# Patient Record
Sex: FEMALE | Race: WHITE | NOT HISPANIC OR LATINO | Employment: OTHER | URBAN - METROPOLITAN AREA
[De-identification: names, ages, dates, MRNs, and addresses within clinical notes are randomized per-mention and may not be internally consistent; named-entity substitution may affect disease eponyms.]

---

## 2023-02-21 LAB
ALBUMIN (G/DL) IN SER/PLAS: 4.7 G/DL (ref 3.4–5)
ANION GAP IN SER/PLAS: 13 MMOL/L (ref 10–20)
CALCIUM (MG/DL) IN SER/PLAS: 10.9 MG/DL (ref 8.6–10.6)
CARBON DIOXIDE, TOTAL (MMOL/L) IN SER/PLAS: 26 MMOL/L (ref 21–32)
CHLORIDE (MMOL/L) IN SER/PLAS: 107 MMOL/L (ref 98–107)
CREATININE (MG/DL) IN SER/PLAS: 0.98 MG/DL (ref 0.5–1.05)
ERYTHROCYTE DISTRIBUTION WIDTH (RATIO) BY AUTOMATED COUNT: 12.8 % (ref 11.5–14.5)
ERYTHROCYTE MEAN CORPUSCULAR HEMOGLOBIN CONCENTRATION (G/DL) BY AUTOMATED: 31.8 G/DL (ref 32–36)
ERYTHROCYTE MEAN CORPUSCULAR VOLUME (FL) BY AUTOMATED COUNT: 97 FL (ref 80–100)
ERYTHROCYTES (10*6/UL) IN BLOOD BY AUTOMATED COUNT: 4.07 X10E12/L (ref 4–5.2)
GFR FEMALE: 68 ML/MIN/1.73M2
GLUCOSE (MG/DL) IN SER/PLAS: 130 MG/DL (ref 74–99)
HEMATOCRIT (%) IN BLOOD BY AUTOMATED COUNT: 39.6 % (ref 36–46)
HEMOGLOBIN (G/DL) IN BLOOD: 12.6 G/DL (ref 12–16)
LEUKOCYTES (10*3/UL) IN BLOOD BY AUTOMATED COUNT: 6.2 X10E9/L (ref 4.4–11.3)
NRBC (PER 100 WBCS) BY AUTOMATED COUNT: 0 /100 WBC (ref 0–0)
PHOSPHATE (MG/DL) IN SER/PLAS: 3 MG/DL (ref 2.5–4.9)
PLATELETS (10*3/UL) IN BLOOD AUTOMATED COUNT: 344 X10E9/L (ref 150–450)
POTASSIUM (MMOL/L) IN SER/PLAS: 4.5 MMOL/L (ref 3.5–5.3)
SODIUM (MMOL/L) IN SER/PLAS: 141 MMOL/L (ref 136–145)
TACROLIMUS (NG/ML) IN BLOOD: 5.6 NG/ML (ref 2–15)
UREA NITROGEN (MG/DL) IN SER/PLAS: 8 MG/DL (ref 6–23)

## 2023-04-28 DIAGNOSIS — Z94.9 ORGAN TRANSPLANT: ICD-10-CM

## 2023-04-28 DIAGNOSIS — I10 ESSENTIAL HYPERTENSION, BENIGN: ICD-10-CM

## 2023-04-28 DIAGNOSIS — Z12.31 VISIT FOR SCREENING MAMMOGRAM: ICD-10-CM

## 2023-04-28 DIAGNOSIS — Z00.00 MEDICARE WELCOME VISIT: ICD-10-CM

## 2023-05-02 PROBLEM — R39.9 UTI SYMPTOMS: Status: ACTIVE | Noted: 2023-05-02

## 2023-05-02 PROBLEM — R30.0 DYSURIA: Status: ACTIVE | Noted: 2023-05-02

## 2023-05-02 PROBLEM — B34.8 BK VIREMIA: Status: ACTIVE | Noted: 2023-05-02

## 2023-05-02 PROBLEM — M25.511 SHOULDER PAIN, RIGHT: Status: ACTIVE | Noted: 2023-05-02

## 2023-05-02 PROBLEM — R31.9 HEMATURIA: Status: ACTIVE | Noted: 2023-05-02

## 2023-05-02 PROBLEM — D84.9 IMMUNOSUPPRESSION (MULTI): Status: ACTIVE | Noted: 2023-05-02

## 2023-05-02 PROBLEM — J32.9 SINOBRONCHITIS: Status: ACTIVE | Noted: 2023-05-02

## 2023-05-02 PROBLEM — S60.212A CONTUSION OF WRIST, LEFT: Status: ACTIVE | Noted: 2023-05-02

## 2023-05-02 PROBLEM — M75.101 ROTATOR CUFF TEAR, RIGHT: Status: ACTIVE | Noted: 2023-05-02

## 2023-05-02 PROBLEM — F41.8 DEPRESSION WITH ANXIETY: Status: ACTIVE | Noted: 2023-05-02

## 2023-05-02 PROBLEM — S20.212A CONTUSION OF CHEST WALL, LEFT, INITIAL ENCOUNTER: Status: ACTIVE | Noted: 2023-05-02

## 2023-05-02 PROBLEM — J32.9 SINUSITIS: Status: ACTIVE | Noted: 2023-05-02

## 2023-05-02 PROBLEM — A63.0 GENITAL WARTS: Status: ACTIVE | Noted: 2023-05-02

## 2023-05-02 PROBLEM — J20.9 ACUTE BRONCHITIS: Status: ACTIVE | Noted: 2023-05-02

## 2023-05-02 PROBLEM — E83.52 HYPERCALCEMIA: Status: ACTIVE | Noted: 2023-05-02

## 2023-05-02 PROBLEM — Z94.0 KIDNEY REPLACED BY TRANSPLANT (HHS-HCC): Status: ACTIVE | Noted: 2023-05-02

## 2023-05-02 PROBLEM — J40 SINOBRONCHITIS: Status: ACTIVE | Noted: 2023-05-02

## 2023-05-02 PROBLEM — R11.0 NAUSEA IN ADULT: Status: ACTIVE | Noted: 2023-05-02

## 2023-05-02 PROBLEM — Z94.9 TRANSPLANT: Status: ACTIVE | Noted: 2023-05-02

## 2023-05-02 PROBLEM — I10 ESSENTIAL HYPERTENSION: Status: ACTIVE | Noted: 2023-05-02

## 2023-05-02 PROBLEM — F33.8 OTHER RECURRENT DEPRESSIVE DISORDERS (CMS-HCC): Status: ACTIVE | Noted: 2023-05-02

## 2023-05-02 PROBLEM — K21.9 GERD (GASTROESOPHAGEAL REFLUX DISEASE): Status: ACTIVE | Noted: 2023-05-02

## 2023-05-02 PROBLEM — R10.30 LOWER ABDOMINAL PAIN: Status: ACTIVE | Noted: 2023-05-02

## 2023-05-02 PROBLEM — M75.80 ROTATOR CUFF TENDINITIS: Status: ACTIVE | Noted: 2023-05-02

## 2023-05-02 PROBLEM — R35.0 INCREASED FREQUENCY OF URINATION: Status: ACTIVE | Noted: 2023-05-02

## 2023-05-02 PROBLEM — N30.00 ACUTE CYSTITIS WITHOUT HEMATURIA: Status: ACTIVE | Noted: 2023-05-02

## 2023-05-02 PROBLEM — S46.011A TRAUMATIC TEAR OF RIGHT ROTATOR CUFF: Status: ACTIVE | Noted: 2023-05-02

## 2023-05-02 PROBLEM — N39.0 ACUTE LOWER UTI: Status: ACTIVE | Noted: 2023-05-02

## 2023-05-02 PROBLEM — R79.89 ELEVATED SERUM CREATININE: Status: ACTIVE | Noted: 2023-05-02

## 2023-05-02 PROBLEM — R73.9 HYPERGLYCEMIA: Status: ACTIVE | Noted: 2023-05-02

## 2023-05-02 PROBLEM — B02.9 HERPES ZOSTER INFECTION: Status: ACTIVE | Noted: 2023-05-02

## 2023-05-02 PROBLEM — S50.02XA CONTUSION OF ELBOW, LEFT: Status: ACTIVE | Noted: 2023-05-02

## 2023-05-02 PROBLEM — R87.619 ABNORMAL PAP SMEAR OF CERVIX: Status: ACTIVE | Noted: 2023-05-02

## 2023-05-02 PROBLEM — D64.9 ANEMIA: Status: ACTIVE | Noted: 2023-05-02

## 2023-05-02 PROBLEM — N18.2 CKD (CHRONIC KIDNEY DISEASE), STAGE II: Status: ACTIVE | Noted: 2023-05-02

## 2023-05-02 PROBLEM — B02.29 POST HERPETIC NEURALGIA: Status: ACTIVE | Noted: 2023-05-02

## 2023-05-02 PROBLEM — R51.9 HEADACHE: Status: ACTIVE | Noted: 2023-05-02

## 2023-05-02 PROBLEM — G47.00 INSOMNIA: Status: ACTIVE | Noted: 2023-05-02

## 2023-05-02 PROBLEM — M67.911 TENDINOPATHY OF RIGHT ROTATOR CUFF: Status: ACTIVE | Noted: 2023-05-02

## 2023-05-02 PROBLEM — S59.909A ELBOW INJURY: Status: ACTIVE | Noted: 2023-05-02

## 2023-05-02 PROBLEM — M89.8X1 SHOULDER BLADE PAIN: Status: ACTIVE | Noted: 2023-05-02

## 2023-05-02 PROBLEM — F41.1 GENERALIZED ANXIETY DISORDER: Status: ACTIVE | Noted: 2023-05-02

## 2023-05-02 PROBLEM — R21 RASH OF FACE: Status: ACTIVE | Noted: 2023-05-02

## 2023-05-05 ENCOUNTER — LAB (OUTPATIENT)
Dept: LAB | Facility: LAB | Age: 58
End: 2023-05-05
Payer: MEDICARE

## 2023-05-05 DIAGNOSIS — Z00.00 MEDICARE WELCOME VISIT: ICD-10-CM

## 2023-05-05 DIAGNOSIS — I10 ESSENTIAL HYPERTENSION, BENIGN: ICD-10-CM

## 2023-05-05 DIAGNOSIS — Z94.9 ORGAN TRANSPLANT: ICD-10-CM

## 2023-05-05 LAB
ALANINE AMINOTRANSFERASE (SGPT) (U/L) IN SER/PLAS: 8 U/L (ref 7–45)
ALBUMIN (G/DL) IN SER/PLAS: 4.4 G/DL (ref 3.4–5)
ALKALINE PHOSPHATASE (U/L) IN SER/PLAS: 87 U/L (ref 33–110)
ANION GAP IN SER/PLAS: 12 MMOL/L (ref 10–20)
ASPARTATE AMINOTRANSFERASE (SGOT) (U/L) IN SER/PLAS: 16 U/L (ref 9–39)
BASOPHILS (10*3/UL) IN BLOOD BY AUTOMATED COUNT: 0.08 X10E9/L (ref 0–0.1)
BASOPHILS/100 LEUKOCYTES IN BLOOD BY AUTOMATED COUNT: 0.9 % (ref 0–2)
BILIRUBIN TOTAL (MG/DL) IN SER/PLAS: 0.4 MG/DL (ref 0–1.2)
CALCIUM (MG/DL) IN SER/PLAS: 10.7 MG/DL (ref 8.6–10.6)
CARBON DIOXIDE, TOTAL (MMOL/L) IN SER/PLAS: 26 MMOL/L (ref 21–32)
CHLORIDE (MMOL/L) IN SER/PLAS: 105 MMOL/L (ref 98–107)
CHOLESTEROL (MG/DL) IN SER/PLAS: 196 MG/DL (ref 0–199)
CHOLESTEROL IN HDL (MG/DL) IN SER/PLAS: 104.7 MG/DL
CHOLESTEROL/HDL RATIO: 1.9
CREATININE (MG/DL) IN SER/PLAS: 1.1 MG/DL (ref 0.5–1.05)
EOSINOPHILS (10*3/UL) IN BLOOD BY AUTOMATED COUNT: 0.36 X10E9/L (ref 0–0.7)
EOSINOPHILS/100 LEUKOCYTES IN BLOOD BY AUTOMATED COUNT: 4 % (ref 0–6)
ERYTHROCYTE DISTRIBUTION WIDTH (RATIO) BY AUTOMATED COUNT: 12.9 % (ref 11.5–14.5)
ERYTHROCYTE MEAN CORPUSCULAR HEMOGLOBIN CONCENTRATION (G/DL) BY AUTOMATED: 31.6 G/DL (ref 32–36)
ERYTHROCYTE MEAN CORPUSCULAR VOLUME (FL) BY AUTOMATED COUNT: 96 FL (ref 80–100)
ERYTHROCYTES (10*6/UL) IN BLOOD BY AUTOMATED COUNT: 4.12 X10E12/L (ref 4–5.2)
GFR FEMALE: 59 ML/MIN/1.73M2
GLUCOSE (MG/DL) IN SER/PLAS: 109 MG/DL (ref 74–99)
HEMATOCRIT (%) IN BLOOD BY AUTOMATED COUNT: 39.6 % (ref 36–46)
HEMOGLOBIN (G/DL) IN BLOOD: 12.5 G/DL (ref 12–16)
IMMATURE GRANULOCYTES/100 LEUKOCYTES IN BLOOD BY AUTOMATED COUNT: 0.2 % (ref 0–0.9)
LDL: 79 MG/DL (ref 0–99)
LEUKOCYTES (10*3/UL) IN BLOOD BY AUTOMATED COUNT: 9 X10E9/L (ref 4.4–11.3)
LYMPHOCYTES (10*3/UL) IN BLOOD BY AUTOMATED COUNT: 2.78 X10E9/L (ref 1.2–4.8)
LYMPHOCYTES/100 LEUKOCYTES IN BLOOD BY AUTOMATED COUNT: 30.9 % (ref 13–44)
MONOCYTES (10*3/UL) IN BLOOD BY AUTOMATED COUNT: 0.51 X10E9/L (ref 0.1–1)
MONOCYTES/100 LEUKOCYTES IN BLOOD BY AUTOMATED COUNT: 5.7 % (ref 2–10)
NEUTROPHILS (10*3/UL) IN BLOOD BY AUTOMATED COUNT: 5.25 X10E9/L (ref 1.2–7.7)
NEUTROPHILS/100 LEUKOCYTES IN BLOOD BY AUTOMATED COUNT: 58.3 % (ref 40–80)
NRBC (PER 100 WBCS) BY AUTOMATED COUNT: 0 /100 WBC (ref 0–0)
PLATELETS (10*3/UL) IN BLOOD AUTOMATED COUNT: 352 X10E9/L (ref 150–450)
POTASSIUM (MMOL/L) IN SER/PLAS: 4.6 MMOL/L (ref 3.5–5.3)
PROTEIN TOTAL: 6.9 G/DL (ref 6.4–8.2)
SODIUM (MMOL/L) IN SER/PLAS: 138 MMOL/L (ref 136–145)
THYROTROPIN (MIU/L) IN SER/PLAS BY DETECTION LIMIT <= 0.05 MIU/L: 1.74 MIU/L (ref 0.44–3.98)
TRIGLYCERIDE (MG/DL) IN SER/PLAS: 62 MG/DL (ref 0–149)
UREA NITROGEN (MG/DL) IN SER/PLAS: 11 MG/DL (ref 6–23)
VLDL: 12 MG/DL (ref 0–40)

## 2023-05-05 PROCEDURE — 84443 ASSAY THYROID STIM HORMONE: CPT

## 2023-05-05 PROCEDURE — 36415 COLL VENOUS BLD VENIPUNCTURE: CPT

## 2023-05-05 PROCEDURE — 80061 LIPID PANEL: CPT

## 2023-05-05 PROCEDURE — 80053 COMPREHEN METABOLIC PANEL: CPT

## 2023-05-05 PROCEDURE — 85025 COMPLETE CBC W/AUTO DIFF WBC: CPT

## 2023-05-08 ENCOUNTER — APPOINTMENT (OUTPATIENT)
Dept: PRIMARY CARE | Facility: CLINIC | Age: 58
End: 2023-05-08
Payer: MEDICARE

## 2023-05-12 LAB
ALBUMIN (G/DL) IN SER/PLAS: 4.2 G/DL (ref 3.4–5)
ANION GAP IN SER/PLAS: 11 MMOL/L (ref 10–20)
CALCIDIOL (25 OH VITAMIN D3) (NG/ML) IN SER/PLAS: 38 NG/ML
CALCIUM (MG/DL) IN SER/PLAS: 10.4 MG/DL (ref 8.6–10.6)
CARBON DIOXIDE, TOTAL (MMOL/L) IN SER/PLAS: 28 MMOL/L (ref 21–32)
CHLORIDE (MMOL/L) IN SER/PLAS: 105 MMOL/L (ref 98–107)
CREATININE (MG/DL) IN SER/PLAS: 0.99 MG/DL (ref 0.5–1.05)
ERYTHROCYTE DISTRIBUTION WIDTH (RATIO) BY AUTOMATED COUNT: 12.4 % (ref 11.5–14.5)
ERYTHROCYTE MEAN CORPUSCULAR HEMOGLOBIN CONCENTRATION (G/DL) BY AUTOMATED: 32.2 G/DL (ref 32–36)
ERYTHROCYTE MEAN CORPUSCULAR VOLUME (FL) BY AUTOMATED COUNT: 96 FL (ref 80–100)
ERYTHROCYTES (10*6/UL) IN BLOOD BY AUTOMATED COUNT: 3.77 X10E12/L (ref 4–5.2)
GFR FEMALE: 67 ML/MIN/1.73M2
GLUCOSE (MG/DL) IN SER/PLAS: 135 MG/DL (ref 74–99)
HEMATOCRIT (%) IN BLOOD BY AUTOMATED COUNT: 36.3 % (ref 36–46)
HEMOGLOBIN (G/DL) IN BLOOD: 11.7 G/DL (ref 12–16)
LEUKOCYTES (10*3/UL) IN BLOOD BY AUTOMATED COUNT: 6.9 X10E9/L (ref 4.4–11.3)
NRBC (PER 100 WBCS) BY AUTOMATED COUNT: 0 /100 WBC (ref 0–0)
PARATHYRIN INTACT (PG/ML) IN SER/PLAS: 43.5 PG/ML (ref 18.5–88)
PHOSPHATE (MG/DL) IN SER/PLAS: 3.3 MG/DL (ref 2.5–4.9)
PLATELETS (10*3/UL) IN BLOOD AUTOMATED COUNT: 341 X10E9/L (ref 150–450)
POTASSIUM (MMOL/L) IN SER/PLAS: 4.5 MMOL/L (ref 3.5–5.3)
SODIUM (MMOL/L) IN SER/PLAS: 139 MMOL/L (ref 136–145)
TACROLIMUS (NG/ML) IN BLOOD: 7 NG/ML (ref 2–15)
UREA NITROGEN (MG/DL) IN SER/PLAS: 10 MG/DL (ref 6–23)

## 2023-05-26 ENCOUNTER — OFFICE VISIT (OUTPATIENT)
Dept: PRIMARY CARE | Facility: CLINIC | Age: 58
End: 2023-05-26
Payer: MEDICARE

## 2023-05-26 VITALS
DIASTOLIC BLOOD PRESSURE: 88 MMHG | WEIGHT: 125 LBS | TEMPERATURE: 98.4 F | BODY MASS INDEX: 20.09 KG/M2 | HEIGHT: 66 IN | SYSTOLIC BLOOD PRESSURE: 134 MMHG

## 2023-05-26 DIAGNOSIS — Z12.4 CERVICAL CANCER SCREENING: ICD-10-CM

## 2023-05-26 DIAGNOSIS — Z00.00 ROUTINE GENERAL MEDICAL EXAMINATION AT HEALTH CARE FACILITY: Primary | ICD-10-CM

## 2023-05-26 DIAGNOSIS — Z12.4 ENCOUNTER FOR PAPANICOLAOU SMEAR FOR CERVICAL CANCER SCREENING: ICD-10-CM

## 2023-05-26 DIAGNOSIS — I10 HYPERTENSION, UNSPECIFIED TYPE: ICD-10-CM

## 2023-05-26 PROCEDURE — 1036F TOBACCO NON-USER: CPT | Performed by: FAMILY MEDICINE

## 2023-05-26 PROCEDURE — 88141 CYTOPATH C/V INTERPRET: CPT | Performed by: PATHOLOGY

## 2023-05-26 PROCEDURE — G0439 PPPS, SUBSEQ VISIT: HCPCS | Performed by: FAMILY MEDICINE

## 2023-05-26 PROCEDURE — 3079F DIAST BP 80-89 MM HG: CPT | Performed by: FAMILY MEDICINE

## 2023-05-26 PROCEDURE — 88175 CYTOPATH C/V AUTO FLUID REDO: CPT

## 2023-05-26 PROCEDURE — 87624 HPV HI-RISK TYP POOLED RSLT: CPT

## 2023-05-26 PROCEDURE — 3075F SYST BP GE 130 - 139MM HG: CPT | Performed by: FAMILY MEDICINE

## 2023-05-26 RX ORDER — LURASIDONE HYDROCHLORIDE 20 MG/1
1 TABLET, FILM COATED ORAL DAILY
COMMUNITY
Start: 2022-03-15 | End: 2023-11-20 | Stop reason: SDUPTHER

## 2023-05-26 RX ORDER — TACROLIMUS 1 MG/1
2 CAPSULE ORAL 2 TIMES DAILY
COMMUNITY
Start: 2015-09-04 | End: 2023-12-01 | Stop reason: SDUPTHER

## 2023-05-26 RX ORDER — AMLODIPINE BESYLATE 5 MG/1
5 TABLET ORAL DAILY
COMMUNITY
End: 2023-05-26 | Stop reason: SDUPTHER

## 2023-05-26 RX ORDER — ERGOCALCIFEROL 1.25 MG/1
CAPSULE ORAL
COMMUNITY
End: 2024-05-10

## 2023-05-26 RX ORDER — AMLODIPINE BESYLATE 5 MG/1
5 TABLET ORAL DAILY
Qty: 90 TABLET | Refills: 3 | Status: SHIPPED | OUTPATIENT
Start: 2023-05-26 | End: 2023-11-20 | Stop reason: SDUPTHER

## 2023-05-26 RX ORDER — ONDANSETRON 4 MG/1
TABLET, ORALLY DISINTEGRATING ORAL
COMMUNITY
Start: 2022-05-24

## 2023-05-26 RX ORDER — ZINC GLUCONATE 50 MG
TABLET ORAL
COMMUNITY
End: 2023-12-01 | Stop reason: WASHOUT

## 2023-05-26 RX ORDER — TRAZODONE HYDROCHLORIDE 100 MG/1
2 TABLET ORAL NIGHTLY
COMMUNITY
Start: 2022-06-28 | End: 2023-10-25 | Stop reason: SDUPTHER

## 2023-05-26 RX ORDER — OMEPRAZOLE 20 MG/1
20 CAPSULE, DELAYED RELEASE ORAL 2 TIMES DAILY
COMMUNITY
End: 2024-05-15 | Stop reason: SDUPTHER

## 2023-05-26 RX ORDER — MYCOPHENOLATE MOFETIL 250 MG/1
CAPSULE ORAL EVERY 12 HOURS
COMMUNITY
Start: 2020-06-17 | End: 2023-12-01 | Stop reason: SDUPTHER

## 2023-05-26 RX ORDER — FLUOXETINE HYDROCHLORIDE 40 MG/1
1 CAPSULE ORAL DAILY
COMMUNITY
Start: 2022-02-14 | End: 2023-11-20 | Stop reason: SDUPTHER

## 2023-05-26 ASSESSMENT — ACTIVITIES OF DAILY LIVING (ADL)
GROCERY_SHOPPING: INDEPENDENT
DRESSING: INDEPENDENT
MANAGING_FINANCES: INDEPENDENT
TAKING_MEDICATION: INDEPENDENT
DOING_HOUSEWORK: INDEPENDENT
BATHING: INDEPENDENT

## 2023-05-26 ASSESSMENT — PATIENT HEALTH QUESTIONNAIRE - PHQ9
1. LITTLE INTEREST OR PLEASURE IN DOING THINGS: NOT AT ALL
SUM OF ALL RESPONSES TO PHQ9 QUESTIONS 1 AND 2: 0
2. FEELING DOWN, DEPRESSED OR HOPELESS: NOT AT ALL

## 2023-05-26 NOTE — PROGRESS NOTES
"Subjective   Reason for Visit: Viki Diez is an 58 y.o. female here for a Medicare Wellness visit.     Past Medical, Surgical, and Family History reviewed and updated in chart.         HPI  Has quit smoking- had teeth pulled and got dentures and has not smoked since    Saw gyn for abnormal pap smear- had procedure done     Had upper endoscopy with esophageal dilation     Colonoscopy 6/2022    Patient Care Team:  Mary Alva MD as PCP - General  Gertrude Galeana, APRN-CNP, APRFAUSTINO-CNS as PCP - MSSP ACO Attributed Provider     Review of Systems  General: no fever or night sweats, no change in weight  Eyes: no vision disturbance  ENT: no hearing loss, no hoarseness, no mouth lesions, no sore throat, and no dysphagia  CV: no chest pain, no palpitations, no lower extremity edema  Resp: no shortness of breath, no cough  GI: no abdominal pain, no change in bowel habits  : no urinary problems  MSK: no arthralgias, myalgias, or back pain  Skin; no rashes or new/changed skin lesions  Neuro: no headache, no difficulty walking      Objective   Vitals:  /88   Temp 36.9 °C (98.4 °F) (Oral)   Ht 1.676 m (5' 6\")   Wt 56.7 kg (125 lb)   BMI 20.18 kg/m²       Physical Exam  Appears well.     HEENT: OP clear. Sclera white. PERRL. EACs and TMs clear.     Neck: supple, no masses, or lymphadenopathy. No thyromegaly.     CVS: RRR. No murmurs. No peripheral edema.    Lungs: clear with normal inspirations and expirations.    Breasts: Symmetrical, no masses, no skin retraction or dimpling. No nipple discharge. No axillary nodes.    Abd: soft, NT, no masses or HSM.    Pelvic: No vulvar lesions. No vaginal lesions or discharge. Normal appearing cervix with no lesions. No adnexal masses. Normal uterus.    Rectal: No masses. Guaiac negative stool.    Skin: No suspicious lesions.     Assessment/Plan   Problem List Items Addressed This Visit    None  Visit Diagnoses       Routine general medical examination at [facility] " facility    -  Primary    Hypertension, unspecified type        Relevant Medications    amLODIPine (Norvasc) 5 mg tablet    Cervical cancer screening        Relevant Orders    THINPREP PAP TEST    Encounter for Papanicolaou smear for cervical cancer screening        Relevant Orders    THINPREP PAP TEST

## 2023-06-06 LAB
COMPLETE PATHOLOGY REPORT: NORMAL
CONVERTED ADDENDUM DIAGNOSIS 2: NORMAL
CONVERTED CLINICAL DIAGNOSIS-HISTORY: NORMAL
CONVERTED DIAGNOSIS COMMENT: NORMAL
CONVERTED FINAL DIAGNOSIS: NORMAL
CONVERTED FINAL REPORT PDF LINK TO COPY AND PASTE: NORMAL

## 2023-06-09 LAB
CREATININE (MG/DL) IN URINE: 86.1 MG/DL (ref 20–320)
PROTEIN (MG/DL) IN URINE: 12 MG/DL (ref 5–24)
PROTEIN/CREATININE (MG/MG) IN URINE: 0.14 MG/MG CREAT (ref 0–0.17)

## 2023-06-20 DIAGNOSIS — R87.619 ABNORMAL CERVICAL PAPANICOLAOU SMEAR, UNSPECIFIED ABNORMAL PAP FINDING: ICD-10-CM

## 2023-06-29 LAB — URINE CULTURE: ABNORMAL

## 2023-10-25 DIAGNOSIS — G47.00 INSOMNIA, UNSPECIFIED TYPE: ICD-10-CM

## 2023-10-25 RX ORDER — TRAZODONE HYDROCHLORIDE 100 MG/1
200 TABLET ORAL NIGHTLY
Qty: 60 TABLET | Refills: 0 | Status: SHIPPED | OUTPATIENT
Start: 2023-10-25 | End: 2023-11-20 | Stop reason: SDUPTHER

## 2023-11-20 DIAGNOSIS — F33.8 OTHER RECURRENT DEPRESSIVE DISORDERS (CMS-HCC): ICD-10-CM

## 2023-11-20 DIAGNOSIS — G47.00 INSOMNIA, UNSPECIFIED TYPE: ICD-10-CM

## 2023-11-20 DIAGNOSIS — I10 HYPERTENSION, UNSPECIFIED TYPE: ICD-10-CM

## 2023-11-20 RX ORDER — FLUOXETINE HYDROCHLORIDE 20 MG/1
20 CAPSULE ORAL DAILY
COMMUNITY
Start: 2023-08-14 | End: 2023-11-20 | Stop reason: SDUPTHER

## 2023-11-20 RX ORDER — TRAZODONE HYDROCHLORIDE 100 MG/1
200 TABLET ORAL NIGHTLY
Qty: 60 TABLET | Refills: 0 | Status: SHIPPED | OUTPATIENT
Start: 2023-11-20 | End: 2023-12-26

## 2023-11-20 RX ORDER — FLUOXETINE HYDROCHLORIDE 20 MG/1
20 CAPSULE ORAL DAILY
Qty: 30 CAPSULE | Refills: 0 | Status: SHIPPED | OUTPATIENT
Start: 2023-11-20 | End: 2024-05-15 | Stop reason: SDUPTHER

## 2023-11-20 RX ORDER — AMLODIPINE BESYLATE 5 MG/1
5 TABLET ORAL DAILY
Qty: 90 TABLET | Refills: 1 | Status: SHIPPED | OUTPATIENT
Start: 2023-11-20 | End: 2024-05-15 | Stop reason: SDUPTHER

## 2023-11-20 RX ORDER — LURASIDONE HYDROCHLORIDE 20 MG/1
20 TABLET, FILM COATED ORAL DAILY
Qty: 30 TABLET | Refills: 0 | Status: SHIPPED | OUTPATIENT
Start: 2023-11-20 | End: 2024-05-15 | Stop reason: SDUPTHER

## 2023-11-20 RX ORDER — FLUOXETINE HYDROCHLORIDE 40 MG/1
40 CAPSULE ORAL DAILY
Qty: 30 CAPSULE | Refills: 0 | Status: SHIPPED | OUTPATIENT
Start: 2023-11-20 | End: 2024-05-15 | Stop reason: SDUPTHER

## 2023-11-20 NOTE — TELEPHONE ENCOUNTER
Spoke w/patient, needs refill on   amLODIPine (Norvasc) 5 mg   Send to [facility]   Last appt. 5/26/23  Upcoming appt. none

## 2023-11-22 ENCOUNTER — TELEPHONE (OUTPATIENT)
Dept: TRANSPLANT | Facility: HOSPITAL | Age: 58
End: 2023-11-22
Payer: MEDICARE

## 2023-11-22 DIAGNOSIS — Z94.0 KIDNEY REPLACED BY TRANSPLANT (HHS-HCC): ICD-10-CM

## 2023-11-22 NOTE — TELEPHONE ENCOUNTER
Chart review, patient has not completed labs since 6/2023  LVM for patient to make sure all ok, to please get updated labs and to notify me if needs any refills or had labs drawn elsewhere.

## 2023-11-29 ENCOUNTER — LAB (OUTPATIENT)
Dept: LAB | Facility: LAB | Age: 58
End: 2023-11-29
Payer: MEDICARE

## 2023-11-29 DIAGNOSIS — Z94.0 KIDNEY REPLACED BY TRANSPLANT (HHS-HCC): ICD-10-CM

## 2023-11-29 LAB
ALBUMIN SERPL BCP-MCNC: 3.8 G/DL (ref 3.4–5)
ANION GAP SERPL CALC-SCNC: 11 MMOL/L (ref 10–20)
BUN SERPL-MCNC: 12 MG/DL (ref 6–23)
CALCIUM SERPL-MCNC: 10.1 MG/DL (ref 8.6–10.6)
CHLORIDE SERPL-SCNC: 97 MMOL/L (ref 98–107)
CO2 SERPL-SCNC: 30 MMOL/L (ref 21–32)
CREAT SERPL-MCNC: 0.96 MG/DL (ref 0.5–1.05)
GFR SERPL CREATININE-BSD FRML MDRD: 70 ML/MIN/1.73M*2
GLUCOSE SERPL-MCNC: 112 MG/DL (ref 74–99)
MAGNESIUM SERPL-MCNC: 1.25 MG/DL (ref 1.6–2.4)
PHOSPHATE SERPL-MCNC: 3.2 MG/DL (ref 2.5–4.9)
POTASSIUM SERPL-SCNC: 4.4 MMOL/L (ref 3.5–5.3)
SODIUM SERPL-SCNC: 134 MMOL/L (ref 136–145)
TACROLIMUS BLD-MCNC: 7.7 NG/ML

## 2023-11-29 PROCEDURE — 80197 ASSAY OF TACROLIMUS: CPT

## 2023-11-29 PROCEDURE — 80069 RENAL FUNCTION PANEL: CPT

## 2023-11-29 PROCEDURE — 36415 COLL VENOUS BLD VENIPUNCTURE: CPT

## 2023-11-29 PROCEDURE — 83735 ASSAY OF MAGNESIUM: CPT

## 2023-12-01 ENCOUNTER — TELEPHONE (OUTPATIENT)
Dept: TRANSPLANT | Facility: HOSPITAL | Age: 58
End: 2023-12-01
Payer: MEDICARE

## 2023-12-01 DIAGNOSIS — Z94.0 KIDNEY REPLACED BY TRANSPLANT (HHS-HCC): ICD-10-CM

## 2023-12-01 RX ORDER — TACROLIMUS 1 MG/1
2 CAPSULE ORAL 2 TIMES DAILY
Qty: 120 CAPSULE | Refills: 11 | Status: SHIPPED | OUTPATIENT
Start: 2023-12-01 | End: 2024-11-30

## 2023-12-01 RX ORDER — MYCOPHENOLATE MOFETIL 250 MG/1
1000 CAPSULE ORAL EVERY 12 HOURS
Qty: 240 CAPSULE | Refills: 11 | Status: SHIPPED | OUTPATIENT
Start: 2023-12-01 | End: 2024-11-30

## 2023-12-01 RX ORDER — CALCIUM CARBONATE 300MG(750)
400 TABLET,CHEWABLE ORAL 2 TIMES DAILY
Qty: 60 TABLET | Refills: 11 | Status: SHIPPED | OUTPATIENT
Start: 2023-12-01 | End: 2024-11-30

## 2023-12-01 NOTE — TELEPHONE ENCOUNTER
Lab review with Dr. Robison,   Mag 1.2 from 1.3  PLAN:  Start mag 400mg BID  Called and updated patient, script sent to local pharmacy  Also sent refills for tac and mmf

## 2023-12-21 DIAGNOSIS — K21.9 GASTRO-ESOPHAGEAL REFLUX DISEASE WITHOUT ESOPHAGITIS: ICD-10-CM

## 2023-12-22 DIAGNOSIS — G47.00 INSOMNIA, UNSPECIFIED TYPE: ICD-10-CM

## 2023-12-26 RX ORDER — TRAZODONE HYDROCHLORIDE 100 MG/1
200 TABLET ORAL NIGHTLY
Qty: 60 TABLET | Refills: 0 | Status: SHIPPED | OUTPATIENT
Start: 2023-12-26 | End: 2024-04-24 | Stop reason: SDUPTHER

## 2024-02-13 ENCOUNTER — TELEPHONE (OUTPATIENT)
Dept: BEHAVIORAL HEALTH | Facility: CLINIC | Age: 59
End: 2024-02-13
Payer: COMMERCIAL

## 2024-02-20 RX ORDER — OMEPRAZOLE 20 MG/1
20 CAPSULE, DELAYED RELEASE ORAL DAILY
Qty: 30 CAPSULE | Refills: 11 | OUTPATIENT
Start: 2024-02-20

## 2024-03-14 ENCOUNTER — TELEPHONE (OUTPATIENT)
Dept: TRANSPLANT | Facility: HOSPITAL | Age: 59
End: 2024-03-14
Payer: COMMERCIAL

## 2024-03-14 NOTE — TELEPHONE ENCOUNTER
Spoke with patient, states her insurance needs approval from doctor for Tac and MMF.   Call placed to [facility], told system down for Medicare Part B and cannot run correctly.     Called patient back, LVM for call back with insurance phone number to call and ask about PA needed or not.

## 2024-03-15 ENCOUNTER — TELEPHONE (OUTPATIENT)
Dept: TRANSPLANT | Facility: HOSPITAL | Age: 59
End: 2024-03-15
Payer: COMMERCIAL

## 2024-03-18 NOTE — TELEPHONE ENCOUNTER
Call placed to [facility] pharmacy to follow up if Medicare B up and running  Script ran for MMF and Tac, states PA needed.   Attempted to call for PA to start, transferred to 3 different people and eventually disconnected.   Sent PA through epic

## 2024-03-19 ENCOUNTER — TELEPHONE (OUTPATIENT)
Dept: TRANSPLANT | Facility: HOSPITAL | Age: 59
End: 2024-03-19
Payer: COMMERCIAL

## 2024-03-20 NOTE — TELEPHONE ENCOUNTER
Returned call to patient, completed PA questions through [employer]. Called [facility], has not come through yet on their end, will check back Thursday am.   Called to update patient.

## 2024-03-21 NOTE — TELEPHONE ENCOUNTER
Spoke with [facility] pharmacy, part B portal still down.   Email sent to [facility] asking about part B;  Yvan Lara, as far as I know we are still able to bill to Part B.  They might be down if they are still going through [facility] to bill their scripts a lot of pharmacies did not switch their systems over from the breach in Feb.  Send the scripts over and we will take a look.  Thanks, Manjula     Called patient to give update, she will  small script at  and pay cash.   Asked her to call me back if she would like me to send to [facility] so it can be run.

## 2024-04-24 DIAGNOSIS — Z12.31 VISIT FOR SCREENING MAMMOGRAM: ICD-10-CM

## 2024-04-24 DIAGNOSIS — Z94.0 KIDNEY REPLACED BY TRANSPLANT (HHS-HCC): ICD-10-CM

## 2024-04-24 DIAGNOSIS — Z00.00 ENCOUNTER FOR MEDICARE ANNUAL WELLNESS EXAM: ICD-10-CM

## 2024-04-24 DIAGNOSIS — I10 HYPERTENSION, UNSPECIFIED TYPE: ICD-10-CM

## 2024-04-24 DIAGNOSIS — G47.00 INSOMNIA, UNSPECIFIED TYPE: ICD-10-CM

## 2024-04-24 DIAGNOSIS — D64.9 ANEMIA, UNSPECIFIED TYPE: ICD-10-CM

## 2024-04-24 RX ORDER — TRAZODONE HYDROCHLORIDE 100 MG/1
200 TABLET ORAL NIGHTLY
Qty: 60 TABLET | Refills: 1 | Status: SHIPPED | OUTPATIENT
Start: 2024-04-24

## 2024-05-08 ENCOUNTER — LAB (OUTPATIENT)
Dept: LAB | Facility: LAB | Age: 59
End: 2024-05-08
Payer: COMMERCIAL

## 2024-05-08 ENCOUNTER — TELEPHONE (OUTPATIENT)
Dept: TRANSPLANT | Facility: HOSPITAL | Age: 59
End: 2024-05-08

## 2024-05-08 DIAGNOSIS — Z94.0 KIDNEY REPLACED BY TRANSPLANT (HHS-HCC): ICD-10-CM

## 2024-05-08 DIAGNOSIS — D64.9 ANEMIA, UNSPECIFIED TYPE: ICD-10-CM

## 2024-05-08 DIAGNOSIS — Z00.00 ENCOUNTER FOR MEDICARE ANNUAL WELLNESS EXAM: ICD-10-CM

## 2024-05-08 DIAGNOSIS — I10 HYPERTENSION, UNSPECIFIED TYPE: ICD-10-CM

## 2024-05-08 LAB
ALBUMIN SERPL BCP-MCNC: 4.2 G/DL (ref 3.4–5)
ALP SERPL-CCNC: 110 U/L (ref 33–110)
ALT SERPL W P-5'-P-CCNC: 8 U/L (ref 7–45)
ANION GAP SERPL CALC-SCNC: 14 MMOL/L (ref 10–20)
AST SERPL W P-5'-P-CCNC: 11 U/L (ref 9–39)
BASOPHILS # BLD AUTO: 0.08 X10*3/UL (ref 0–0.1)
BASOPHILS NFR BLD AUTO: 0.9 %
BILIRUB SERPL-MCNC: 0.4 MG/DL (ref 0–1.2)
BUN SERPL-MCNC: 9 MG/DL (ref 6–23)
CALCIUM SERPL-MCNC: 10.5 MG/DL (ref 8.6–10.6)
CHLORIDE SERPL-SCNC: 101 MMOL/L (ref 98–107)
CHOLEST SERPL-MCNC: 198 MG/DL (ref 0–199)
CHOLESTEROL/HDL RATIO: 2.7
CO2 SERPL-SCNC: 25 MMOL/L (ref 21–32)
CREAT SERPL-MCNC: 0.77 MG/DL (ref 0.5–1.05)
EGFRCR SERPLBLD CKD-EPI 2021: 90 ML/MIN/1.73M*2
EOSINOPHIL # BLD AUTO: 0.24 X10*3/UL (ref 0–0.7)
EOSINOPHIL NFR BLD AUTO: 2.8 %
ERYTHROCYTE [DISTWIDTH] IN BLOOD BY AUTOMATED COUNT: 13.4 % (ref 11.5–14.5)
GLUCOSE SERPL-MCNC: 119 MG/DL (ref 74–99)
HCT VFR BLD AUTO: 38.3 % (ref 36–46)
HDLC SERPL-MCNC: 72.6 MG/DL
HGB BLD-MCNC: 12.8 G/DL (ref 12–16)
IMM GRANULOCYTES # BLD AUTO: 0.02 X10*3/UL (ref 0–0.7)
IMM GRANULOCYTES NFR BLD AUTO: 0.2 % (ref 0–0.9)
LDLC SERPL CALC-MCNC: 94 MG/DL
LYMPHOCYTES # BLD AUTO: 2.66 X10*3/UL (ref 1.2–4.8)
LYMPHOCYTES NFR BLD AUTO: 30.9 %
MAGNESIUM SERPL-MCNC: 1.75 MG/DL (ref 1.6–2.4)
MCH RBC QN AUTO: 30.1 PG (ref 26–34)
MCHC RBC AUTO-ENTMCNC: 33.4 G/DL (ref 32–36)
MCV RBC AUTO: 90 FL (ref 80–100)
MONOCYTES # BLD AUTO: 0.82 X10*3/UL (ref 0.1–1)
MONOCYTES NFR BLD AUTO: 9.5 %
NEUTROPHILS # BLD AUTO: 4.79 X10*3/UL (ref 1.2–7.7)
NEUTROPHILS NFR BLD AUTO: 55.7 %
NON HDL CHOLESTEROL: 125 MG/DL (ref 0–149)
NRBC BLD-RTO: 0 /100 WBCS (ref 0–0)
PHOSPHATE SERPL-MCNC: 3.9 MG/DL (ref 2.5–4.9)
PLATELET # BLD AUTO: 421 X10*3/UL (ref 150–450)
POTASSIUM SERPL-SCNC: 4.2 MMOL/L (ref 3.5–5.3)
PROT SERPL-MCNC: 7.3 G/DL (ref 6.4–8.2)
RBC # BLD AUTO: 4.25 X10*6/UL (ref 4–5.2)
SODIUM SERPL-SCNC: 136 MMOL/L (ref 136–145)
TACROLIMUS BLD-MCNC: 6.5 NG/ML
TRIGL SERPL-MCNC: 157 MG/DL (ref 0–149)
TSH SERPL-ACNC: 3.19 MIU/L (ref 0.44–3.98)
VLDL: 31 MG/DL (ref 0–40)
WBC # BLD AUTO: 8.6 X10*3/UL (ref 4.4–11.3)

## 2024-05-08 PROCEDURE — 80053 COMPREHEN METABOLIC PANEL: CPT

## 2024-05-08 PROCEDURE — 36415 COLL VENOUS BLD VENIPUNCTURE: CPT

## 2024-05-08 PROCEDURE — 83735 ASSAY OF MAGNESIUM: CPT

## 2024-05-08 PROCEDURE — 85025 COMPLETE CBC W/AUTO DIFF WBC: CPT

## 2024-05-08 PROCEDURE — 80197 ASSAY OF TACROLIMUS: CPT

## 2024-05-08 PROCEDURE — 80061 LIPID PANEL: CPT

## 2024-05-08 PROCEDURE — 84443 ASSAY THYROID STIM HORMONE: CPT

## 2024-05-08 PROCEDURE — 84100 ASSAY OF PHOSPHORUS: CPT

## 2024-05-09 NOTE — TELEPHONE ENCOUNTER
Returned to call to patient, she wanted to review labs, all within normal range, nothing concerning. Lipid panel ordered by PCP who patient is seeing tomorrow 5/10/24.  Patient had NFQ.

## 2024-05-10 ENCOUNTER — OFFICE VISIT (OUTPATIENT)
Dept: PRIMARY CARE | Facility: CLINIC | Age: 59
End: 2024-05-10
Payer: MEDICARE

## 2024-05-10 VITALS
OXYGEN SATURATION: 97 % | BODY MASS INDEX: 19.44 KG/M2 | HEIGHT: 66 IN | HEART RATE: 94 BPM | TEMPERATURE: 98 F | SYSTOLIC BLOOD PRESSURE: 122 MMHG | WEIGHT: 121 LBS | DIASTOLIC BLOOD PRESSURE: 82 MMHG

## 2024-05-10 DIAGNOSIS — K21.9 GASTROESOPHAGEAL REFLUX DISEASE WITHOUT ESOPHAGITIS: ICD-10-CM

## 2024-05-10 DIAGNOSIS — I10 HYPERTENSION, UNSPECIFIED TYPE: ICD-10-CM

## 2024-05-10 DIAGNOSIS — Z00.00 HEALTH MAINTENANCE EXAMINATION: Primary | ICD-10-CM

## 2024-05-10 DIAGNOSIS — G47.00 INSOMNIA, UNSPECIFIED TYPE: ICD-10-CM

## 2024-05-10 DIAGNOSIS — F33.8 OTHER RECURRENT DEPRESSIVE DISORDERS (CMS-HCC): ICD-10-CM

## 2024-05-10 PROCEDURE — G0439 PPPS, SUBSEQ VISIT: HCPCS | Performed by: FAMILY MEDICINE

## 2024-05-10 PROCEDURE — 3074F SYST BP LT 130 MM HG: CPT | Performed by: FAMILY MEDICINE

## 2024-05-10 PROCEDURE — 3079F DIAST BP 80-89 MM HG: CPT | Performed by: FAMILY MEDICINE

## 2024-05-10 PROCEDURE — 1036F TOBACCO NON-USER: CPT | Performed by: FAMILY MEDICINE

## 2024-05-10 RX ORDER — OMEPRAZOLE 20 MG/1
20 CAPSULE, DELAYED RELEASE ORAL
Qty: 90 CAPSULE | Refills: 0 | Status: CANCELLED | OUTPATIENT
Start: 2024-05-10

## 2024-05-10 RX ORDER — FLUOXETINE HYDROCHLORIDE 40 MG/1
40 CAPSULE ORAL DAILY
Qty: 30 CAPSULE | Refills: 0 | Status: CANCELLED | OUTPATIENT
Start: 2024-05-10 | End: 2024-06-09

## 2024-05-10 RX ORDER — LURASIDONE HYDROCHLORIDE 20 MG/1
20 TABLET, FILM COATED ORAL DAILY
Qty: 30 TABLET | Refills: 0 | Status: CANCELLED | OUTPATIENT
Start: 2024-05-10 | End: 2024-06-09

## 2024-05-10 RX ORDER — FLUOXETINE HYDROCHLORIDE 20 MG/1
20 CAPSULE ORAL DAILY
Qty: 30 CAPSULE | Refills: 0 | Status: CANCELLED | OUTPATIENT
Start: 2024-05-10 | End: 2024-06-09

## 2024-05-10 RX ORDER — AMLODIPINE BESYLATE 5 MG/1
5 TABLET ORAL DAILY
Qty: 90 TABLET | Refills: 1 | Status: CANCELLED | OUTPATIENT
Start: 2024-05-10

## 2024-05-10 RX ORDER — TRAZODONE HYDROCHLORIDE 100 MG/1
200 TABLET ORAL NIGHTLY
Qty: 60 TABLET | Refills: 1 | Status: CANCELLED | OUTPATIENT
Start: 2024-05-10

## 2024-05-10 ASSESSMENT — PATIENT HEALTH QUESTIONNAIRE - PHQ9
1. LITTLE INTEREST OR PLEASURE IN DOING THINGS: NOT AT ALL
2. FEELING DOWN, DEPRESSED OR HOPELESS: NOT AT ALL
SUM OF ALL RESPONSES TO PHQ9 QUESTIONS 1 AND 2: 0

## 2024-05-10 ASSESSMENT — ACTIVITIES OF DAILY LIVING (ADL)
DOING_HOUSEWORK: INDEPENDENT
BATHING: INDEPENDENT
GROCERY_SHOPPING: INDEPENDENT
DRESSING: INDEPENDENT
MANAGING_FINANCES: INDEPENDENT
TAKING_MEDICATION: INDEPENDENT

## 2024-05-10 NOTE — PROGRESS NOTES
"Subjective   Patient ID: Viki Diez is a 59 y.o. female who presents for Medicare Annual Wellness Visit Subsequent (EP. Here for [facility]. ).  HPI  Feels well, no specific complaints or concerns today.     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     Goes to Dr. Cisse for gyn care   Sees psych (Gertrude Galeana)  for depression/anxiety and insomnia    Colonoscopy 2022  Review of Systems  General: no fever or night sweats, no change in weight  Eyes: no vision disturbance  ENT: no hearing loss, no hoarseness, no mouth lesions, no sore throat, and no dysphagia  CV: no chest pain, no palpitations, no lower extremity edema  Resp: no shortness of breath, no cough  GI: no abdominal pain, no change in bowel habits  : no urinary problems  MSK: no arthralgias, myalgias, or back pain  Skin; no rashes or new/changed skin lesions  Neuro: no headache, no difficulty walking     Objective   /82   Pulse 94   Temp 36.7 °C (98 °F) (Oral)   Ht 1.676 m (5' 6\")   Wt 54.9 kg (121 lb)   SpO2 97%   BMI 19.53 kg/m²    Physical Exam  Appears well.     HEENT: OP clear. Sclera white. PERRL. EACs and TMs clear.     Neck: supple, no masses, or lymphadenopathy. No thyromegaly.     CVS: RRR. No murmurs. No peripheral edema.    Lungs: clear with normal inspirations and expirations.    Breasts: Symmetrical, no masses, no skin retraction or dimpling. No nipple discharge. No axillary nodes.    Abd: soft, NT, no masses or HSM.    Skin: No suspicious lesions.    Assessment/Plan   Diagnoses and all orders for this visit:  Health maintenance examination  Other recurrent depressive disorders (CMS-HCC)  Insomnia, unspecified type  Hypertension, unspecified type  Gastroesophageal reflux disease without esophagitis        Mary Alva MD  Family " [facility]

## 2024-05-15 ENCOUNTER — TELEPHONE (OUTPATIENT)
Dept: TRANSPLANT | Facility: HOSPITAL | Age: 59
End: 2024-05-15
Payer: COMMERCIAL

## 2024-05-15 DIAGNOSIS — F33.8 OTHER RECURRENT DEPRESSIVE DISORDERS (CMS-HCC): ICD-10-CM

## 2024-05-15 DIAGNOSIS — K21.9 GASTROESOPHAGEAL REFLUX DISEASE, UNSPECIFIED WHETHER ESOPHAGITIS PRESENT: ICD-10-CM

## 2024-05-15 DIAGNOSIS — I10 HYPERTENSION, UNSPECIFIED TYPE: ICD-10-CM

## 2024-05-15 RX ORDER — OMEPRAZOLE 20 MG/1
20 CAPSULE, DELAYED RELEASE ORAL
Qty: 30 CAPSULE | Refills: 11 | Status: SHIPPED | OUTPATIENT
Start: 2024-05-15 | End: 2025-05-15

## 2024-05-15 RX ORDER — FLUOXETINE HYDROCHLORIDE 20 MG/1
20 CAPSULE ORAL DAILY
Qty: 30 CAPSULE | Refills: 0 | Status: SHIPPED | OUTPATIENT
Start: 2024-05-15 | End: 2024-06-14

## 2024-05-15 RX ORDER — FLUOXETINE HYDROCHLORIDE 40 MG/1
40 CAPSULE ORAL DAILY
Qty: 30 CAPSULE | Refills: 0 | Status: SHIPPED | OUTPATIENT
Start: 2024-05-15 | End: 2024-06-14

## 2024-05-15 RX ORDER — LURASIDONE HYDROCHLORIDE 20 MG/1
20 TABLET, FILM COATED ORAL DAILY
Qty: 30 TABLET | Refills: 0 | Status: SHIPPED | OUTPATIENT
Start: 2024-05-15 | End: 2024-06-14

## 2024-05-15 RX ORDER — AMLODIPINE BESYLATE 5 MG/1
5 TABLET ORAL DAILY
Qty: 30 TABLET | Refills: 11 | Status: SHIPPED | OUTPATIENT
Start: 2024-05-15 | End: 2025-05-15

## 2024-05-20 ENCOUNTER — TELEPHONE (OUTPATIENT)
Dept: TRANSPLANT | Facility: HOSPITAL | Age: 59
End: 2024-05-20
Payer: COMMERCIAL

## 2024-05-20 NOTE — TELEPHONE ENCOUNTER
Returned call to shantel reddy.  OK to take amlodipine and tac.  Pt has been taking.  No further questions.

## 2024-05-20 NOTE — TELEPHONE ENCOUNTER
[facility],  looking to speak with someone about an drug to drug interaction on a prescription that was sent over today  [phone]

## 2024-06-18 DIAGNOSIS — F33.8 OTHER RECURRENT DEPRESSIVE DISORDERS (CMS-HCC): ICD-10-CM

## 2024-06-18 RX ORDER — FLUOXETINE HYDROCHLORIDE 40 MG/1
40 CAPSULE ORAL DAILY
Qty: 30 CAPSULE | Refills: 0 | OUTPATIENT
Start: 2024-06-18

## 2024-06-18 RX ORDER — FLUOXETINE HYDROCHLORIDE 20 MG/1
CAPSULE ORAL
Qty: 30 CAPSULE | Refills: 0 | OUTPATIENT
Start: 2024-06-18

## 2024-06-24 DIAGNOSIS — F33.8 OTHER RECURRENT DEPRESSIVE DISORDERS (CMS-HCC): ICD-10-CM

## 2024-06-24 DIAGNOSIS — G47.00 INSOMNIA, UNSPECIFIED TYPE: ICD-10-CM

## 2024-06-24 RX ORDER — TRAZODONE HYDROCHLORIDE 100 MG/1
200 TABLET ORAL NIGHTLY
Qty: 60 TABLET | Refills: 1 | Status: SHIPPED | OUTPATIENT
Start: 2024-06-24 | End: 2024-08-23

## 2024-06-24 RX ORDER — LURASIDONE HYDROCHLORIDE 20 MG/1
20 TABLET, FILM COATED ORAL DAILY
Qty: 30 TABLET | Refills: 1 | Status: SHIPPED | OUTPATIENT
Start: 2024-06-24

## 2024-06-24 RX ORDER — FLUOXETINE HYDROCHLORIDE 20 MG/1
20 CAPSULE ORAL DAILY
Qty: 30 CAPSULE | Refills: 1 | Status: SHIPPED | OUTPATIENT
Start: 2024-06-24 | End: 2024-08-23

## 2024-06-24 RX ORDER — FLUOXETINE HYDROCHLORIDE 40 MG/1
40 CAPSULE ORAL DAILY
Qty: 30 CAPSULE | Refills: 1 | Status: SHIPPED | OUTPATIENT
Start: 2024-06-24 | End: 2024-08-23

## 2024-08-21 PROBLEM — F33.42 RECURRENT MAJOR DEPRESSIVE DISORDER, IN FULL REMISSION (CMS-HCC): Status: ACTIVE | Noted: 2024-08-21

## 2024-08-21 PROBLEM — R10.9 ABDOMINAL PAIN: Status: ACTIVE | Noted: 2024-08-21

## 2024-08-21 PROBLEM — N39.0 RECURRENT UTI: Status: ACTIVE | Noted: 2024-08-21

## 2024-08-21 PROBLEM — R11.0 NAUSEA: Status: ACTIVE | Noted: 2023-05-02

## 2024-08-21 PROBLEM — R21 RASH: Status: ACTIVE | Noted: 2023-05-02

## 2024-08-21 PROBLEM — Z86.39 HISTORY OF HYPERCHOLESTEROLEMIA: Status: ACTIVE | Noted: 2024-08-21

## 2024-08-21 PROBLEM — Z94.9 ORGAN OR TISSUE REPLACED BY TRANSPLANT: Status: ACTIVE | Noted: 2023-05-02

## 2024-08-21 PROBLEM — R39.89 SUSPECTED UTI: Status: ACTIVE | Noted: 2024-08-21

## 2024-08-21 PROBLEM — Z79.52 LONG TERM (CURRENT) USE OF SYSTEMIC STEROIDS: Status: ACTIVE | Noted: 2024-08-21

## 2024-08-21 PROBLEM — R19.7 DIARRHEA: Status: ACTIVE | Noted: 2024-08-21

## 2024-08-21 PROBLEM — N19 RENAL FAILURE: Status: ACTIVE | Noted: 2024-08-21

## 2024-08-21 PROBLEM — E83.42 HYPOMAGNESEMIA: Status: ACTIVE | Noted: 2024-08-21

## 2024-08-21 PROBLEM — J96.90 RESPIRATORY FAILURE (MULTI): Status: ACTIVE | Noted: 2024-08-21

## 2024-08-21 PROBLEM — B02.29 POSTHERPETIC NEURALGIA: Status: ACTIVE | Noted: 2023-05-02

## 2024-08-21 PROBLEM — Z94.0 HISTORY OF KIDNEY TRANSPLANT (HHS-HCC): Status: ACTIVE | Noted: 2023-05-02

## 2024-08-21 PROBLEM — S46.019A TRAUMATIC ROTATOR CUFF TEAR: Status: ACTIVE | Noted: 2023-05-02

## 2024-08-21 PROBLEM — F33.9 RECURRENT DEPRESSIVE DISORDER (CMS-HCC): Status: ACTIVE | Noted: 2023-05-02

## 2024-08-21 PROBLEM — F10.929 ALCOHOLIC INTOXICATION (CMS-HCC): Status: ACTIVE | Noted: 2024-08-21

## 2024-08-21 PROBLEM — E87.6 HYPOKALEMIA: Status: ACTIVE | Noted: 2024-08-21

## 2024-08-21 PROBLEM — M25.511 PAIN OF RIGHT SHOULDER REGION: Status: ACTIVE | Noted: 2023-05-02

## 2024-08-21 PROBLEM — K52.9 GASTROENTERITIS, ACUTE: Status: ACTIVE | Noted: 2024-08-21

## 2024-08-21 PROBLEM — M75.100 TEAR OF ROTATOR CUFF: Status: ACTIVE | Noted: 2024-08-21

## 2024-08-21 PROBLEM — K21.9 GASTROESOPHAGEAL REFLUX DISEASE: Status: ACTIVE | Noted: 2023-05-02

## 2024-08-21 PROBLEM — R87.619 ABNORMAL CERVICAL PAPANICOLAOU SMEAR: Status: ACTIVE | Noted: 2023-05-02

## 2024-08-21 PROBLEM — M89.8X1 SCAPULALGIA: Status: ACTIVE | Noted: 2023-05-02

## 2024-08-21 PROBLEM — N39.0 ACUTE URINARY TRACT INFECTION: Status: ACTIVE | Noted: 2024-08-21

## 2024-08-21 PROBLEM — Z94.9 TRANSPLANTED ORGAN AND TISSUE STATUS, UNSPECIFIED: Status: ACTIVE | Noted: 2023-05-05

## 2024-08-21 PROBLEM — F41.8 MIXED ANXIETY DEPRESSIVE DISORDER: Status: ACTIVE | Noted: 2023-05-02

## 2024-08-21 PROBLEM — R39.9 SYMPTOMS INVOLVING URINARY SYSTEM: Status: ACTIVE | Noted: 2023-05-02

## 2024-08-21 PROBLEM — J32.9 CHRONIC SINUSITIS: Status: ACTIVE | Noted: 2023-05-02

## 2024-08-21 PROBLEM — M75.101 TEAR OF RIGHT ROTATOR CUFF: Status: ACTIVE | Noted: 2023-05-02

## 2024-08-21 PROBLEM — M79.643 PAIN OF HAND AFTER TRAUMA: Status: ACTIVE | Noted: 2024-08-21

## 2024-08-21 PROBLEM — F43.22 ADJUSTMENT DISORDER WITH ANXIOUS MOOD: Status: ACTIVE | Noted: 2024-08-21

## 2024-08-21 PROBLEM — I10 PRIMARY HYPERTENSION: Status: ACTIVE | Noted: 2023-05-02

## 2024-08-21 PROBLEM — K52.9 ACUTE GASTROENTERITIS: Status: ACTIVE | Noted: 2024-08-21

## 2024-08-21 PROBLEM — M67.919 DISORDER OF ROTATOR CUFF: Status: ACTIVE | Noted: 2023-05-02

## 2024-08-21 PROBLEM — S50.00XA CONTUSION OF ELBOW: Status: ACTIVE | Noted: 2023-05-02

## 2024-08-21 PROBLEM — Z86.59 HISTORY OF DEPRESSION: Status: ACTIVE | Noted: 2024-08-21

## 2024-08-21 PROBLEM — N20.0 CALCULUS OF KIDNEY: Status: ACTIVE | Noted: 2024-08-21

## 2024-08-22 ENCOUNTER — APPOINTMENT (OUTPATIENT)
Dept: BEHAVIORAL HEALTH | Facility: CLINIC | Age: 59
End: 2024-08-22
Payer: MEDICARE

## 2024-08-22 DIAGNOSIS — G47.09 OTHER INSOMNIA: ICD-10-CM

## 2024-08-22 DIAGNOSIS — G47.00 INSOMNIA, UNSPECIFIED TYPE: ICD-10-CM

## 2024-08-22 DIAGNOSIS — F41.1 GENERALIZED ANXIETY DISORDER: ICD-10-CM

## 2024-08-22 DIAGNOSIS — F33.42 RECURRENT MAJOR DEPRESSIVE DISORDER, IN FULL REMISSION (CMS-HCC): ICD-10-CM

## 2024-08-22 DIAGNOSIS — F33.8 OTHER RECURRENT DEPRESSIVE DISORDERS (CMS-HCC): ICD-10-CM

## 2024-08-22 PROCEDURE — 99213 OFFICE O/P EST LOW 20 MIN: CPT | Performed by: CLINICAL NURSE SPECIALIST

## 2024-08-22 PROCEDURE — 1036F TOBACCO NON-USER: CPT | Performed by: CLINICAL NURSE SPECIALIST

## 2024-08-22 RX ORDER — LURASIDONE HYDROCHLORIDE 20 MG/1
20 TABLET, FILM COATED ORAL DAILY
Qty: 30 TABLET | Refills: 2 | Status: SHIPPED | OUTPATIENT
Start: 2024-08-22 | End: 2024-11-20

## 2024-08-22 RX ORDER — FLUOXETINE HYDROCHLORIDE 40 MG/1
40 CAPSULE ORAL DAILY
Qty: 30 CAPSULE | Refills: 2 | Status: SHIPPED | OUTPATIENT
Start: 2024-08-22 | End: 2024-11-20

## 2024-08-22 RX ORDER — TRAZODONE HYDROCHLORIDE 100 MG/1
200 TABLET ORAL NIGHTLY
Qty: 60 TABLET | Refills: 2 | Status: SHIPPED | OUTPATIENT
Start: 2024-08-22 | End: 2024-11-20

## 2024-08-22 RX ORDER — TRAZODONE HYDROCHLORIDE 100 MG/1
TABLET ORAL
Qty: 60 TABLET | Refills: 0 | OUTPATIENT
Start: 2024-08-22

## 2024-08-22 RX ORDER — FLUOXETINE HYDROCHLORIDE 20 MG/1
20 CAPSULE ORAL DAILY
Qty: 30 CAPSULE | Refills: 2 | Status: SHIPPED | OUTPATIENT
Start: 2024-08-22 | End: 2024-11-20

## 2024-08-22 NOTE — ASSESSMENT & PLAN NOTE
Tolerating and benefiting from current regimen. No indication for medication changes today.      Continue trazodone 200 mg PO at bedtime- refill sent to pharmacy today.

## 2024-08-22 NOTE — PROGRESS NOTES
Outpatient Psychiatry      Subjective   Viki Diez, is a 59 y.o. female,  seen in follow-up.        Assessment/Plan   Problem List Items Addressed This Visit             ICD-10-CM    Generalized anxiety disorder F41.1     Tolerating and benefiting from current regimen. No indication for medication changes today.      Continue fluoxetine 60 mg PO daily- refill sent to pharmacy today.   Continue trazodone 200 mg PO at bedtime- refill sent to pharmacy today.          Relevant Orders    Follow Up In Psychiatry    Insomnia G47.00     Tolerating and benefiting from current regimen. No indication for medication changes today.      Continue trazodone 200 mg PO at bedtime- refill sent to pharmacy today.          Relevant Medications    traZODone (Desyrel) 100 mg tablet    Other Relevant Orders    Follow Up In Psychiatry    Follow Up In Psychiatry    Recurrent major depressive disorder, in full remission (CMS-HCC) F33.42     >>ASSESSMENT AND PLAN FOR OTHER RECURRENT DEPRESSIVE DISORDERS (CMS-[facility]) WRITTEN ON 8/22/2024  2:25 PM BY JESSICA AMBROCIO, APRN-CNP, APRN-CNS    Tolerating and benefiting from current regimen. No indication for medication changes today.      Continue fluoxetine and trazodone as noted above.   Continue lurasidone 20 mg PO QPM with food- refill sent to pharmacy today.          Relevant Orders    Follow Up In Psychiatry     Other Visit Diagnoses         Codes    Other recurrent depressive disorders (CMS-HCC)     F33.8    Relevant Medications    FLUoxetine (PROzac) 20 mg capsule    FLUoxetine (PROzac) 40 mg capsule    lurasidone (Latuda) 20 mg tablet    Other Relevant Orders    Follow Up In Psychiatry            Follow-up in 3 months.     Risk Assessment:  Risk Assessment: Viki Diez is currently a low acute risk of suicide and self-harm due to no past suicide attempt(s) and not currently endorsing thoughts of suicide. Viki Diez is currently a low acute risk of violence and harm to others despite  "past history of violence  and not  currently threatening others.  Suicidal Risk Factors: , chronic medical illness, and current psychiatric illness  Violence Risk Factors: past history of violence  Protective Factors: strong coping skills, social support/connectedness, moral objections to suicide, positive family relationships, hopefulness/future orientation, and marriage/partnership      Reason for Visit:  Follow-up for medication management.     HPI:  Last seen in 8/2023.     She was unable to access camera for today's visit, so appointment was held via phone.     Since her last visit,   Doing really well.   \"Things have been going great for me, that's why you haven't heard from me in a while.\"    Mood has been good. Stable.   Medications she's currently taking have been beneficial, no significant mood episodes.   \"I'm always a worrier, but I feel like I can handle it pretty well now.\"   Sleeping very well as long as she takes trazodone.   Energy level is good.   No significant changes in appetite or weight.   Denies any other depressive sx.     All of the legal issues she was going through last year are done.   Was on probation for a year- in 2 months can apply to have charges expunged.   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 touching money, glasses, customers not being very hygenic).  Still living with s/o, looking for a house because apartment they're living in is too small for them and the dogs.     No new concerns today.     Denies suicidal ideation or a passive death wish.     No new medications or health problems.       Current Psychiatric Medications:  Fluoxetine 60 mg PO daily  Lurasidone 20 mg PO QPM with food  Trazodone 200 mg PO at bedtime       Record Review: brief     Medical Review Of Systems:  Pertinent items are noted in HPI.    Psychiatric Review Of Systems:  As noted in HPI.        Objective   Mental Status " "Exam  General Appearance: Other: (Comment) (unable to assess- audio only.)  Attitude/Behavior: Cooperative  Motor: Other: (comment) (unable to assess- audio only.)  Speech: Normal rate, volume, prosody  Gait/Station: Other:(comment) (unable to assess- audio only.)  Mood: \"Really good.\"  Affect: Euthymic, full-range, Congruent with mood and topic of conversation  Thought Process: Linear, goal directed  Thought Associations: No loosening of associations  Thought Content: Normal  Perception: No perceptual abnormalities noted  Sensorium: Alert and oriented to person, place, time and situation  Insight: Intact  Judgement: Intact  Cognition: Cognitively intact to conversational testing with respect to attention, orientation, fund of knowledge, recent and remote memory, and language    Vitals:  There were no vitals filed for this visit.    Labs:  Lab on 05/08/2024   Component Date Value    Magnesium 05/08/2024 1.75     Tacrolimus  05/08/2024 6.5     WBC 05/08/2024 8.6     nRBC 05/08/2024 0.0     RBC 05/08/2024 4.25     Hemoglobin 05/08/2024 12.8     Hematocrit 05/08/2024 38.3     MCV 05/08/2024 90     MCH 05/08/2024 30.1     MCHC 05/08/2024 33.4     RDW 05/08/2024 13.4     Platelets 05/08/2024 421     Neutrophils % 05/08/2024 55.7     Immature Granulocytes %,* 05/08/2024 0.2     Lymphocytes % 05/08/2024 30.9     Monocytes % 05/08/2024 9.5     Eosinophils % 05/08/2024 2.8     Basophils % 05/08/2024 0.9     Neutrophils Absolute 05/08/2024 4.79     Immature Granulocytes Ab* 05/08/2024 0.02     Lymphocytes Absolute 05/08/2024 2.66     Monocytes Absolute 05/08/2024 0.82     Eosinophils Absolute 05/08/2024 0.24     Basophils Absolute 05/08/2024 0.08     Glucose 05/08/2024 119 (H)     Sodium 05/08/2024 136     Potassium 05/08/2024 4.2     Chloride 05/08/2024 101     Bicarbonate 05/08/2024 25     Anion Gap 05/08/2024 14     Urea Nitrogen 05/08/2024 9     Creatinine 05/08/2024 0.77     eGFR 05/08/2024 90     Calcium 05/08/2024 10.5 "     Albumin 05/08/2024 4.2     Alkaline Phosphatase 05/08/2024 110     Total Protein 05/08/2024 7.3     AST 05/08/2024 11     Bilirubin, Total 05/08/2024 0.4     ALT 05/08/2024 8     Cholesterol 05/08/2024 198     HDL-Cholesterol 05/08/2024 72.6     Cholesterol/HDL Ratio 05/08/2024 2.7     LDL Calculated 05/08/2024 94     VLDL 05/08/2024 31     Triglycerides 05/08/2024 157 (H)     Non HDL Cholesterol 05/08/2024 125     Thyroid Stimulating Horm* 05/08/2024 3.19     Phosphorus 05/08/2024 3.9        Gertrude Galeana, APRN-CNP, APRN-CNS

## 2024-08-22 NOTE — ASSESSMENT & PLAN NOTE
Tolerating and benefiting from current regimen. No indication for medication changes today.      Continue fluoxetine 60 mg PO daily- refill sent to pharmacy today.   Continue trazodone 200 mg PO at bedtime- refill sent to pharmacy today.

## 2024-08-22 NOTE — ASSESSMENT & PLAN NOTE
>>ASSESSMENT AND PLAN FOR OTHER RECURRENT DEPRESSIVE DISORDERS (CMS-HCC) WRITTEN ON 8/22/2024  2:25 PM BY JESSICA AMBROCIO, APRN-CNP, APRN-CNS    Tolerating and benefiting from current regimen. No indication for medication changes today.      Continue fluoxetine and trazodone as noted above.   Continue lurasidone 20 mg PO QPM with food- refill sent to pharmacy today.

## 2024-08-22 NOTE — ASSESSMENT & PLAN NOTE
Tolerating and benefiting from current regimen. No indication for medication changes today.      Continue fluoxetine and trazodone as noted above.   Continue lurasidone 20 mg PO QPM with food- refill sent to pharmacy today.

## 2024-11-18 DIAGNOSIS — F33.8 OTHER RECURRENT DEPRESSIVE DISORDERS: ICD-10-CM

## 2024-11-18 DIAGNOSIS — G47.00 INSOMNIA, UNSPECIFIED TYPE: ICD-10-CM

## 2024-11-18 RX ORDER — LURASIDONE HYDROCHLORIDE 20 MG/1
20 TABLET, FILM COATED ORAL DAILY
Qty: 30 TABLET | Refills: 0 | Status: SHIPPED | OUTPATIENT
Start: 2024-11-18

## 2024-11-18 RX ORDER — FLUOXETINE HYDROCHLORIDE 20 MG/1
CAPSULE ORAL
Qty: 30 CAPSULE | Refills: 0 | Status: SHIPPED | OUTPATIENT
Start: 2024-11-18

## 2024-11-18 RX ORDER — TRAZODONE HYDROCHLORIDE 100 MG/1
TABLET ORAL
Qty: 60 TABLET | Refills: 0 | Status: SHIPPED | OUTPATIENT
Start: 2024-11-18

## 2024-11-18 RX ORDER — FLUOXETINE HYDROCHLORIDE 40 MG/1
40 CAPSULE ORAL DAILY
Qty: 30 CAPSULE | Refills: 0 | Status: SHIPPED | OUTPATIENT
Start: 2024-11-18

## 2024-11-27 ENCOUNTER — APPOINTMENT (OUTPATIENT)
Dept: BEHAVIORAL HEALTH | Facility: CLINIC | Age: 59
End: 2024-11-27
Payer: MEDICARE

## 2024-12-12 ENCOUNTER — TELEPHONE (OUTPATIENT)
Dept: TRANSPLANT | Facility: HOSPITAL | Age: 59
End: 2024-12-12
Payer: MEDICARE

## 2024-12-12 DIAGNOSIS — Z94.0 KIDNEY REPLACED BY TRANSPLANT (HHS-HCC): Primary | ICD-10-CM

## 2024-12-12 RX ORDER — MYCOPHENOLATE MOFETIL 250 MG/1
1000 CAPSULE ORAL EVERY 12 HOURS
Qty: 240 CAPSULE | Refills: 0 | Status: SHIPPED | OUTPATIENT
Start: 2024-12-12

## 2024-12-12 NOTE — TELEPHONE ENCOUNTER
[id number] - [place], [place] - [address]  [address]  [place] [address]  Phone: [phone] Fax: [phone]          Mycophenolate (Cellcept)    Patient is expecting this to refilled today, because she is out of meds

## 2024-12-16 ENCOUNTER — HOSPITAL ENCOUNTER (INPATIENT)
Facility: HOSPITAL | Age: 59
LOS: 1 days | Discharge: HOME | End: 2024-12-19
Attending: EMERGENCY MEDICINE | Admitting: STUDENT IN AN ORGANIZED HEALTH CARE EDUCATION/TRAINING PROGRAM
Payer: MEDICARE

## 2024-12-16 DIAGNOSIS — R53.1 LEFT-SIDED WEAKNESS: ICD-10-CM

## 2024-12-16 DIAGNOSIS — F33.8 OTHER RECURRENT DEPRESSIVE DISORDERS: ICD-10-CM

## 2024-12-16 DIAGNOSIS — Z94.0 KIDNEY REPLACED BY TRANSPLANT (HHS-HCC): ICD-10-CM

## 2024-12-16 DIAGNOSIS — R11.0 NAUSEA: ICD-10-CM

## 2024-12-16 DIAGNOSIS — E87.1 HYPONATREMIA: ICD-10-CM

## 2024-12-16 DIAGNOSIS — I63.50 RIGHT PONTINE STROKE (MULTI): Primary | ICD-10-CM

## 2024-12-16 DIAGNOSIS — I63.9 LEFT PONTINE STROKE (MULTI): ICD-10-CM

## 2024-12-16 DIAGNOSIS — I63.89 OTHER CEREBRAL INFARCTION: ICD-10-CM

## 2024-12-16 DIAGNOSIS — Z94.0 HX OF KIDNEY TRANSPLANT (HHS-HCC): ICD-10-CM

## 2024-12-16 DIAGNOSIS — E83.42 HYPOMAGNESEMIA: ICD-10-CM

## 2024-12-16 PROCEDURE — 99285 EMERGENCY DEPT VISIT HI MDM: CPT | Performed by: EMERGENCY MEDICINE

## 2024-12-16 RX ORDER — MYCOPHENOLATE MOFETIL 250 MG/1
1000 CAPSULE ORAL EVERY 12 HOURS
Qty: 240 CAPSULE | Refills: 11 | Status: SHIPPED | OUTPATIENT
Start: 2024-12-16 | End: 2025-12-16

## 2024-12-16 ASSESSMENT — LIFESTYLE VARIABLES
HAVE YOU EVER FELT YOU SHOULD CUT DOWN ON YOUR DRINKING: NO
EVER HAD A DRINK FIRST THING IN THE MORNING TO STEADY YOUR NERVES TO GET RID OF A HANGOVER: NO
TOTAL SCORE: 0
EVER FELT BAD OR GUILTY ABOUT YOUR DRINKING: NO
HAVE PEOPLE ANNOYED YOU BY CRITICIZING YOUR DRINKING: NO

## 2024-12-16 ASSESSMENT — PAIN SCALES - GENERAL
PAINLEVEL_OUTOF10: 0 - NO PAIN
PAINLEVEL_OUTOF10: 0 - NO PAIN

## 2024-12-16 ASSESSMENT — COLUMBIA-SUICIDE SEVERITY RATING SCALE - C-SSRS
2. HAVE YOU ACTUALLY HAD ANY THOUGHTS OF KILLING YOURSELF?: NO
6. HAVE YOU EVER DONE ANYTHING, STARTED TO DO ANYTHING, OR PREPARED TO DO ANYTHING TO END YOUR LIFE?: NO
1. IN THE PAST MONTH, HAVE YOU WISHED YOU WERE DEAD OR WISHED YOU COULD GO TO SLEEP AND NOT WAKE UP?: NO

## 2024-12-16 ASSESSMENT — PAIN - FUNCTIONAL ASSESSMENT: PAIN_FUNCTIONAL_ASSESSMENT: 0-10

## 2024-12-17 ENCOUNTER — APPOINTMENT (OUTPATIENT)
Dept: RADIOLOGY | Facility: HOSPITAL | Age: 59
End: 2024-12-17
Payer: MEDICARE

## 2024-12-17 ENCOUNTER — CLINICAL SUPPORT (OUTPATIENT)
Dept: EMERGENCY MEDICINE | Facility: HOSPITAL | Age: 59
End: 2024-12-17
Payer: MEDICARE

## 2024-12-17 PROBLEM — F33.1 MODERATE EPISODE OF RECURRENT MAJOR DEPRESSIVE DISORDER: Status: ACTIVE | Noted: 2024-12-17

## 2024-12-17 PROBLEM — I63.9 LEFT PONTINE STROKE (MULTI): Status: RESOLVED | Noted: 2024-12-17 | Resolved: 2024-12-17

## 2024-12-17 PROBLEM — E87.1 HYPONATREMIA: Status: ACTIVE | Noted: 2024-12-17

## 2024-12-17 PROBLEM — I63.50 RIGHT PONTINE STROKE (MULTI): Status: ACTIVE | Noted: 2024-12-17

## 2024-12-17 PROBLEM — I63.9 LEFT PONTINE STROKE (MULTI): Status: ACTIVE | Noted: 2024-12-17

## 2024-12-17 LAB
ALBUMIN SERPL BCP-MCNC: 4.1 G/DL (ref 3.4–5)
ALBUMIN SERPL BCP-MCNC: 4.3 G/DL (ref 3.4–5)
ALP SERPL-CCNC: 100 U/L (ref 33–110)
ALP SERPL-CCNC: 101 U/L (ref 33–110)
ALT SERPL W P-5'-P-CCNC: 4 U/L (ref 7–45)
ALT SERPL W P-5'-P-CCNC: 4 U/L (ref 7–45)
ANION GAP SERPL CALC-SCNC: 13 MMOL/L (ref 10–20)
ANION GAP SERPL CALC-SCNC: 15 MMOL/L (ref 10–20)
APTT PPP: 34 SECONDS (ref 27–38)
AST SERPL W P-5'-P-CCNC: 9 U/L (ref 9–39)
AST SERPL W P-5'-P-CCNC: 9 U/L (ref 9–39)
BASOPHILS # BLD AUTO: 0.06 X10*3/UL (ref 0–0.1)
BASOPHILS # BLD AUTO: 0.07 X10*3/UL (ref 0–0.1)
BASOPHILS NFR BLD AUTO: 0.6 %
BASOPHILS NFR BLD AUTO: 0.8 %
BILIRUB DIRECT SERPL-MCNC: 0.1 MG/DL (ref 0–0.3)
BILIRUB SERPL-MCNC: 0.3 MG/DL (ref 0–1.2)
BILIRUB SERPL-MCNC: 0.4 MG/DL (ref 0–1.2)
BUN SERPL-MCNC: 14 MG/DL (ref 6–23)
BUN SERPL-MCNC: 14 MG/DL (ref 6–23)
CALCIUM SERPL-MCNC: 10.3 MG/DL (ref 8.6–10.6)
CALCIUM SERPL-MCNC: 10.6 MG/DL (ref 8.6–10.6)
CHLORIDE SERPL-SCNC: 100 MMOL/L (ref 98–107)
CHLORIDE SERPL-SCNC: 98 MMOL/L (ref 98–107)
CHLORIDE UR-SCNC: 43 MMOL/L
CHLORIDE/CREATININE (MMOL/G) IN URINE: 48 MMOL/G CREAT (ref 38–318)
CHOLEST SERPL-MCNC: 196 MG/DL (ref 0–199)
CHOLESTEROL/HDL RATIO: 3.6
CK SERPL-CCNC: 19 U/L (ref 0–215)
CO2 SERPL-SCNC: 22 MMOL/L (ref 21–32)
CO2 SERPL-SCNC: 22 MMOL/L (ref 21–32)
CREAT SERPL-MCNC: 0.72 MG/DL (ref 0.5–1.05)
CREAT SERPL-MCNC: 0.76 MG/DL (ref 0.5–1.05)
CREAT UR-MCNC: 88.7 MG/DL (ref 20–320)
CRP SERPL-MCNC: 5.62 MG/DL
EGFRCR SERPLBLD CKD-EPI 2021: >90 ML/MIN/1.73M*2
EGFRCR SERPLBLD CKD-EPI 2021: >90 ML/MIN/1.73M*2
EOSINOPHIL # BLD AUTO: 0.19 X10*3/UL (ref 0–0.7)
EOSINOPHIL # BLD AUTO: 0.29 X10*3/UL (ref 0–0.7)
EOSINOPHIL NFR BLD AUTO: 1.9 %
EOSINOPHIL NFR BLD AUTO: 3.3 %
ERYTHROCYTE [DISTWIDTH] IN BLOOD BY AUTOMATED COUNT: 12.9 % (ref 11.5–14.5)
ERYTHROCYTE [DISTWIDTH] IN BLOOD BY AUTOMATED COUNT: 13 % (ref 11.5–14.5)
EST. AVERAGE GLUCOSE BLD GHB EST-MCNC: 103 MG/DL
GLUCOSE SERPL-MCNC: 125 MG/DL (ref 74–99)
GLUCOSE SERPL-MCNC: 140 MG/DL (ref 74–99)
HBA1C MFR BLD: 5.2 %
HCT VFR BLD AUTO: 35.2 % (ref 36–46)
HCT VFR BLD AUTO: 35.9 % (ref 36–46)
HDLC SERPL-MCNC: 54.3 MG/DL
HGB BLD-MCNC: 12.5 G/DL (ref 12–16)
HGB BLD-MCNC: 12.6 G/DL (ref 12–16)
HOLD SPECIMEN: NORMAL
IMM GRANULOCYTES # BLD AUTO: 0.03 X10*3/UL (ref 0–0.7)
IMM GRANULOCYTES # BLD AUTO: 0.03 X10*3/UL (ref 0–0.7)
IMM GRANULOCYTES NFR BLD AUTO: 0.3 % (ref 0–0.9)
IMM GRANULOCYTES NFR BLD AUTO: 0.3 % (ref 0–0.9)
INR PPP: 1 (ref 0.9–1.1)
LDLC SERPL CALC-MCNC: 118 MG/DL
LYMPHOCYTES # BLD AUTO: 2.26 X10*3/UL (ref 1.2–4.8)
LYMPHOCYTES # BLD AUTO: 2.87 X10*3/UL (ref 1.2–4.8)
LYMPHOCYTES NFR BLD AUTO: 22.1 %
LYMPHOCYTES NFR BLD AUTO: 32.8 %
MAGNESIUM SERPL-MCNC: 1.28 MG/DL (ref 1.6–2.4)
MCH RBC QN AUTO: 30.6 PG (ref 26–34)
MCH RBC QN AUTO: 30.7 PG (ref 26–34)
MCHC RBC AUTO-ENTMCNC: 35.1 G/DL (ref 32–36)
MCHC RBC AUTO-ENTMCNC: 35.5 G/DL (ref 32–36)
MCV RBC AUTO: 86 FL (ref 80–100)
MCV RBC AUTO: 87 FL (ref 80–100)
MONOCYTES # BLD AUTO: 0.8 X10*3/UL (ref 0.1–1)
MONOCYTES # BLD AUTO: 0.81 X10*3/UL (ref 0.1–1)
MONOCYTES NFR BLD AUTO: 7.9 %
MONOCYTES NFR BLD AUTO: 9.1 %
NEUTROPHILS # BLD AUTO: 4.7 X10*3/UL (ref 1.2–7.7)
NEUTROPHILS # BLD AUTO: 6.86 X10*3/UL (ref 1.2–7.7)
NEUTROPHILS NFR BLD AUTO: 53.7 %
NEUTROPHILS NFR BLD AUTO: 67.2 %
NON HDL CHOLESTEROL: 142 MG/DL (ref 0–149)
NRBC BLD-RTO: 0 /100 WBCS (ref 0–0)
NRBC BLD-RTO: 0 /100 WBCS (ref 0–0)
OSMOLALITY SERPL: 275 MOSM/KG (ref 280–300)
OSMOLALITY UR: 356 MOSM/KG (ref 200–1200)
PHOSPHATE SERPL-MCNC: 3.5 MG/DL (ref 2.5–4.9)
PLATELET # BLD AUTO: 323 X10*3/UL (ref 150–450)
PLATELET # BLD AUTO: 325 X10*3/UL (ref 150–450)
POTASSIUM SERPL-SCNC: 4 MMOL/L (ref 3.5–5.3)
POTASSIUM SERPL-SCNC: 4 MMOL/L (ref 3.5–5.3)
POTASSIUM UR-SCNC: 42 MMOL/L
POTASSIUM/CREAT UR-RTO: 47 MMOL/G CREAT
PROCALCITONIN SERPL-MCNC: 0.03 NG/ML
PROT SERPL-MCNC: 7.4 G/DL (ref 6.4–8.2)
PROT SERPL-MCNC: 7.8 G/DL (ref 6.4–8.2)
PROTHROMBIN TIME: 11.5 SECONDS (ref 9.8–12.8)
RBC # BLD AUTO: 4.08 X10*6/UL (ref 4–5.2)
RBC # BLD AUTO: 4.11 X10*6/UL (ref 4–5.2)
SODIUM SERPL-SCNC: 129 MMOL/L (ref 136–145)
SODIUM SERPL-SCNC: 133 MMOL/L (ref 136–145)
SODIUM UR-SCNC: 36 MMOL/L
SODIUM/CREAT UR-RTO: 41 MMOL/G CREAT
TACROLIMUS BLD-MCNC: 6.5 NG/ML
TRIGL SERPL-MCNC: 118 MG/DL (ref 0–149)
TSH SERPL-ACNC: 1.02 MIU/L (ref 0.44–3.98)
VLDL: 24 MG/DL (ref 0–40)
WBC # BLD AUTO: 10.2 X10*3/UL (ref 4.4–11.3)
WBC # BLD AUTO: 8.8 X10*3/UL (ref 4.4–11.3)

## 2024-12-17 PROCEDURE — 70496 CT ANGIOGRAPHY HEAD: CPT | Performed by: RADIOLOGY

## 2024-12-17 PROCEDURE — 99221 1ST HOSP IP/OBS SF/LOW 40: CPT | Performed by: HOSPITALIST

## 2024-12-17 PROCEDURE — 83735 ASSAY OF MAGNESIUM: CPT

## 2024-12-17 PROCEDURE — 80053 COMPREHEN METABOLIC PANEL: CPT

## 2024-12-17 PROCEDURE — 2500000004 HC RX 250 GENERAL PHARMACY W/ HCPCS (ALT 636 FOR OP/ED)

## 2024-12-17 PROCEDURE — 70450 CT HEAD/BRAIN W/O DYE: CPT | Performed by: RADIOLOGY

## 2024-12-17 PROCEDURE — 85610 PROTHROMBIN TIME: CPT

## 2024-12-17 PROCEDURE — 80061 LIPID PANEL: CPT

## 2024-12-17 PROCEDURE — 85025 COMPLETE CBC W/AUTO DIFF WBC: CPT

## 2024-12-17 PROCEDURE — G0378 HOSPITAL OBSERVATION PER HR: HCPCS

## 2024-12-17 PROCEDURE — 82436 ASSAY OF URINE CHLORIDE: CPT

## 2024-12-17 PROCEDURE — 99223 1ST HOSP IP/OBS HIGH 75: CPT | Performed by: STUDENT IN AN ORGANIZED HEALTH CARE EDUCATION/TRAINING PROGRAM

## 2024-12-17 PROCEDURE — 99223 1ST HOSP IP/OBS HIGH 75: CPT

## 2024-12-17 PROCEDURE — 80197 ASSAY OF TACROLIMUS: CPT

## 2024-12-17 PROCEDURE — 83036 HEMOGLOBIN GLYCOSYLATED A1C: CPT

## 2024-12-17 PROCEDURE — 84100 ASSAY OF PHOSPHORUS: CPT

## 2024-12-17 PROCEDURE — 70551 MRI BRAIN STEM W/O DYE: CPT

## 2024-12-17 PROCEDURE — 96372 THER/PROPH/DIAG INJ SC/IM: CPT

## 2024-12-17 PROCEDURE — 2500000001 HC RX 250 WO HCPCS SELF ADMINISTERED DRUGS (ALT 637 FOR MEDICARE OP)

## 2024-12-17 PROCEDURE — 70551 MRI BRAIN STEM W/O DYE: CPT | Performed by: RADIOLOGY

## 2024-12-17 PROCEDURE — 36415 COLL VENOUS BLD VENIPUNCTURE: CPT | Performed by: EMERGENCY MEDICINE

## 2024-12-17 PROCEDURE — 86140 C-REACTIVE PROTEIN: CPT

## 2024-12-17 PROCEDURE — 36415 COLL VENOUS BLD VENIPUNCTURE: CPT

## 2024-12-17 PROCEDURE — 83935 ASSAY OF URINE OSMOLALITY: CPT

## 2024-12-17 PROCEDURE — 2550000001 HC RX 255 CONTRASTS: Performed by: EMERGENCY MEDICINE

## 2024-12-17 PROCEDURE — 83930 ASSAY OF BLOOD OSMOLALITY: CPT

## 2024-12-17 PROCEDURE — 70450 CT HEAD/BRAIN W/O DYE: CPT

## 2024-12-17 PROCEDURE — 82550 ASSAY OF CK (CPK): CPT

## 2024-12-17 PROCEDURE — 84145 PROCALCITONIN (PCT): CPT

## 2024-12-17 PROCEDURE — 93005 ELECTROCARDIOGRAM TRACING: CPT

## 2024-12-17 PROCEDURE — 2500000002 HC RX 250 W HCPCS SELF ADMINISTERED DRUGS (ALT 637 FOR MEDICARE OP, ALT 636 FOR OP/ED)

## 2024-12-17 PROCEDURE — 84443 ASSAY THYROID STIM HORMONE: CPT

## 2024-12-17 PROCEDURE — 96374 THER/PROPH/DIAG INJ IV PUSH: CPT | Mod: 59

## 2024-12-17 PROCEDURE — 70498 CT ANGIOGRAPHY NECK: CPT

## 2024-12-17 PROCEDURE — 70498 CT ANGIOGRAPHY NECK: CPT | Performed by: RADIOLOGY

## 2024-12-17 RX ORDER — TRAZODONE HYDROCHLORIDE 50 MG/1
200 TABLET ORAL NIGHTLY
Status: DISCONTINUED | OUTPATIENT
Start: 2024-12-17 | End: 2024-12-19 | Stop reason: HOSPADM

## 2024-12-17 RX ORDER — ONDANSETRON 4 MG/1
4 TABLET, ORALLY DISINTEGRATING ORAL EVERY 8 HOURS PRN
Status: DISCONTINUED | OUTPATIENT
Start: 2024-12-17 | End: 2024-12-19 | Stop reason: HOSPADM

## 2024-12-17 RX ORDER — AMLODIPINE BESYLATE 5 MG/1
5 TABLET ORAL DAILY
Status: DISCONTINUED | OUTPATIENT
Start: 2024-12-17 | End: 2024-12-19 | Stop reason: HOSPADM

## 2024-12-17 RX ORDER — LURASIDONE HYDROCHLORIDE 40 MG/1
40 TABLET, FILM COATED ORAL
Status: DISCONTINUED | OUTPATIENT
Start: 2024-12-17 | End: 2024-12-19 | Stop reason: HOSPADM

## 2024-12-17 RX ORDER — ENOXAPARIN SODIUM 100 MG/ML
40 INJECTION SUBCUTANEOUS DAILY
Status: DISCONTINUED | OUTPATIENT
Start: 2024-12-17 | End: 2024-12-19 | Stop reason: HOSPADM

## 2024-12-17 RX ORDER — METOCLOPRAMIDE HYDROCHLORIDE 5 MG/ML
10 INJECTION INTRAMUSCULAR; INTRAVENOUS ONCE
Status: COMPLETED | OUTPATIENT
Start: 2024-12-17 | End: 2024-12-17

## 2024-12-17 RX ORDER — ACETAMINOPHEN 325 MG/1
975 TABLET ORAL ONCE
Status: COMPLETED | OUTPATIENT
Start: 2024-12-17 | End: 2024-12-17

## 2024-12-17 RX ORDER — TACROLIMUS 1 MG/1
2 CAPSULE ORAL EVERY 12 HOURS
COMMUNITY

## 2024-12-17 RX ORDER — FENOFIBRATE 160 MG/1
160 TABLET ORAL DAILY
Status: DISCONTINUED | OUTPATIENT
Start: 2024-12-17 | End: 2024-12-17

## 2024-12-17 RX ORDER — FLUOXETINE HYDROCHLORIDE 20 MG/1
20 CAPSULE ORAL DAILY
Status: DISCONTINUED | OUTPATIENT
Start: 2024-12-17 | End: 2024-12-19 | Stop reason: HOSPADM

## 2024-12-17 RX ORDER — CLOPIDOGREL BISULFATE 75 MG/1
75 TABLET ORAL DAILY
Status: DISCONTINUED | OUTPATIENT
Start: 2024-12-17 | End: 2024-12-19 | Stop reason: HOSPADM

## 2024-12-17 RX ORDER — TACROLIMUS 1 MG/1
2 CAPSULE ORAL
Status: DISCONTINUED | OUTPATIENT
Start: 2024-12-17 | End: 2024-12-19 | Stop reason: HOSPADM

## 2024-12-17 RX ORDER — POLYETHYLENE GLYCOL 3350 17 G/17G
17 POWDER, FOR SOLUTION ORAL DAILY PRN
Status: DISCONTINUED | OUTPATIENT
Start: 2024-12-17 | End: 2024-12-19 | Stop reason: HOSPADM

## 2024-12-17 RX ORDER — MYCOPHENOLATE MOFETIL 250 MG/1
1000 CAPSULE ORAL EVERY 12 HOURS
Status: DISCONTINUED | OUTPATIENT
Start: 2024-12-17 | End: 2024-12-19 | Stop reason: HOSPADM

## 2024-12-17 RX ORDER — ATORVASTATIN CALCIUM 80 MG/1
80 TABLET, FILM COATED ORAL DAILY
Status: DISCONTINUED | OUTPATIENT
Start: 2024-12-17 | End: 2024-12-19 | Stop reason: HOSPADM

## 2024-12-17 RX ORDER — NAPROXEN SODIUM 220 MG/1
81 TABLET, FILM COATED ORAL DAILY
Status: DISCONTINUED | OUTPATIENT
Start: 2024-12-17 | End: 2024-12-19 | Stop reason: HOSPADM

## 2024-12-17 RX ORDER — PANTOPRAZOLE SODIUM 20 MG/1
20 TABLET, DELAYED RELEASE ORAL
Status: DISCONTINUED | OUTPATIENT
Start: 2024-12-17 | End: 2024-12-19 | Stop reason: HOSPADM

## 2024-12-17 RX ORDER — FLUOXETINE HYDROCHLORIDE 20 MG/1
40 CAPSULE ORAL DAILY
Status: DISCONTINUED | OUTPATIENT
Start: 2024-12-17 | End: 2024-12-19 | Stop reason: HOSPADM

## 2024-12-17 ASSESSMENT — PAIN SCALES - GENERAL
PAINLEVEL_OUTOF10: 9
PAINLEVEL_OUTOF10: 3
PAINLEVEL_OUTOF10: 7

## 2024-12-17 ASSESSMENT — PAIN DESCRIPTION - PROGRESSION: CLINICAL_PROGRESSION: GRADUALLY IMPROVING

## 2024-12-17 ASSESSMENT — PAIN DESCRIPTION - LOCATION
LOCATION: HEAD
LOCATION: HEAD

## 2024-12-17 ASSESSMENT — PAIN - FUNCTIONAL ASSESSMENT: PAIN_FUNCTIONAL_ASSESSMENT: 0-10

## 2024-12-17 NOTE — PROGRESS NOTES
Viki Diez is a 59 y.o. female on day 0 of admission presenting with Hyponatremia.      Subjective   Pt seen at bedside. No acute complaints. Had questions about CTA and any interactions with her kidneys; pt amenable to getting CTA now.        Objective     Last Recorded Vitals  /90 (Patient Position: Lying)   Pulse 77   Temp 36 °C (96.8 °F) (Temporal)   Resp 18   Wt 55.8 kg (123 lb)   SpO2 98%   Intake/Output last 3 Shifts:  No intake or output data in the 24 hours ending 12/17/24 1524    Admission Weight  Weight: 55.8 kg (123 lb) (12/16/24 2147)    Daily Weight  12/16/24 : 55.8 kg (123 lb)    Image Results  MR brain wo IV contrast  Narrative: Interpreted By:  Jean Leone and Ritchie Brandon   STUDY:  MR BRAIN WO IV CONTRAST;  12/17/2024 8:00 am      INDICATION:  Signs/Symptoms:Concern for Left sided weakness, normal CT. Left-sided  motor weakness that started 4-5 days ago.      COMPARISON:  CT head 12/17/2024.      ACCESSION NUMBER(S):  [id number]      ORDERING CLINICIAN:  TRACY MARIEE      TECHNIQUE:  Noncontrast axial T2, FLAIR, DWI, gradient echo T2 and sagittal and  coronal T1 weighted images of brain were acquired.      FINDINGS:  CSF Spaces: The ventricles, sulci and basal cisterns are within  normal limits with mild-to-moderate generalized brain atrophy. No  extra-axial fluid collection.      Parenchyma: There is a focus of diffusion restriction signal  abnormality measuring approximately 1.4 x 0.6 cm in size (series 2,  image 60) in the right paramedian fredo with corresponding  hyperintense signal on T2/FLAIR sequences which can be seen in the  setting of acute to early subacute infarction.There is no mass effect  or midline shift. No evidence of hemorrhagic transformation.      There are moderate to severe burden of T2/FLAIR hyperintensities  within the supratentorial white matter, which are favored to reflect  sequela of chronic small-vessel ischemic disease. Foci of  GRE  hypointensity along the anterior right frontal region may reflect  remote hemorrhagic insults. Small remote left cerebellar infarcts.      Paranasal Sinuses and Mastoids: Trace fluid within the inferior left  mastoid. Otherwise visualized paranasal sinuses and mastoid air cells  are unremarkable.      Impression: 1. Acute to early subacute infarction in the right paramedian fredo.  No mass effect or hemorrhagic transformation.  2. Moderate to severe chronic microvascular changes as above. Small  remote left cerebellar infarcts.      I personally reviewed the images/study and I agree with the findings  as stated by resident Ross Najera. This study was interpreted  at [place], [place].      MACRO:  Ross Najera discussed the significance and urgency of this  critical finding by epic secure chat with  ANA JR on  12/17/2024 at 8:52 am.  (**-RCF-**) Findings:  See findings.      Signed by: Jean Leone 12/17/2024 11:24 AM  Dictation workstation:   [id number]  CT head wo IV contrast  Narrative: Interpreted By:  Ida Beck and Sheng Max   STUDY:  CT HEAD WO IV CONTRAST;  12/17/2024 12:24 am      INDICATION:  Signs/Symptoms:LUE/LLE weakness and headache.          COMPARISON:  CT head 07/23/2022      ACCESSION NUMBER(S):  [id number]      ORDERING CLINICIAN:  TRACY MARIEE      TECHNIQUE:  Noncontrast axial CT scan of head was performed. Angled reformats in  brain and bone windows were generated. The images were reviewed in  bone, brain, blood and soft tissue windows.      FINDINGS:      BRAIN PARENCHYMA: Gray-white matter interfaces are preserved. No  mass, mass effect or midline shift. Mild deep and periventricular  white matter hypodensities are nonspecific, but favored to represent  chronic small vessel ischemic changes.      HEMORRHAGE: No acute intracranial hemorrhage.  VENTRICLES and EXTRA-AXIAL SPACES: Mild volume loss with prominence  of the  ventricles and sulci. EXTRACRANIAL SOFT TISSUES: Within normal  limits. PARANASAL SINUSES/MASTOIDS: The visualized paranasal sinuses  and mastoid air cells are aerated. CALVARIUM: No depressed skull  fracture. No destructive osseous lesion.      OTHER FINDINGS: None.      Impression: No acute intracranial abnormality. If symptoms persist or there is  high clinical suspicion further evaluation with MRI can be considered.          I personally reviewed the images/study and I agree with the findings  as stated by Dr. Arturo Maciel. This study was interpreted at [place], [place].      MACRO:  None      Signed by: Ida Beck 12/17/2024 12:42 AM  Dictation workstation:   [id number]      Physical Exam  Gen: well appearing, A+Ox3, in no acute distress  HEENT: sclera anicteric, no conjunctival injection, MMM  Resp: CTAB, normal breath sounds, no wheezing/crackles  CV: RRR, normal S1/S2, no murmurs  GI: non-tender, non-distended, no rebound or guarding  Extremities/MSK: warm and well perfused, no edema bilateral  Neuro: A+Ox3, LUE 4/5, LLE 3/5 strength, sensation intact b/l; no focal CN deficit, normal cerebellar exam; gait not assessed  Skin: warm and dry, no lesions, no rashes     Relevant Results  Scheduled medications  amLODIPine, 5 mg, oral, Daily  aspirin, 81 mg, oral, Daily  atorvastatin, 80 mg, oral, Daily  clopidogrel, 75 mg, oral, Daily  enoxaparin, 40 mg, subcutaneous, Daily  FLUoxetine, 20 mg, oral, Daily  FLUoxetine, 40 mg, oral, Daily  iohexol, 90 mL, intravenous, Once in imaging  lurasidone, 40 mg, oral, Daily with lunch  mycophenolate, 1,000 mg, oral, q12h  pantoprazole, 20 mg, oral, Daily before breakfast  tacrolimus, 2 mg, oral, q12h SHERICE  traZODone, 200 mg, oral, Nightly      Continuous medications     PRN medications  PRN medications: ondansetron ODT, polyethylene glycol       Assessment/Plan      Viki Diez is a 59 y.o. female with  past medical history  of HTN and obstructive uropathy/nephrolithiasis s/p kidney transplant (DDRT 2015) on mycophenolate and tacrolimus, PRUDENCIO, and Depression.  She presented to the ED with left sided motor weakness that started 4 to 5 days ago, CT brain without evidence of acute intracranial abnormality, no symptoms or signs suggestive of infectious etiology. She was admitted under medical care for further workup and evaluation.  Patient is currently hemodynamically stable well-appearing, with no infectious signs and symptoms; antibiotics for infectious etiology were not were not started but low threshold if c/f infection or signs of hemodynamic compromise.  Hyponatremia improving on repeat labs.  Will consult neuro and transplant nephrology to coordinate care while admitted.      #Left sided motor weakness  :: started 4-5d ago, came to hospital now per her daughter's insistence   :: EKG on admission: NSR  :: CT brain without acute insult/bleed  :: MRI brain with evidence of acute to early subacute infarction in the right paramedian fredo., WITHOUT mass effect or hemorrhagic transformation    PLAN  - Neurology consulted, recs below:   -CTA H+N   -TTE with bubble study   -DAPT for 21d   -Atorvastatin 80mg   -outpt f/u in [facility] (neuro team will arrange at [place])  - TTE ordered to r/o PFO  - CTA ordered, stat  - f/u Lipid profile, TSH, HbA1c     # Euvolemic Hyponatremia  :: Serum and urine osmolality ordered  :: Unclear cause at this time could be related to medication.  :: The patient's lurasidone dose was recently increased in the past few weeks from 20 mg to 40 mg  :: improved from 129 > 133    PLAN  - monitor I&O  - cont to monitor Na on daily RFPs  - consider tapering down lurisadone if am labs reflect worsening hyponatremia and work up      # HTN  PLAN  - Continue Amlodipine     # Obstructive uropathy/nephrolithiasis s/p kidney transplant (DDRT 2015) on mycophenolate and tacrolimus,   :: tacrolimus level 6.5 at 5:39am  PLAN  -  Continue home dose of tacrolimus 2 mg twice daily, CellCept 1000 mg twice daily.  [ ] Transplant nephrology consulted, f/u recs     # PRUDENCIO, Depression.  # Insomnia  :: On Prozac 60 mg daily, lurasidone 40 mg daily, trazodone 200 mg nightly  :: The patient's lurasidone dose was recently increased in the past few weeks from 20 mg to 40 mg   PLAN  -c/w  Prozac 60 mg daily, lurasidone 40 mg daily, trazodone 200 mg nightly      F: Replete PRN  N: Regular diet  A: PIV  O2: On RA  DVT ppx: Enox SQ  GI ppx: -   Code Status: Full code  Contact: George Diez (Sibling)  [phone] (Work Phone)     Assessment & Plan  Hyponatremia                  Apryl Rowe MD  Internal Medicine PGY1

## 2024-12-17 NOTE — H&P
- Not finalized until signed by the attending-    [facility]  December 17, 2024   Patient: Viki Diez    Medical Record: [id number]    SUBJECTIVE     Viki Diez is a 59 y.o. female with  past medical history of HTN and obstructive uropathy/nephrolithiasis s/p kidney transplant (DDRT 2015) on mycophenolate and tacrolimus, PRUDENCIO, Depression.  Left sided motor Weakness that started 4 to 5 days ago, CT brain without evidence of acute intracranial abnormality, no symptoms or signs suggestive of infectious etiology, the patient was admitted under medical care for further workup and evaluation.     The patient presented with a history of left-sided weakness that started about 4 to 5 days ago when she woke up from sleep feeling weaker on the left upper limb and left lower limb, she noted feeling unstable on her feet and feels like her left side of the body cannot support her, does not report any falls.  Her symptoms are stable since 4 to 5 days notes no progression in her symptoms.  Does not report any dysarthria, difficulty swallowing, any mouth deviation.  Denies any photophobia, neck rigidity, fever, neck pain or back pain, no abnormal movements, denies any flulike symptoms, or muscle aches.  No rashes or joint pain.  No chest pain, palpitation, shortness of breath or cough.  No change in appetite.  The patient mentioned that her daughter told her to come to the hospital and that is why she presented today.  She denies having any previous similar episodes.  No GI or urinary symptoms.  No history of recent travel, no contact with sick patients.    12 point ROS otherwise negative, unless indicated above.     In the ED:  Vital signs: Afebrile, heart rate 70s to 80s, on room air saturating 100%, blood pressure 150s/ 70s  Labs: WBC 10.2, hemoglobin 12.6, platelets 323  Sodium 129, potassium 4, bicarb 22, creatinine 0.7, BUN 14, ALT and AST 4/9  Imaging: No acute intracranial abnormality.    EKG: NSR  ED Course: Given 1 dose of Tylenol 975 mg, Reglan 10 mg once    Transplant Hx;  Primary Cause of Organ Disease: Congenital Obstructive Uropathy.   Donor/Transplant Type: A  donor renal transplant on: 9/3/2015,~A living donor renal transplant on: 10/25/2005.   Transplant Course: standard thymoglobulin induction,~No DGF,~No CMV mismatch~and~No EBV mismatch.   Rejections: Other: Antibody-mediated rejection in 2008, received IVIG treatment.Antibody-mediated rejection in 2007, treated with IVIG and sirolimus therapy.    Infection: No episodes of infection.   Limitations: Not working due to arm/shoulder pain.   Subjective: Ms. Viki Diez is a 58-year-old female with a past medical history of hypertension and obstructive uropathy/nephrolithiasis s/p living related transplant in  which failed due to antibody mediated rejection and was later transplanted with a  kidney transplant on 9/3/2015.    Scheduled Medications:  PRN Medications:    amLODIPine, 5 mg, oral, Daily  fenofibrate, 160 mg, oral, Daily  FLUoxetine, 20 mg, oral, Daily  FLUoxetine, 40 mg, oral, Daily  lurasidone, 40 mg, oral, Daily with lunch  mycophenolate, 1,000 mg, oral, q12h  pantoprazole, 20 mg, oral, Daily before breakfast  tacrolimus, 2 mg, oral, q12h SHERICE  traZODone, 200 mg, oral, Nightly  vitamin B complex-vitamin C-folic acid, 1 capsule, oral, Daily     PRN medications: ondansetron ODT, polyethylene glycol       Surgical History:    Past Surgical History:   Procedure Laterality Date    HYSTEROSCOPY  2014    Hysteroscopy With Endometrial Ablation    OTHER SURGICAL HISTORY  2014    Blood Transfusion (___ Ml)    OTHER SURGICAL HISTORY  2015    Peritoneal Dialysis Catheter       Allergies:    Allergies   Allergen Reactions    Ibuprofen Other     Can not take due to kidneys       Social History  Living Situation: Lives with her fiance   Alcohol: socially about once a week    Tobacco:  "smokes about 1/2 a pack a day  Illicit Drugs: denies    OBJECTIVE     Vitals: I/O:   Vitals:    12/17/24 0350   BP: 130/86   Pulse: 56   Resp: 16   Temp:    SpO2: 98%        24hr Min/Max:  Temp  Min: 36 °C (96.8 °F)  Max: 36 °C (96.8 °F)  Pulse  Min: 56  Max: 86  BP  Min: 130/86  Max: 155/89  Resp  Min: 16  Max: 18  SpO2  Min: 98 %  Max: 100 % No intake or output data in the 24 hours ending 12/17/24 0505     Net IO Since Admission: No IO data has been entered for this period [12/17/24 0505]      Physical Exam  Gen: well appearing, A+Ox3, in no acute distress  HEENT: sclera anicteric, no conjunctival injection, MMM  Resp: CTAB, normal breath sounds, no wheezing/crackles  CV: RRR, normal S1/S2, no murmurs  GI: non-tender, non-distended, no rebound or guarding  Extremities/MSK: warm and well perfused, no edema bilateral  Neuro: A+Ox3,   Left upper extremity power 4 out of 5 proximal and distal, intact sensation  Left lower extremity power 3-4 out of 5 more pronounced proximally, intact sensation  No cranial nerve deficit  Normal cerebellar exam  Gait was not assessed  Skin: warm and dry, no lesions, no rashes       LABS:  CBC RFP   Lab Results   Component Value Date    WBC 10.2 12/17/2024    HGB 12.6 12/17/2024    HCT 35.9 (L) 12/17/2024    MCV 87 12/17/2024     12/17/2024    NEUTROABS 6.86 12/17/2024    Lab Results   Component Value Date     (L) 12/17/2024    K 4.0 12/17/2024    CL 98 12/17/2024    CO2 22 12/17/2024    BUN 14 12/17/2024    CREATININE 0.76 12/17/2024    CREATININE 0.77 05/08/2024     Lab Results   Component Value Date    MG 1.75 05/08/2024    PHOS 3.9 05/08/2024    CALCIUM 10.6 12/17/2024         Hepatic Function ABG/VBG   Lab Results   Component Value Date    ALT 4 (L) 12/17/2024    AST 9 12/17/2024    ALKPHOS 100 12/17/2024     No results found for: \"TBILIHCVFS\", \"BILIDIR\"   Lab Results   Component Value Date    PROTIME 10.9 07/23/2022    APTT 38 07/23/2022    INR 0.9 07/23/2022    Lab " Results   Component Value Date    LACTATE 2.5 (H) 07/23/2022            Imaging:     === 12/16/24 ===    CT HEAD WO IV CONTRAST    - Impression -  No acute intracranial abnormality. If symptoms persist or there is  high clinical suspicion further evaluation with MRI can be considered.      ASSESSMENT / PLAN     Viki Diez is a 59 y.o. female with  past medical history of HTN and obstructive uropathy/nephrolithiasis s/p kidney transplant (DDRT 2015) on mycophenolate and tacrolimus, PRUDENCIO, Depression.  Left sided motor Weakness that started 4 to 5 days ago, CT brain without evidence of acute intracranial abnormality, no symptoms or signs suggestive of infectious etiology, the patient was admitted under medical care for further workup and evaluation.  Stat MRI was ordered, the patient is currently hemodynamically stable well-appearing,  antibiotics for infectious etiology was not were not started low threshold to start if any concern with hemodynamic compromise or change in symptoms/ signs.  Also presented with mild hyponatremia 129, it is less likely to be implicated in the patient presentation because of the focality of her symptoms.    # Left sided motor Weakness that started 4 - 5 days ago.   :: EKG: NSR  :: CT brain no acute insult.    - Stat Brain MRI ordered  - Neurology consult after MRI ( pt seen at 5 am)   - Consider ECHO, Carotid US  - ordered Lipid profile, TSH, HbA1c    # Euvolemic Hyponatremia  :: Serum and urine osmolality ordered  :: Unclear cause at this time could be related to medication.  :: The patient's lurasidone dose was recently increased in the past few weeks from 20 mg to 40 mg    - Accurate I&O  - consider tapering down lurisadone if am labs reflect worsening hyponatremia and work up     # HTN  - Continue Amlodipine    # Obstructive uropathy/nephrolithiasis s/p kidney transplant (DDRT 2015) on mycophenolate and tacrolimus,    - Continue home dose of tacrolimus 2 mg twice daily, CellCept  1000 mg twice daily.  -Am  tacro trough ordered  - Transplant nephrology consulted    # PRUDENCIO, Depression.  # Insomnia  :: On Prozac 60 mg daily, lurasidone 40 mg daily, trazodone 200 mg nightly  :: The patient's lurasidone dose was recently increased in the past few weeks from 20 mg to 40 mg    -The patient is on the previous mentioned medication long-term, abrupt discontinuation might precipitate withdrawal syndrome that medication were not held overnight that consider tapering down medication if hyponatremia worsens and other causes excluded.    F: Replete PRN  N: Regular diet  A: PIV  O2: On RA  DVT ppx: Enox SQ  GI ppx: -   Code Status: Full code  Contact: George Diez (Sibling)  [phone] (Work Phone)     Rosalinda Moreno, PGY2 Internal Medicine

## 2024-12-17 NOTE — PROGRESS NOTES
Pharmacy Medication History Review    Viki Diez is a 59 y.o. female admitted for Hyponatremia. Pharmacy reviewed the patient's [id number] medications and allergies for accuracy.    Medications ADDED:  Tacrolimus 1 mg capsule - 2 capsules BID  Medications CHANGED:  Magnesium oxide - added dose and sig   Ondansetron 4 mg ODT - added sig   Amlodipine 5 mg daily to nightly   Omeprazole 20 mg - daily to nightly   Medications REMOVED:   Acetaminophen 650 mg ER    Amlodipine 5 mg - duplicate   Amoxicillin 500 mg   Amox-clav 875-125 mg   Nephrocaps   Renocaps   CHG mouthwash   Fenofibrate 135 mg DR cap  Cinacalcet 30 mg   Ciprofloxacin 500 mg   Clonidine 0.1 mg   Cyclobenzaprine 10 mg   Cyclobenzaprine   Diphenhydramine 25 mg   Enoxaparin 40 mg   Estrogen cream (Premarin)   Gentamicin 0.1% cream   Hydrocodone-acetaminophen 5-325 mg   Labetalol 200 mg   Magnesium oxide - duplicate   Nifedipine ER 60 mg   Nifedipine ER 60 mg   Nitrofurantion mac/mon 100 mg   Omeprazole 20 mg - duplicate  Ondansetron 4 mg ODT - duplicate      The list below reflects the updated PTA list.   Prior to Admission Medications   Prescriptions Last Dose Informant   FLUoxetine (PROzac) 20 mg capsule 12/16/2024 Noon Self   Sig: Take 1 capsule (20 mg) by mouth once daily. TAKE ONE CAPSULE BY MOUTH ONCE DAILY. TAKE WITH 40 MG FOR A TOTAL OF 60 MG   Patient taking differently: Take 1 capsule (20 mg) by mouth once daily. At noon   FLUoxetine (PROzac) 40 mg capsule 12/16/2024 Noon Self   Sig: Take 1 capsule (40 mg) by mouth once daily.   Patient taking differently: Take 1 capsule (40 mg) by mouth once daily. At noon   amLODIPine (Norvasc) 5 mg tablet 12/16/2024 Bedtime Self   Sig: Take 1 tablet (5 mg) by mouth once daily.   Patient taking differently: Take 1 tablet (5 mg) by mouth once daily at bedtime.   lurasidone (Latuda) 40 mg tablet 12/16/2024 Noon Self   Sig: Take 1 tablet (40 mg) by mouth once daily at noon. Take with meals.   magnesium  oxide (Mag-Ox) 400 mg tablet 12/12/2024 at 11:30 PM Self   Sig: Take 1 tablet (400 mg) by mouth 2 times a day.   mycophenolate (Cellcept) 250 mg capsule 12/16/2024 at 10:00 PM Self   Sig: Take 4 capsules (1,000 mg) by mouth every 12 hours.   omeprazole (PriLOSEC) 20 mg DR capsule 12/16/2024 at 10:00 PM Self   Sig: Take 1 capsule (20 mg) by mouth once daily in the morning. Take before meals.   Patient taking differently: Take 1 capsule (20 mg) by mouth once daily at bedtime.   ondansetron ODT (Zofran-ODT) 4 mg disintegrating tablet Past Week Self   Sig: Dissolve 1 tablet (4 mg) in the mouth every 8 hours if needed for nausea or vomiting.   tacrolimus (Prograf) 1 mg capsule 12/16/2024 at 10:00 PM Self   Sig: Take 2 capsules (2 mg) by mouth every 12 hours.   traZODone (Desyrel) 100 mg tablet 12/15/2024 at 11:30 PM Self   Sig: Take 2 tablets (200 mg) by mouth once daily at bedtime.      Facility-Administered Medications: None        The list below reflects the updated allergy list. Please review each documented allergy for additional clarification and justification.  Allergies  Reviewed by Florentino Bynum RN on 12/16/2024        Severity Reactions Comments    Ibuprofen Not Specified Other Can not take due to kidneys            Patient accepts M2B at discharge.   Local pharmacy:   Rank Name Phone Address   [id number] - [place], [place] - [address] [facility] [phone] [address] [place] [id number]       Sources:   Patient interview - excellent historian   Dispense history   OARRS     Additional Comments:  Patient takes medications at 1000/1200/2200/0000 daily.   Patient reports she has no magnesium oxide 400 mg at home. Last dose a few days ago. Requesting prescription to use insurance.       1000: MMF 1000 mg, tacrolimus 2 mg   1200: lurasidone 40 mg, fluoxetine 60 mg, magnesium oxide 400 mg   2200: MMF 1000 mg, tacrolimus 2 mg, amlodipine 5 mg, omeprazole 20 mg  0000: magnesium oxide 400 mg, trazodone 200 mg  "      George Calixto, PharmD  Transitions of Care Pharmacist  12/17/24 10:20 AM     Secure Chat preferred   If no response call [id number] or Vocera \"Med Rec\"     "

## 2024-12-17 NOTE — PROGRESS NOTES
Pharmacy Admission Order Reconciliation Review    Viki Diez is a 59 y.o. female admitted for Hyponatremia. Pharmacy reviewed the patient's unreconciled admission medications.    Prior to admission medications that were reviewed and acted on by the pharmacist include:  Tacrolimus 2 mg bid - already ordered by team   These medications have been reconciled.     Any other unreconcilied medications have been addressed and will be ordered or held by the patient's medical team. Medications addressed by the pharmacist may be added or changed by the patient's medical team at any time.    George Calixto, PharmD  Transitions of Care Pharmacist

## 2024-12-17 NOTE — HOSPITAL COURSE
Viki Diez is a 59 y.o. female with  past medical history of HTN and obstructive uropathy/nephrolithiasis s/p kidney transplant (DDRT 2015) on mycophenolate and tacrolimus, PRUDENCIO, and Depression.  She presented to the ED with left sided motor weakness that started 4 to 5 days ago, CT brain without evidence of acute intracranial abnormality, no symptoms or signs suggestive of infectious etiology. She was admitted under medical care for further workup and evaluation.  Patient is currently hemodynamically stable well-appearing, with no infectious signs and symptoms; antibiotics for infectious etiology were not were not started but low threshold if c/f infection or signs of hemodynamic compromise.  Hyponatremia improving on repeat labs.  Will consult neuro and transplant nephrology to coordinate care while admitted.     Neuro recommended CTA H/N, TTE with bubble study, continue DAPT, start atorva 80, and will plan for outpt stroke clinic followup. CTA with notable stenosis of R carotid bifurcation (80%). No acute intervention indicated per neuro. Still pending TTE as of 12/18. Transplant nephro following, recommended cont immunosuppressive meds and tacro trough levels. Their team preferred to monitor her for another day with ADOD 12/19. Echo completed on 12/18 was grossly normal but with decreased sensitivity for intracardiac shunt. Discharge recs from neurology included continuing DAPT for 21d followed by ASA monotherapy, stroke outpatient follow-up, and to cont atorvastatin 80mg. She was discharge home on 12/19.

## 2024-12-17 NOTE — ED TRIAGE NOTES
Pt reports that she started experiencing left arm and left leg weakness X 5 days, pt has a PMHx of a kidney transplant in 2015, she also reports a frontal headache that she had this morning ( does not currently have). Pts. Strengths are 5s in RUE and LUE, and 4s in the RUE and LUE

## 2024-12-17 NOTE — ED PROVIDER NOTES
Emergency Department Provider Note        History of Present Illness     History provided by: Patient  Limitations to History: None  External Records Reviewed with Brief Summary: Outpatient progress note from 5/10/24 which showed routine PCP visit    HPI:  Viki Diez is a 59 y.o. female with PMHx kidney transplant (DDRT 2015) on mycophenolate, htn, GERD, who presents to ED with LUE/LLE weakness x5 days and intermittent bifrontal headache. She does not recall what she was doing when she first noticed her left-sided weakness, but reports some discomfort in the muscles of the LUE/LLE.  Patient reports no changes in her speech, no facial asymmetry.  No chest pain, shortness of breath, palpitations, lightheadedness.  She does report chronic nausea, which is unchanged from baseline and improves with home Zofran.    Additionally, patient reports decreased PO fluid intake over the last several days and is concerned she may be dehydrated.  She wishes to have her creatinine checked.    Per patient, usually takes her mycophenolate and tacrolimus at 9 AM and 10 PM, did take her evening antirejection meds tonight.    No fever/chills, cough, congestion, vomiting, dysuria.    Physical Exam   Triage vitals:  T 36 °C (96.8 °F)  HR 79  /89  RR 18  O2 100 % None (Room air)    Physical exam:   Triage vitals reviewed.  Constitutional: Thin adult in no acute distress, non toxic in appearance  Head: Normocephalic, atraumatic  Skin: Intact, dry. No rashes or lesions.  Eyes: Pupils are equal, reactive to light bilaterally. EOMI without nystagmus. No conjunctival injection.  Neck: Supple. Trachea is midline.  Pulmonary: Normal work of breathing with no accessory muscle use noted.  Clear to auscultation bilaterally.   Cardiovascular: RRR. 2+ radial pulses bilaterally.   Abdomen: Soft, nondistended. Nontender to palpation.  Extremities: No gross deformities.  Moving all extremities spontaneously without difficulty.  Neuro: Awake  and alert, oriented x4. Answers all questions appropriately. Speech is clear. Face is symmetric without facial droop and facial sensation to light touch equal bilaterally. Uvula midline. Tongue protrusion midline. Hearing intact bilaterally. Full and equal shoulder shrug and head turn against resistance. 5/5 motor strength of RUE and RLE, 4/5 strength in LUE ( strength, elbow flexion/extension) and LLE (hip flexion, knee flexion/extension, dorsiflexion/plantarflexion). No drift in b/l UE or LE. Sensation to light touch intact in all four extremities. Finger to nose and heel to shin intact.     Medical Decision Making & ED Course   Medical Decision Makin y.o. female with PMHx kidney transplant (DDRT ) on mycophenolate, htn, GERD, who presents to ED with LUE/LLE weakness x5 days and intermittent bifrontal headache.  On arrival, patient was afebrile and hemodynamically stable, saturating well on room air.  On exam, she does have 4/5 strength in LUE/LLE, normal strength in RUE/RLE.  No facial droop, dysarthria, aphasia.  Patient reported that symptoms have been ongoing and constant for 5 days, so was not activated as a BAT.  Patient did report a bifrontal headache, and was given Tylenol and Reglan with significant improvement.    Differential diagnosis includes CVA, hemiplegic migraine, metabolic derangement.  No signs or symptoms of acute infection.  Time course is not consistent with TIA.    CBC unremarkable, CMP significant for Na 129, normal BUN and creatinine.  CT head obtained and showed no acute abnormalities, no evidence of mass, ICH, or infarct.  On reevaluation, patient's headache had improved significantly to 3/10.  No change in extremity strength.    Discussed with admissions coordinator, patient admitted for hyponatremia and further workup of unilateral weakness.    ----    Differential diagnoses considered include but are not limited to: CVA, hemiplegic migraine, metabolic derangement, acute  infection     Social Determinants of Health which Significantly Impact Care: None identified     EKG Independent Interpretation: EKG not obtained    Independent Result Review and Interpretation: Relevant laboratory and radiographic results were reviewed and independently interpreted by myself.  As necessary, they are commented on in the ED Course.    Chronic conditions affecting the patient's care: As documented above in [facility]    The patient was discussed with the following consultants/services: Admission Coordinator who accepted the patient for admission    Care Considerations: As documented above in [facility]    ED Course:  ED Course as of 12/17/24 0750 Tue Dec 17, 2024   0120 Creatinine: 0.76 [HH]      ED Course User Index  [HH] Becca Wyman MD         Diagnoses as of 12/17/24 0750   Hyponatremia   Left-sided weakness   Hx of kidney transplant ([facility])     Disposition   As a result of their workup, the patient will require admission to the hospital.  The patient was informed of her diagnosis.  The patient was given the opportunity to ask questions and I answered them. The patient agreed to be admitted to the hospital.      Patient seen and discussed with ED attending physician.    Becca Wyman MD  Emergency Medicine    Becca Wyman MD  Resident  12/17/24 0757  ----------------------------------------    This patient was seen by the resident physician. I have seen and examined the patient, agree with the workup, evaluation, management and diagnosis. The care plan has been discussed and I concur.    My assessment reveals the following:    HPI: Patient is a 60 y/o female with h/o 2nd kidney transplant in 2015 presenting with LUE and LLE weakness x 5 days. No h/o similar symptoms. Constant since onset. Reports bifrontal intermittent headache x 5 days as well. Possible vision changes. Family reported to her that she was slurring her speech. Has chronic nausea relieved by Zofran at home. No F/C. No CP/SOB/abd pain.  No diarrhea. No urinary complaints.     PE:  Vital signs reviewed in nursing triage note, EMR flowsheets, and at patient's bedside  GEN: Patient is awake, alert, calm, cooperative, and in mild neuro distress.  HEAD: Normocephalic and atraumatic.  EYES: Anicteric sclera. PERRL.   MOUTH: Mucous membranes moist.  CV: Regular rate and rhythm. (+) s1/s2. No murmurs/rubs/gallops.  PULM: CTAB. No wheezes, rales, or crackles.  GI: Soft, non-tender, non-distended without rebound or guarding.  EXT: No deformities noted. Full range of motion at all major joints. Non-tender.  NEURO: Moves all extremities. Sensation grossly intact throughout. 5/5 strength in RUE and RLE. 4/5 strength in LUE and LLE. No pronator drift. Smile symmetrical. Tongue extends midline. No slurred speech.   SKIN: Warm, dry. No erythema or ecchymosis.    Labs Reviewed   CBC WITH AUTO DIFFERENTIAL - Abnormal       Result Value    WBC 10.2      nRBC 0.0      RBC 4.11      Hemoglobin 12.6      Hematocrit 35.9 (*)     MCV 87      MCH 30.7      MCHC 35.1      RDW 12.9      Platelets 323      Neutrophils % 67.2      Immature Granulocytes %, Automated 0.3      Lymphocytes % 22.1      Monocytes % 7.9      Eosinophils % 1.9      Basophils % 0.6      Neutrophils Absolute 6.86      Immature Granulocytes Absolute, Automated 0.03      Lymphocytes Absolute 2.26      Monocytes Absolute 0.81      Eosinophils Absolute 0.19      Basophils Absolute 0.06     COMPREHENSIVE METABOLIC PANEL - Abnormal    Glucose 140 (*)     Sodium 129 (*)     Potassium 4.0      Chloride 98      Bicarbonate 22      Anion Gap 13      Urea Nitrogen 14      Creatinine 0.76      eGFR >90      Calcium 10.6      Albumin 4.3      Alkaline Phosphatase 100      Total Protein 7.8      AST 9      Bilirubin, Total 0.3      ALT 4 (*)    CBC WITH AUTO DIFFERENTIAL - Abnormal    WBC 8.8      nRBC 0.0      RBC 4.08      Hemoglobin 12.5      Hematocrit 35.2 (*)     MCV 86      MCH 30.6      MCHC 35.5      RDW  13.0      Platelets 325      Neutrophils % 53.7      Immature Granulocytes %, Automated 0.3      Lymphocytes % 32.8      Monocytes % 9.1      Eosinophils % 3.3      Basophils % 0.8      Neutrophils Absolute 4.70      Immature Granulocytes Absolute, Automated 0.03      Lymphocytes Absolute 2.87      Monocytes Absolute 0.80      Eosinophils Absolute 0.29      Basophils Absolute 0.07     MAGNESIUM - Abnormal    Magnesium 1.28 (*)    HEPATIC FUNCTION PANEL - Abnormal    Albumin 4.1      Bilirubin, Total 0.4      Bilirubin, Direct 0.1      Alkaline Phosphatase 101      ALT 4 (*)     AST 9      Total Protein 7.4     C-REACTIVE PROTEIN - Abnormal    C-Reactive Protein 5.62 (*)    BASIC METABOLIC PANEL - Abnormal    Glucose 125 (*)     Sodium 133 (*)     Potassium 4.0      Chloride 100      Bicarbonate 22      Anion Gap 15      Urea Nitrogen 14      Creatinine 0.72      eGFR >90      Calcium 10.3     OSMOLALITY - Abnormal    Osmolality, Serum 275 (*)    LIPID PANEL - Abnormal    Cholesterol 196      HDL-Cholesterol 54.3      Cholesterol/HDL Ratio 3.6      LDL Calculated 118 (*)     VLDL 24      Triglycerides 118      Non HDL Cholesterol 142     CBC WITH AUTO DIFFERENTIAL - Abnormal    WBC 11.3      nRBC 0.0      RBC 3.98 (*)     Hemoglobin 11.9 (*)     Hematocrit 34.9 (*)     MCV 88      MCH 29.9      MCHC 34.1      RDW 12.9      Platelets 322      Neutrophils % 70.4      Immature Granulocytes %, Automated 0.3      Lymphocytes % 15.9      Monocytes % 10.7      Eosinophils % 2.1      Basophils % 0.6      Neutrophils Absolute 7.94 (*)     Immature Granulocytes Absolute, Automated 0.03      Lymphocytes Absolute 1.79      Monocytes Absolute 1.21 (*)     Eosinophils Absolute 0.24      Basophils Absolute 0.07     RENAL FUNCTION PANEL - Abnormal    Glucose 118 (*)     Sodium 133 (*)     Potassium 4.4      Chloride 100      Bicarbonate 22      Anion Gap 15      Urea Nitrogen 16      Creatinine 1.10 (*)     eGFR 59 (*)     Calcium  10.0      Phosphorus 3.2      Albumin 4.0     MAGNESIUM - Abnormal    Magnesium 1.36 (*)    TACROLIMUS - Normal    Tacrolimus  6.5      Narrative:     NOTE: Result was obtained using a  chemiluminescent microparticle immunoassay  (CMIA) on the  i system.  Optimal therapeutic ranges for immunosuppressant  drugs depend upon an individual  patient's current clinical state, type of  organ transplant, time post-transplant,  co-administration of other immunosuppressants,  and other clinical factors. The results of  this test should be correlated with additional  clinical and laboratory data before changes  in treatment regimens are made.   COAGULATION SCREEN - Normal    Protime 11.5      INR 1.0      aPTT 34      Narrative:     The APTT is no longer used for monitoring Unfractionated Heparin Therapy. For monitoring Heparin Therapy, use the Heparin Assay.   PROCALCITONIN - Normal    Procalcitonin 0.03      Narrative:     Procalcitonin (PCT) results measured serially can  aid in decision-making for antibiotic discontinuation in  patients with suspected or confirmed sepsis in conjunction  with additional clinical information. Antibiotic  discontinuation may be considered with a change in PCT of  >80% from the peak result or when PCT falls below 0.50 ng/mL.    Procalcitonin results should not be used in isolation but  should be interpreted in conjunction with additional clinical  and laboratory findings. Procalcitonin results should not be  used to guide the initiation of antibiotic therapy.    Falsely low PCT values in the presence of bacterial infection  may occur in early infection, with atypical pathogens,  localized infections, and subacute infectious endocarditis.    Falsely elevated results outside of severe bacterial  infection/sepsis may be seen in patients with renal failure  or insufficiency, severe trauma or burns, recent major  abdominal/cardiac surgery, acute multi-organ failure, rarely  in patients  with medullary thyroid carcinoma and rare  neuroendocrine tumors, and non-specific interfering antibodies  (heterophile antibodies, rheumatoid factor, human anti-mouse  antibodies (HAMA), etc).    Performance of the PCT test in pediatric patients (<17yo),  pregnant women, immunocompromised patients, and patients on  immunomodulatory medications has not been evaluated.   PHOSPHORUS - Normal    Phosphorus 3.5     OSMOLALITY, URINE - Normal    Osmolality, Urine Random 356     TSH WITH REFLEX TO FREE T4 IF ABNORMAL - Normal    Thyroid Stimulating Hormone 1.02      Narrative:     TSH testing is performed using different testing methodology at [facility] than at other [facility]. Direct result comparisons should only be made within the same method.     CREATINE KINASE - Normal    Creatine Kinase 19     ELECTROLYTE PANEL, URINE    Sodium, Urine Random 36      Sodium/Creatinine Ratio 41      Potassium, Urine Random 42      Potassium/Creatinine Ratio 47      Chloride, Urine Random 43      Chloride/Creatinine Ratio 48      Creatinine, Urine Random 88.7     HEMOGLOBIN A1C    Hemoglobin A1C 5.2      Estimated Average Glucose 103      Narrative:     Diagnosis of Diabetes-Adults  Non-Diabetic: < or = 5.6%  Increased risk for developing diabetes: 5.7-6.4%  Diagnostic of diabetes: > or = 6.5%       CBC WITH AUTO DIFFERENTIAL   RENAL FUNCTION PANEL   MAGNESIUM   TACROLIMUS     Transthoracic Echo (TTE) Complete   Final Result      CT angio head and neck w and wo IV contrast   Final Result   CT head:        Loss of gray-white differentiation within the right paramedian fredo   consistent with acute to subacute infarction better evaluated on   same-day MRI brain.        CTA neck:        1. Coarse atherosclerotic calcifications located within the right   carotid bulb causes likely greater than 80% luminal narrowing,   according to NASCET criteria, noting that evaluation is somewhat   difficult due to presence of  coarse atherosclerotic calcifications   and can be further evaluated with MRA neck with intravenous contrast   as clinically indicated.        2. Marked narrowing versus focal occlusion of the proximal right   external carotid artery with reconstitution of the luminal caliber   distally.        3. Atherosclerotic calcifications located within the left carotid   bulb causing no striking luminal narrowing.        CTA head:        Likely developmental narrowing of the intradural vertebral arteries   and basilar artery. Otherwise, no striking narrowing of the   visualized arteries in the head.                  I personally reviewed the images/study and resident's interpretation   and I agree with the findings as stated by Juliana Arce MD (resident   radiologist). This study was analyzed and interpreted at [place], [place].             MACRO:   None.        Signed by: Clifford Herman 12/18/2024 7:21 AM   Dictation workstation:   [id number]      MR brain wo IV contrast   Final Result   1. Acute to early subacute infarction in the right paramedian fredo.   No mass effect or hemorrhagic transformation.   2. Moderate to severe chronic microvascular changes as above. Small   remote left cerebellar infarcts.        I personally reviewed the images/study and I agree with the findings   as stated by resident Ross Najera. This study was interpreted   at [place], [place].        MACRO:   Ross Najera discussed the significance and urgency of this   critical finding by epic secure chat with  ANA NORRIS on   12/17/2024 at 8:52 am.  (**-RCF-**) Findings:  See findings.        Signed by: Jean Leone 12/17/2024 11:24 AM   Dictation workstation:   [id number]      CT head wo IV contrast   Final Result   No acute intracranial abnormality. If symptoms persist or there is   high clinical suspicion further evaluation with MRI can be  considered.             I personally reviewed the images/study and I agree with the findings   as stated by Dr. Arturo Maciel. This study was interpreted at [facility], [place], [place].        MACRO:   None        Signed by: Ida Beck 12/17/2024 12:42 AM   Dictation workstation:   [id number]            Medical Decision Making:  - IV  - Labs  - CT head without contrast  - Admit for inpatient MRI    Differential Diagnoses Considered: CVA, TIA, Spinal issue    Chronic Medical Conditions Significantly Affecting Care: Second kidney transplant in 2015    Escalation of Care:  Appropriate for inpatient management    MD Saurabh Wright MD  12/18/24 1130

## 2024-12-17 NOTE — CONSULTS
"NEPHROLOGY NEW CONSULT NOTE   Viki Diez   59 y.o.    @WT@  MRN/Room: [id number]    Reason for consult: Kidney transplant patient    HPI:  Viki Diez is a 59 y.o. female with PMHx of HTN and obstructive uropathy/nephrolithiasis s/p kidney transplant (DDKT 2015) on mycophenolate and tacrolimus, PRUDENCIO, Depression. Admitted with acute ischemic stroke.       In The ER: BP (!) 166/108 (BP Location: Left arm, Patient Position: Lying)   Pulse 69   Temp 36 °C (96.8 °F) (Temporal)   Resp 18   Ht 1.702 m (5' 7\")   Wt 55.8 kg (123 lb)   SpO2 98%   BMI 19.26 kg/m²      Past Medical History:   Diagnosis Date    Calculus of kidney 07/27/2013    Nephrolithiasis    End stage renal disease (Multi) 07/27/2013    End stage renal disease    Essential (primary) hypertension 04/17/2015    HTN (hypertension), benign    Kidney transplant status 07/27/2013    Kidney replaced by transplant    Personal history of other endocrine, nutritional and metabolic disease 04/17/2015    History of hypercholesterolemia    Personal history of other mental and behavioral disorders     History of depression    Personal history of other specified conditions 03/31/2021    History of dysuria    Urinary tract infection, site not specified 02/05/2018    Acute UTI      Past Surgical History:   Procedure Laterality Date    HYSTEROSCOPY  03/05/2014    Hysteroscopy With Endometrial Ablation    OTHER SURGICAL HISTORY  03/05/2014    Blood Transfusion (___ Ml)    OTHER SURGICAL HISTORY  04/17/2015    Peritoneal Dialysis Catheter      Family History   Family history unknown: Yes     Social History     Socioeconomic History    Marital status:      Spouse name: Not on file    Number of children: Not on file    Years of education: Not on file    Highest education level: Not on file   Occupational History    Not on file   Tobacco Use    Smoking status: Former     Current packs/day: 0.00     Types: Cigarettes     Quit date: 6/27/2022     " Years since quittin.4    Smokeless tobacco: Never   Vaping Use    Vaping status: Never Used   Substance and Sexual Activity    Alcohol use: Never    Drug use: Never    Sexual activity: Defer   Other Topics Concern    Not on file   Social History Narrative    Not on file     Social Drivers of Health     Financial Resource Strain: Not on file   Food Insecurity: Not on file   Transportation Needs: Not on file   Physical Activity: Not on file   Stress: Not on file   Social Connections: Not on file   Intimate Partner Violence: Not on file   Housing Stability: Not on file       Allergies   Allergen Reactions    Ibuprofen Other     Can not take due to kidneys        (Not in a hospital admission)       Meds:   amLODIPine, 5 mg, Daily  aspirin, 81 mg, Daily  atorvastatin, 80 mg, Daily  clopidogrel, 75 mg, Daily  enoxaparin, 40 mg, Daily  FLUoxetine, 20 mg, Daily  FLUoxetine, 40 mg, Daily  iohexol, 90 mL, Once in imaging  lurasidone, 40 mg, Daily with lunch  mycophenolate, 1,000 mg, q12h  pantoprazole, 20 mg, Daily before breakfast  tacrolimus, 2 mg, q12h SHERICE  traZODone, 200 mg, Nightly         ondansetron ODT, 4 mg, q8h PRN  polyethylene glycol, 17 g, Daily PRN        Vitals:    24 1629   BP: (!) 166/108   Pulse: 69   Resp: 18   Temp:    SpO2: 98%        No intake/output data recorded.   Weight change:      Gen: well appearing, A+Ox3, in no acute distress  HEENT: sclera anicteric, no conjunctival injection, MMM  Resp: CTAB, normal breath sounds, no wheezing/crackles  CV: RRR, normal S1/S2, no murmurs  GI: non-tender, non-distended, no rebound or guarding  Extremities/MSK: warm and well perfused, no edema bilateral  Neuro: A+Ox3, deficits per neurology note  Skin: warm and dry, no lesions, no rashes      ASSESSMENT:  Viki Diez is a 59 y.o. female with PMHx of HTN and obstructive uropathy/nephrolithiasis s/p kidney transplant (DDKT ) on mycophenolate and tacrolimus, PRUDENCIO, Depression. Admitted with acute  ischemic stroke.     #DDKT  - DDKT 2015  - Immunosuppression: Cellcept 1 g BID, Tacrolimus 2 mg BID  - Baseline Cr 0.7 admitted with Cr at baseline.    #Acute ischemic stroke      Recommendations:  - Continue immunosuppressive medications  - Electrolyte repletion as appropriate  - Tacrolimus trough level.    Paris Duran MD  Nephrology Fellow  24 hour Renal Pager - [phone]    Discussed with attending nephrologist

## 2024-12-17 NOTE — CONSULTS
"    Subjective     HPI  Viki Diez is a 59 y.o. Right-handed female with PMH of HTN, renal transplant, PRUDENCIO, and depression who presented to [facility] on 2024 with 5 day history of L sided weakness, stroke consulted for \"L-sided weakness started 4-5 days ago.\"    Per chart review patient woke up on 24 with L sided weakness. She presented 5 days later to [facility] ED.    In the ED:  Vitals wnl; CBC wnl  Electrolytes notable for hyponatremia 129; she was given tylenol and relgan    Patient admitted to medicine for further workup.    MRI brain 24 personally reviewed. There is a DWI lesion of the R paramedian fredo with ADC correlate. GRE with some some blood products in R frontal lobe (puntate). FLAIR with confluent white matter disease,     Speaking to patient at bedside she reports that she had been in her usual state of health on the evening of 2024 when she attended her granddaughter's [employer] concert.  The performance went well and she went home and went to sleep.  The following morning on 2024 she awoke and nearly fell to the ground as she was weak on the left side of her body.  She was thinking this could be related to some dehydration and tried to drink water and rest in order to recover.  This persisted for the next few days and eventually the patient's daughter convinced her to seek medical attention.    She denies any preceding symptoms or warning signs.  She denies any headaches.  She denies any history of stroke.    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    Past Medical History  Past Medical History:   Diagnosis Date    Calculus of kidney 2013    Nephrolithiasis    End stage renal disease (Multi) 2013    End stage renal disease    Essential (primary) hypertension 2015    HTN (hypertension), benign    Kidney transplant status 2013    " "Kidney replaced by transplant    Personal history of other endocrine, nutritional and metabolic disease 2015    History of hypercholesterolemia    Personal history of other mental and behavioral disorders     History of depression    Personal history of other specified conditions 2021    History of dysuria    Urinary tract infection, site not specified 2018    Acute UTI     Surgical History  Past Surgical History:   Procedure Laterality Date    HYSTEROSCOPY  2014    Hysteroscopy With Endometrial Ablation    OTHER SURGICAL HISTORY  2014    Blood Transfusion (___ Ml)    OTHER SURGICAL HISTORY  2015    Peritoneal Dialysis Catheter     Social History  Social History     Tobacco Use    Smoking status: Former     Current packs/day: 0.00     Types: Cigarettes     Quit date: 2022     Years since quittin.4    Smokeless tobacco: Never   Vaping Use    Vaping status: Never Used   Substance Use Topics    Alcohol use: Never    Drug use: Never     Allergies  Ibuprofen    Home Medications  Current Outpatient Medications   Medication Instructions    amLODIPine (NORVASC) 5 mg, oral, Daily    FLUoxetine (PROZAC) 20 mg, oral, Daily, TAKE ONE CAPSULE BY MOUTH ONCE DAILY. TAKE WITH 40 MG FOR A TOTAL OF 60 MG    FLUoxetine (PROZAC) 40 mg, oral, Daily    lurasidone (LATUDA) 40 mg, oral, Daily with lunch    magnesium oxide (Mag-Ox) 400 mg tablet Take 1 tablet (400 mg) by mouth 2 times a day.    mycophenolate (CELLCEPT) 1,000 mg, oral, Every 12 hours    omeprazole (PRILOSEC) 20 mg, oral, Daily before breakfast    ondansetron ODT (ZOFRAN-ODT) 4 mg, oral, Every 8 hours PRN    tacrolimus (PROGRAF) 2 mg, oral, Every 12 hours    traZODone (DESYREL) 200 mg, oral, Nightly           Objective   24h Vitals  Heart Rate:  [56-86]   Temperature:  [36 °C (96.8 °F)]   Respirations:  [16-18]   BP: (130-155)/(72-90)   Height:  [170.2 cm (5' 7\")]   Weight:  [55.8 kg (123 lb)]   Pulse Ox:  [98 %-100 %]  "     Physical Exam  Neurological Exam  Physical Exam    Physical Exam:  General: Middle-aged  woman no acute distress  Heart: Regular rate and rhythm no murmurs rubs or gallops  Lungs: CTAB  Abdomen: Flat nontender to superficial palpation      Neurological Exam:  MENTAL STATUS:    Orientation: Recognizes writer as neurologist and gives robust medical history no she is on the hospital and understands that she has had a stroke  Language: Expression, repetition, naming, comprehension intact  Follows complex commands across midline  Thought processes: Logical, organized    CRANIAL NERVES:  - II/III: PERRL  - II:  Visual fields intact to confrontation bilaterally tested individually and together  - III, IV, VI: EOM full to pursuit without nystagmus  - V: V1-V3 sensation intact bilaterally  - VII: Face muscles symmetric with smile and eye closure  - VIII: Intact to interview  - IX, X: Palate elevated symmetrically bilaterally, no hoarseness  - XI: 5/5 strength on shoulder shrugging bilaterally  - XII: Tongue midline without atrophy or fasciculation    MOTOR: Tone and bulk normal in all extremities. No tremor, no abnormal movements.    STRENGTH: R L  Deltoid  5 4  Biceps  5 4  Triceps  5 4    5 5  Hip flexion 5 4+  Quadriceps 5 4+  Hamstrings 5 4+  DorsiFlex 5 5-  PlantarFlex 5 5-    REFLEXES: R L  Biceps  +2 +2  Triceps  +2 +2  Brachioradialis +2 +2  Patellar  +2 +2  Achilles +2 +2  Plantar  Down Down    COORDINATION: Intact on finger to nose bl, intact on heel to shin bl  SENSORY: Intact to light touch in bl UE and LE  ROMBERG: Negative  GAIT: Slightly dragging LLE with otherwise stable gait    NIHSS    1a  Level of consciousness: 0=alert; keenly responsive   1b. LOC questions:  0=Performs both tasks correctly   1c. LOC commands: 0=Performs both tasks correctly   2.  Best Gaze: 0=normal   3. Visual: 0=No visual loss   4. Facial Palsy: 1=Minor paralysis (flattened nasolabial fold, asymmetric on smiling)   5a.  "Motor Left Arm: 0=No drift, limb holds 90 (or 45) degrees for full 10 seconds   5b.  Motor Right Arm: 0=No drift, limb holds 90 (or 45) degrees for full 10 seconds   6a. Motor Left Le=No drift, limb holds 90 (or 45) degrees for full 10 seconds   6b  Motor Right Le=No drift, limb holds 90 (or 45) degrees for full 10 seconds   7. Limb Ataxia: 0=Absent   8.  Sensory: 0=Normal; no sensory loss   9. Best Language:  0=No aphasia, normal   10. Dysarthria: 0=Normal   11. Extinction and Inattention: 0=No abnormality          Total:   1        Recent Labs  Results from last 72 hours   Lab Units 24  0539 24  0019   SODIUM mmol/L 133* 129*   POTASSIUM mmol/L 4.0 4.0   BUN mg/dL 14 14   CREATININE mg/dL 0.72 0.76   CALCIUM mg/dL 10.3 10.6   MAGNESIUM mg/dL 1.28*  --       Results from last 72 hours   Lab Units 24  0539 24  0019   WBC AUTO x10*3/uL 8.8 10.2   HEMOGLOBIN g/dL 12.5 12.6   HEMATOCRIT % 35.2* 35.9*   PLATELETS AUTO x10*3/uL 325 323      Results from last 72 hours   Lab Units 24  0539   INR  1.0       Lab Results   Component Value Date    HGBA1C 5.2 2024    HGBA1C 5.9 (A) 2022    HGBA1C 6.1 (A) 2021    LDLF 79 2023    LDLF 91 2022    LDLF 102 (H) 2020          Results from last 7 days   Lab Units 24  0019   HEMOGLOBIN A1C % 5.2     No results found for: \"BNP\"    Imaging  MRI head results: MR brain wo IV contrast    Result Date: 2024  1. Acute to early subacute infarction in the right paramedian fredo. No mass effect or hemorrhagic transformation. 2. Moderate to severe chronic microvascular changes as above. Small remote left cerebellar infarcts.   I personally reviewed the images/study and I agree with the findings as stated by resident Ross Najera. This study was interpreted at [place], [place].   MACRO: Ross Najera discussed the significance and urgency of this critical " "finding by epic secure chat with  ANA NORRIS on 12/17/2024 at 8:52 am.  (**-RCF-**) Findings:  See findings.   Signed by: Jean Leone 12/17/2024 11:24 AM Dictation workstation:   [id number]   CT head results: CT head wo IV contrast    Result Date: 12/17/2024  No acute intracranial abnormality. If symptoms persist or there is high clinical suspicion further evaluation with MRI can be considered.     I personally reviewed the images/study and I agree with the findings as stated by Dr. Arturo Maciel. This study was interpreted at [place], [place].   MACRO: None   Signed by: Ida Beck 12/17/2024 12:42 AM Dictation workstation:   [id number]   CT brain angio imaging results: [unfilled]  Echocardiography results: No echocardiogram results found for the past 12 months              IV Thrombolysis IV Thrombolysis Checklist      IV Thrombolysis Given: No; Thrombolysis contraindication reason: Time from Last Known Well (or stroke onset) is >4.5 hours       Assessment/Plan   Viki Diez is a 59 y.o. Right-handed female with PMH of HTN, renal transplant, PRUDENCIO, and depression who [facility]16[facility]24 with 5 day history of L sided weakness, stroke consulted for \"L-sided weakness started 4-5 days ago.\"    Clinical history and exam and neurologic imaging is consistent with a pure motor stroke due to a small vessel disease causing acute ischemic infarct of the right paramedian fredo.  Will start dual antiplatelet therapy and have patient follow-up in stroke clinic for optimization.    Type: Cerebral Infarction  Subtype: Small-vessel occlusion  Vessels Involved: Pontine Perforators  Neurologic Manifestations: Hemiparesis  Deficits: NIHSS   Initial treatment: None  Anti-platelets or Anti-coagulation management: Aspirin and Plavix  Vascular Risk Factors: HTN and HLD  BP goal: Normotension  Disposition: Pend PT/OT eval     Recommendations:    Stroke " workup  CTA head and neck pending    TTE w/ bubble study pending  , A1c: 5.2 %  Per [facility] protocol- DAPT (ASA & Plavix) for 21 days then Plavix monotherapy indefinitely   Atorvastatin 80mg  sBP goal Normotension  NPO until nurse bedside swallow assessment   Consider focused stroke order set     Vascular Risk Factor modification goals:  Blood pressure goals: avoid hypotension SBP <100 that could worsen cerebral perfusion,  Normotension  Lipid Goals: education on healthy diet and statin therapy to maintain or achieve goal LDL-cholesterol < 70mg  Glucose Goals: early treatment of hyperglycemia to goal glucose 140-180 mg/dl with long-term goal A1c < 7%   Smoking Cessation and Education  Assessment for Rehabilitation needs including PT/OT and if indicated swallow evaluation and speech therapy   Patient and family education on signs and symptoms of stroke, calling 911, risk factors, and healthy strategies for stroke prevention.      Dang Guidry MD  PGY-4 Neurology  Stroke [phone]    Recommendations are not final until formally staffed with the attending physician, Dr. Dash.

## 2024-12-18 ENCOUNTER — APPOINTMENT (OUTPATIENT)
Dept: CARDIOLOGY | Facility: HOSPITAL | Age: 59
End: 2024-12-18
Payer: MEDICARE

## 2024-12-18 LAB
ALBUMIN SERPL BCP-MCNC: 4 G/DL (ref 3.4–5)
ANION GAP SERPL CALC-SCNC: 15 MMOL/L (ref 10–20)
AORTIC VALVE PEAK VELOCITY: 1.64 M/S
ATRIAL RATE: 74 BPM
AV PEAK GRADIENT: 11 MMHG
AVA (PEAK VEL): 2.16 CM2
BASOPHILS # BLD AUTO: 0.07 X10*3/UL (ref 0–0.1)
BASOPHILS NFR BLD AUTO: 0.6 %
BUN SERPL-MCNC: 16 MG/DL (ref 6–23)
CALCIUM SERPL-MCNC: 10 MG/DL (ref 8.6–10.6)
CHLORIDE SERPL-SCNC: 100 MMOL/L (ref 98–107)
CO2 SERPL-SCNC: 22 MMOL/L (ref 21–32)
CREAT SERPL-MCNC: 1.1 MG/DL (ref 0.5–1.05)
EGFRCR SERPLBLD CKD-EPI 2021: 59 ML/MIN/1.73M*2
EJECTION FRACTION APICAL 4 CHAMBER: 79.8
EJECTION FRACTION: 73 %
EOSINOPHIL # BLD AUTO: 0.24 X10*3/UL (ref 0–0.7)
EOSINOPHIL NFR BLD AUTO: 2.1 %
ERYTHROCYTE [DISTWIDTH] IN BLOOD BY AUTOMATED COUNT: 12.9 % (ref 11.5–14.5)
GLUCOSE SERPL-MCNC: 118 MG/DL (ref 74–99)
HCT VFR BLD AUTO: 34.9 % (ref 36–46)
HGB BLD-MCNC: 11.9 G/DL (ref 12–16)
IMM GRANULOCYTES # BLD AUTO: 0.03 X10*3/UL (ref 0–0.7)
IMM GRANULOCYTES NFR BLD AUTO: 0.3 % (ref 0–0.9)
LEFT ATRIUM VOLUME AREA LENGTH INDEX BSA: 16.6 ML/M2
LEFT VENTRICLE INTERNAL DIMENSION DIASTOLE: 4.1 CM (ref 3.5–6)
LEFT VENTRICULAR OUTFLOW TRACT DIAMETER: 1.9 CM
LYMPHOCYTES # BLD AUTO: 1.79 X10*3/UL (ref 1.2–4.8)
LYMPHOCYTES NFR BLD AUTO: 15.9 %
MAGNESIUM SERPL-MCNC: 1.36 MG/DL (ref 1.6–2.4)
MCH RBC QN AUTO: 29.9 PG (ref 26–34)
MCHC RBC AUTO-ENTMCNC: 34.1 G/DL (ref 32–36)
MCV RBC AUTO: 88 FL (ref 80–100)
MITRAL VALVE E/A RATIO: 0.77
MONOCYTES # BLD AUTO: 1.21 X10*3/UL (ref 0.1–1)
MONOCYTES NFR BLD AUTO: 10.7 %
NEUTROPHILS # BLD AUTO: 7.94 X10*3/UL (ref 1.2–7.7)
NEUTROPHILS NFR BLD AUTO: 70.4 %
NRBC BLD-RTO: 0 /100 WBCS (ref 0–0)
P AXIS: 47 DEGREES
P OFFSET: 179 MS
P ONSET: 124 MS
PHOSPHATE SERPL-MCNC: 3.2 MG/DL (ref 2.5–4.9)
PLATELET # BLD AUTO: 322 X10*3/UL (ref 150–450)
POTASSIUM SERPL-SCNC: 4.4 MMOL/L (ref 3.5–5.3)
PR INTERVAL: 190 MS
Q ONSET: 219 MS
QRS COUNT: 13 BEATS
QRS DURATION: 84 MS
QT INTERVAL: 374 MS
QTC CALCULATION(BAZETT): 415 MS
QTC FREDERICIA: 401 MS
R AXIS: 59 DEGREES
RBC # BLD AUTO: 3.98 X10*6/UL (ref 4–5.2)
RIGHT VENTRICLE FREE WALL PEAK S': 11.1 CM/S
RIGHT VENTRICLE PEAK SYSTOLIC PRESSURE: 21 MMHG
SODIUM SERPL-SCNC: 133 MMOL/L (ref 136–145)
T AXIS: 23 DEGREES
T OFFSET: 406 MS
TRICUSPID ANNULAR PLANE SYSTOLIC EXCURSION: 1.6 CM
VENTRICULAR RATE: 74 BPM
WBC # BLD AUTO: 11.3 X10*3/UL (ref 4.4–11.3)

## 2024-12-18 PROCEDURE — 2500000004 HC RX 250 GENERAL PHARMACY W/ HCPCS (ALT 636 FOR OP/ED)

## 2024-12-18 PROCEDURE — 96375 TX/PRO/DX INJ NEW DRUG ADDON: CPT

## 2024-12-18 PROCEDURE — 93306 TTE W/DOPPLER COMPLETE: CPT | Performed by: INTERNAL MEDICINE

## 2024-12-18 PROCEDURE — 97161 PT EVAL LOW COMPLEX 20 MIN: CPT | Mod: GP

## 2024-12-18 PROCEDURE — 2500000001 HC RX 250 WO HCPCS SELF ADMINISTERED DRUGS (ALT 637 FOR MEDICARE OP)

## 2024-12-18 PROCEDURE — 96372 THER/PROPH/DIAG INJ SC/IM: CPT

## 2024-12-18 PROCEDURE — 2500000002 HC RX 250 W HCPCS SELF ADMINISTERED DRUGS (ALT 637 FOR MEDICARE OP, ALT 636 FOR OP/ED)

## 2024-12-18 PROCEDURE — 1210000001 HC SEMI-PRIVATE ROOM DAILY

## 2024-12-18 PROCEDURE — 85025 COMPLETE CBC W/AUTO DIFF WBC: CPT

## 2024-12-18 PROCEDURE — 99233 SBSQ HOSP IP/OBS HIGH 50: CPT | Performed by: HOSPITALIST

## 2024-12-18 PROCEDURE — 83735 ASSAY OF MAGNESIUM: CPT

## 2024-12-18 PROCEDURE — 80069 RENAL FUNCTION PANEL: CPT

## 2024-12-18 PROCEDURE — 93306 TTE W/DOPPLER COMPLETE: CPT

## 2024-12-18 PROCEDURE — 99233 SBSQ HOSP IP/OBS HIGH 50: CPT

## 2024-12-18 PROCEDURE — 36415 COLL VENOUS BLD VENIPUNCTURE: CPT

## 2024-12-18 RX ORDER — LANOLIN ALCOHOL/MO/W.PET/CERES
400 CREAM (GRAM) TOPICAL 2 TIMES DAILY
Status: DISCONTINUED | OUTPATIENT
Start: 2024-12-18 | End: 2024-12-19 | Stop reason: HOSPADM

## 2024-12-18 RX ORDER — NAPROXEN SODIUM 220 MG/1
81 TABLET, FILM COATED ORAL DAILY
Qty: 90 TABLET | Refills: 3 | Status: SHIPPED | OUTPATIENT
Start: 2024-12-19

## 2024-12-18 RX ORDER — PROCHLORPERAZINE MALEATE 10 MG
10 TABLET ORAL EVERY 6 HOURS PRN
Status: DISCONTINUED | OUTPATIENT
Start: 2024-12-18 | End: 2024-12-19

## 2024-12-18 RX ORDER — FLUOXETINE HYDROCHLORIDE 20 MG/1
20 CAPSULE ORAL DAILY
Start: 2024-12-18

## 2024-12-18 RX ORDER — ACETAMINOPHEN 500 MG
5 TABLET ORAL NIGHTLY PRN
Status: DISCONTINUED | OUTPATIENT
Start: 2024-12-18 | End: 2024-12-19 | Stop reason: HOSPADM

## 2024-12-18 RX ORDER — LANOLIN ALCOHOL/MO/W.PET/CERES
1 CREAM (GRAM) TOPICAL 2 TIMES DAILY
Qty: 180 TABLET | Refills: 3 | Status: SHIPPED | OUTPATIENT
Start: 2024-12-18

## 2024-12-18 RX ORDER — PROCHLORPERAZINE EDISYLATE 5 MG/ML
10 INJECTION INTRAMUSCULAR; INTRAVENOUS EVERY 6 HOURS PRN
Status: DISCONTINUED | OUTPATIENT
Start: 2024-12-18 | End: 2024-12-19

## 2024-12-18 RX ORDER — ATORVASTATIN CALCIUM 80 MG/1
80 TABLET, FILM COATED ORAL DAILY
Qty: 90 TABLET | Refills: 3 | Status: SHIPPED | OUTPATIENT
Start: 2024-12-19

## 2024-12-18 RX ORDER — CLOPIDOGREL BISULFATE 75 MG/1
75 TABLET ORAL DAILY
Qty: 18 TABLET | Refills: 0 | Status: SHIPPED | OUTPATIENT
Start: 2024-12-19

## 2024-12-18 RX ORDER — ACETAMINOPHEN 325 MG/1
650 TABLET ORAL EVERY 6 HOURS PRN
Status: DISCONTINUED | OUTPATIENT
Start: 2024-12-18 | End: 2024-12-19

## 2024-12-18 RX ORDER — MAGNESIUM SULFATE HEPTAHYDRATE 40 MG/ML
4 INJECTION, SOLUTION INTRAVENOUS ONCE
Status: COMPLETED | OUTPATIENT
Start: 2024-12-18 | End: 2024-12-18

## 2024-12-18 RX ORDER — FLUOXETINE HYDROCHLORIDE 40 MG/1
40 CAPSULE ORAL DAILY
Start: 2024-12-18

## 2024-12-18 ASSESSMENT — COGNITIVE AND FUNCTIONAL STATUS - GENERAL
TURNING FROM BACK TO SIDE WHILE IN FLAT BAD: A LITTLE
PATIENT BASELINE BEDBOUND: NO
HELP NEEDED FOR BATHING: A LITTLE
WALKING IN HOSPITAL ROOM: A LITTLE
MOVING FROM LYING ON BACK TO SITTING ON SIDE OF FLAT BED WITH BEDRAILS: A LITTLE
DAILY ACTIVITIY SCORE: 18
TURNING FROM BACK TO SIDE WHILE IN FLAT BAD: A LITTLE
MOVING TO AND FROM BED TO CHAIR: A LITTLE
EATING MEALS: A LITTLE
DRESSING REGULAR LOWER BODY CLOTHING: A LITTLE
MOBILITY SCORE: 19
DRESSING REGULAR UPPER BODY CLOTHING: A LITTLE
PERSONAL GROOMING: A LITTLE
MOBILITY SCORE: 18
CLIMB 3 TO 5 STEPS WITH RAILING: A LITTLE
STANDING UP FROM CHAIR USING ARMS: A LITTLE
WALKING IN HOSPITAL ROOM: A LITTLE
CLIMB 3 TO 5 STEPS WITH RAILING: A LITTLE
TOILETING: A LITTLE
STANDING UP FROM CHAIR USING ARMS: A LITTLE
MOVING TO AND FROM BED TO CHAIR: A LITTLE

## 2024-12-18 ASSESSMENT — ACTIVITIES OF DAILY LIVING (ADL)
PATIENT'S MEMORY ADEQUATE TO SAFELY COMPLETE DAILY ACTIVITIES?: YES
FEEDING YOURSELF: INDEPENDENT
DRESSING YOURSELF: INDEPENDENT
WALKS IN HOME: INDEPENDENT
LACK_OF_TRANSPORTATION: NO
GROOMING: INDEPENDENT
TOILETING: INDEPENDENT
ADEQUATE_TO_COMPLETE_ADL: YES
LACK_OF_TRANSPORTATION: NO
BATHING: INDEPENDENT
HEARING - RIGHT EAR: FUNCTIONAL
ADL_ASSISTANCE: INDEPENDENT
JUDGMENT_ADEQUATE_SAFELY_COMPLETE_DAILY_ACTIVITIES: YES
HEARING - LEFT EAR: FUNCTIONAL

## 2024-12-18 ASSESSMENT — PAIN SCALES - GENERAL: PAINLEVEL_OUTOF10: 10 - WORST POSSIBLE PAIN

## 2024-12-18 NOTE — PROGRESS NOTES
Patient:   NIKKI KEENE            MRN: [id number]            FIN: [id number]              Age:   40 years     Sex:  MALE     :  [birth year]   Associated Diagnoses:   None   Author:   DANIEL SUAREZ     Basic Information   History source:  Patient, Family, RN, EHR .      History of Present Illness   NOTES The patient was seen resting in bed with mother, father, and sister at bedside.  He states that he has had a BM today. Brown color, not melena or BRBPR  No new questions or concerns.       Physical Examination   VS/Measurements   Vital Signs   19 08:07 CDT Temperature - VS 36.6 deg_C  Normal    Temperature Source - VS Temporal    Heart/Pulse Rate 60  Normal    Pulse Source Monitor    Respiration Rate 16 breaths/min  Normal    SpO2 98 %  Normal    NIBP Systolic 149  HI    NIBP Diastolic 89  Normal    NIBP Source Left Arm    NIBP   HI    SN Alarms Set and Appropriate Patient Alarms Set And Appropriate For Patient       General:  Alert and oriented, No acute distress.    Eye:  Extraocular movements are intact.    Neck:  Supple.    Respiratory:  Respirations are non-labored, Diminished breath sounds.   Cardiovascular:  Normal rate, Regular rhythm.    Gastrointestinal:  Soft, Non-tender, Non-distended, Normal bowel sounds.   Integumentary:  Warm, Dry, pale.    Neurologic:  Alert.    Cognition and Speech:  Oriented.    Psychiatric:  Cooperative.      Review / Management   Laboratory results:     Labs between:  2019 11:20 to 2019 11:20  CBC:                 WBC  HgB  Hct  Plt  MCV  RDW   2019 5.4  (L) 8.0  (L) 24.3  (L) 84  88.7  (H) 17.0   DIFF:                 Seg  Neutroph//ABS  Lymph//ABS  Mono//ABS  EOS/ABS  2019 NOT APPLICABLE  66 // 3.5 25 // 1.4 7 // 0.4 2 // 0.1  BMP:                 Na  Cl  BUN  Glu   2019 138  (H) 109  9  98                              K  CO2  Cr  Ca                              3.9  23  0.81  (L) 7.2   CMP:                 AST  ALT   Physical Therapy    Physical Therapy Evaluation    Patient Name: Viki Diez  MRN: [id number]  Department:   Room: [place] [address]  Today's Date: 12/18/2024   Time Calculation  Start Time: 0854  Stop Time: 0910  Time Calculation (min): 16 min    Assessment/Plan   PT Assessment  PT Assessment Results: Decreased strength, Decreased range of motion, Decreased endurance, Impaired balance, Decreased mobility  Rehab Prognosis: Good  Barriers to Discharge Home: No anticipated barriers  Evaluation/Treatment Tolerance: Patient tolerated treatment well  Medical Staff Made Aware: Yes  End of Session Communication: Bedside nurse  Assessment Comment: Pt currently requiring Johnny for supine to sit, otherwise </= SUP for all mobility. Pt reports no conerns for DC home. Pt would benefit from low intensity OP PT when medically stable for DC to progress towards goals and prior level of independence. Will continue to follow while in house.  End of Session Patient Position: Bed, 1 rail up (seated at EOB eating breakfast, RN aware of positioning)  IP OR SWING BED PT PLAN  Inpatient or Swing Bed: Inpatient  PT Plan  Treatment/Interventions: Bed mobility, Transfer training, Gait training, Stair training, Balance training, Neuromuscular re-education, Strengthening, Endurance training, Range of motion, Therapeutic exercise, Therapeutic activity, Home exercise program, Positioning, Postural re-education  PT Plan: Ongoing PT  PT Frequency: 3 times per week  PT Discharge Recommendations: Low intensity level of continued care  PT Recommended Transfer Status: Stand by assist  PT - OK to Discharge: Yes (when medically stable for DC)    Subjective   General Visit Information:  General  Reason for Referral: acute ischemic stroke  Past Medical History Relevant to Rehab: HTN, obstructive uropathy/nephrolithias s/p DDKT in 2015, PRUDENCIO, depression  Family/Caregiver Present: No  Prior to Session Communication: Bedside nurse  Patient Position Received:  "Bed, 2 rail up, Alarm off, not on at start of session  General Comment: Pt supine in bed upon PT arrival, on tele and IV magnesium, on room air. Agreeable to PT evaluation.  Home Living:  Home Living  Type of Home: Apartment  Lives With: Spouse  Home Adaptive Equipment: Cane  Home Layout: One level  Home Access: Level entry  Bathroom Shower/Tub: Tub/shower unit  Prior Level of Function:  Prior Function Per Pt/Caregiver Report  Level of [place]: Independent with ADLs and functional transfers, Independent with homemaking with ambulation  Receives Help From:  (reports  is in slow time of work (is a ) and can assist if needed; also has family that lives nearby)  ADL Assistance: Independent  Homemaking Assistance: Independent  Ambulatory Assistance: Independent  Vocational:  ((-)work)  Leisure: enjoys riding horses and taking care of barn  Hand Dominance: Ambidextrous  Prior Function Comments: no other falls, (+)drive; reports since onset of symptoms has been ambulating shorter distances and using HHA with   Precautions:  Precautions  Hearing/Visual Limitations: (+)glasses 24/7, not donned during session; reports needing new glasses and reports initial onset of L sided weakness reported having \"dizzy vision\" but resolved  Medical Precautions: Fall precautions     Vital Signs (Past 2hrs)        Date/Time Vitals Session Patient Position Pulse Resp SpO2 BP MAP (mmHg)     Pre PT Lying 85 --  96 140/86 --      During  Seated 110  96 124/87 --      Post Seated 107 --  97  --                         Objective   Pain:  Pain Assessment  0-10 (Numeric) Pain Score:  (does not rate, reports having headache, RN notified, pain does not limit session)  Cognition: WFL, oriented x4.    General Assessments:    Sensation  Light Touch: No apparent deficits (pt denies numbness/tingling; able to sense light touch sensation when assessed)       Coordination  Alternating Toe Taps: Intact  Coordination Comment: able " AlkPhos  Bili  Albumin   18-JUL-2019 (H) 109  (H) 106  91  (H) 1.4  (L) 2.4   Other Chem:             Mg  Phos  Triglycerides  GGTP  DirectBili                           2.0  2.8         COAG:                 INR  PT  PTT  Ddimer  Fibrinogen    18-JUL-2019 1.5  (H) 15.4                        .      Impression and Plan   Dx and Plan   Dx and Plan:  Diagnosis     ASSESSMENT AND PLAN:  Coffee ground emesis 2/2 grade II esophageal varices vs portal hypertensive gastropathy s/p upper GI endoscopy on 07/16  The patientt tolerated the procedure well with no acute complications and no estimated blood loss  Procedure indicated grade II esophageal varices, portal hypertensive gastropathy, three gastric polyps  Pt has history of esophageal varices 2/2 alcohol abuse  CT A/P (07/15) noted cirrhotic morphology of the liver, generalized mesenteric induration predominating at the root of the small bowel where there are small mesenteric lymph nodes, borderline distension of mid small bowel loops without a convincing transition point, small hiatal hernia  Hgb 7.8-> 8.0 (07/18), s/p 2U pRBCs (07/15) and 1U (07/16)   - IV protonix drip => oral PPI per GI    - Continue IV ocreotide total 72 hours (until tonight)    - Continue prophylactic IV ceftriaxone => can be changed to Cipro 500mg BID for total 7 days (including days of IV ceftriaxone); after this, patient can be on daily CIpro for SBP prophylaxis (since he also has ascites per GI)   - Nutrition: soft GI fiber   - GI following   Acute blood loss anemia with likely chronic anemia due to upper GIB  s/p 2U pRBCs (07/15) and 1U (07/16)  Hgb stable    - GI following  Alcohol intoxication in patient with history of alcohol abuse  Alcohol serum 157 , last intake upon 07/14/2019  UDS neg  No evidence of withdrawal   - Monitoring on CIWA protocol w/ PRN ativan   - Continue vitamin supplementation  Alcoholic hepatitis with liver cirrhosis and elevated ammonia   As noted on CT A/P above  US  Liver (07/18) noted findings of cirrhosis with mild ascites, no focal hypo or hyerpchoic liver lesion. Layering gallbladder sludge. Turbulent flow in the portal vein at the xi hepatis partially visualized, this suggestis portal hypertension.  Ammonia 98 -> 100 (07/17) (likely also related to acute UGIB)   Hepatitis panel negative  ceruloplasmin 27.5  Check smooth muscle antibody, anti mitochondira, and alpha 1 antirypsin phenotype  , , Alk Phos WNL, Tbili 1.4 (07/18)   - Continue lactulose 15mL TID   - Holding spironolactone  Thrombocytopenia, possibly due to liver cirrhosis vs. GI bleed vs both  Plts 81 -> 84 (07/18)   - Monitor and trend as indicated  Hypoalbuminia - Alb 2.5 -> 2.4 (07/18)  Acute kidney injury, resolved - Cr WNL (07/17)  Latic acidosis, resolved - LA WNL (07/17)  Hypokalemia, resolved - K WNL (07/17). s/p repletion  CODE STATUS:   Full code  PROPHYLAXIS:   SCDs, no AC due to GI bleed  DISPOSITION:   Pt was transferred out of ICU yesterday.  Anticipate possible d/c tomorrow  PCP:   None  All patient and family questions answered  All labs and imaging reviewed  Charting performed by alem Galvan for Dr. Corcoran     .     All medical record entries made by the alem were at my direction. I have reviewed the chart  and agree that the record accurately reflects my personal performance of the history, physical  exam, hospital course, and assessment and plan..     .   to perform finger opposition bilaterally    Static Sitting Balance  Static Sitting-Balance Support: Bilateral upper extremity supported, Feet supported  Static Sitting-Level of Assistance: Distant supervision  Static Sitting-Comment/Number of Minutes: x5 minutes; progressed to IND during session and pt left seated at EOB eating breakfast at end of session; RN aware    Static Standing Balance  Static Standing-Balance Support: Right upper extremity supported  Static Standing-Level of Assistance: Close supervision  Static Standing-Comment/Number of Minutes: x1 minute  Functional Assessments:  Bed Mobility  Bed Mobility: Yes  Bed Mobility 1  Bed Mobility 1: Supine to sitting  Level of Assistance 1: Minimum assistance  Bed Mobility Comments 1: HOB elevated, assist with RUE; cues for sequencing    Transfers  Transfer: Yes  Transfer 1  Transfer From 1: Sit to, Stand to  Transfer to 1: Sit, Stand  Technique 1: Sit to stand, Stand to sit  Transfer Device 1:  (no AD)  Transfer Level of Assistance 1: Close supervision  Trials/Comments 1: cues for UE/LE placement, waiting for room set up  Transfers 2  Transfer From 2: Toilet to  Transfer to 2: Sit, Stand  Technique 2: Sit to stand, Stand to sit  Transfer Device 2:  (no AD)  Transfer Level of Assistance 2: Distant supervision  Trials/Comments 2: cues for UE/LE placement    Ambulation/Gait Training  Ambulation/Gait Training Performed: Yes  Ambulation/Gait Training 1  Surface 1: Level tile  Device 1: No device (pushed IV pole)  Assistance 1: Close supervision  Quality of Gait 1: Narrow base of support (flexed trunk posture, decreased ifeoma)  Comments/Distance (ft) 1: 2x25ft; cues to verbalize signs/symptoms    Stairs  Stairs: No  Extremity/Trunk Assessments:  RUE   RUE : Within Functional Limits  LUE   LUE: Exceptions to WFL  LUE Strength  L Shoulder Flexion: 4-/5  RLE   RLE : Exceptions to WFL  Strength RLE  R Hip Flexion: 5/5  R Knee Extension: 5/5  R Ankle Dorsiflexion:  5/5  LLE   LLE : Exceptions to WFL  Strength LLE  L Hip Flexion: 4-/5  L Knee Extension: 4/5  L Ankle Dorsiflexion: 4/5  Outcome Measures:  [facility] Basic Mobility  Turning from your back to your side while in a flat bed without using bedrails: None  Moving from lying on your back to sitting on the side of a flat bed without using bedrails: A little  Moving to and from bed to chair (including a wheelchair): A little  Standing up from a chair using your arms (e.g. wheelchair or bedside chair): A little  To walk in hospital room: A little  Climbing 3-5 steps with railing: A little  Basic Mobility - Total Score: 19    Encounter Problems       Encounter Problems (Active)       PT Problem       Pt will be IND with supine<>sit  (Progressing)       Start:  12/18/24    Expected End:  01/01/25            Pt will be IND with sit<>stand  (Progressing)       Start:  12/18/24    Expected End:  01/01/25            Pt will be IND ambulating x150ft  (Progressing)       Start:  12/18/24    Expected End:  01/01/25            Pt will ascend/descend >/= 1 curb step without UE support and SBA  (Progressing)       Start:  12/18/24    Expected End:  01/01/25            [place] >/= 24 (Progressing)       Start:  12/18/24    Expected End:  01/01/25                   Education Documentation  Body Mechanics, taught by Heidi Clemons PT at 12/18/2024  9:54 AM.  Learner: Patient  Readiness: Acceptance  Method: Explanation  Response: Verbalizes Understanding    Mobility Training, taught by Heidi Clemons PT at 12/18/2024  9:54 AM.  Learner: Patient  Readiness: Acceptance  Method: Explanation  Response: Verbalizes Understanding    Education Comments  Edu on role of PT, benefits of mobility     Heidi Clemons PT, DPT

## 2024-12-18 NOTE — PROGRESS NOTES
Viki Diez is a 59 y.o. female on day 0 of admission presenting with Right pontine stroke (Multi).      Subjective   Pt seen at bedside. No acute complaints. Reviewed results of CTA test with her.       Objective     Last Recorded Vitals  /78 (BP Location: Left arm, Patient Position: Lying)   Pulse 70   Temp 36 °C (96.8 °F) (Temporal)   Resp 16   Wt 55.8 kg (123 lb)   SpO2 95%   Intake/Output last 3 Shifts:  No intake or output data in the 24 hours ending 12/18/24 0724    Admission Weight  Weight: 55.8 kg (123 lb) (12/16/24 2147)    Daily Weight  12/16/24 : 55.8 kg (123 lb)    Image Results  CT angio head and neck w and wo IV contrast  Narrative: Interpreted By:  Clifford Herman and Hofer Lindsay   STUDY:  CT ANGIO HEAD AND NECK W AND WO IV CONTRAST;  12/17/2024 9:05 pm      INDICATION:  Signs/Symptoms:acute/subacute stroke, r/o vascular abnormalities          COMPARISON:  CT head 12/17/2024, MRI brain 12/17/2024.      ACCESSION NUMBER(S):  [id number]      ORDERING CLINICIAN:  TRACY MARIEE      TECHNIQUE:  Multiple contiguous axial noncontrast images of the head were  obtained. Following administration of 90 mL of Omnipaque 350  intravenously, a CT angiography of the head and neck was performed.  MIPS and 3D reconstructions of the [place] and neck were  created on an independent workstation and reviewed.      FINDINGS:  NON-CONTRAST HEAD CT:      Findings within the brain parenchyma were detailed on the prior MRI  brain report. Small acute or subacute infarct in the right hemipons  seen on the prior MRI brain corresponds to a region of  hypoattenuation seen on CT.          CTA NECK:      The aortic arch contains no luminal narrowing. There are  atherosclerotic calcifications within the great vessels arising from  the aortic arch and there is mild-to-moderate narrowing involving the  origin of the left common carotid artery and minimal narrowing  involving the origin of the left subclavian  artery reconstitution of  the luminal caliber distally.      There is no narrowing of the bilateral common carotid arteries,  noting that evaluation of the right common carotid artery is somewhat  degraded due to streak artifact from contrast within the adjacent  right internal jugular vein.      There are coarse atherosclerotic calcifications and noncalcified  plaque located within the right carotid bulb and there is associated  greater than 80% luminal narrowing, according to NASCET criteria,  noting that measurement of the true luminal caliber is somewhat  degraded due to presence of coarse atherosclerotic calcifications.  There is no narrowing of the right internal carotid artery.      There are coarse atherosclerotic calcifications located within the  left carotid bulb causing essentially 0% luminal narrowing according  to NASCET criteria. There is no narrowing of the left internal  carotid artery.      There is marked narrowing/occlusion involving 4 mm portion of the  proximal right external carotid artery. There is reconstitution of  the luminal caliber distally. There is narrowing at the origin of the  left external carotid artery, degree of luminal narrowing is somewhat  difficult to determine due to presence of coarse atherosclerotic  calcifications.      Bilateral vertebral arteries arise from the subclavian arteries and  are normal in course and caliber.          CTA HEAD:      There are small amount of atherosclerotic calcification located  within the cavernous segment of the left internal carotid artery  causing no luminal narrowing. There is no narrowing of the bilateral  internal carotid arteries. There is no narrowing of the visualized  portions of the bilateral anterior or middle cerebral arteries.      Bilateral intradural vertebral arteries are diffusely decreased in  luminal caliber, more pronounced on the right and the basilar artery  is diffusely decreased in luminal caliber. These findings  may be  developmental in etiology as there are bilateral prominent posterior  communicating arteries with resulting hypoplasia of the P1 segment of  the left posterior cerebral artery and nonvisualization of the P1  segment of the right posterior cerebral artery, likely due to aplasia  or hypoplasia.      There are no aneurysms.      Additional findings:      There are osseous degenerative changes of the cervical spine.      Impression: CT head:      Loss of gray-white differentiation within the right paramedian fredo  consistent with acute to subacute infarction better evaluated on  same-day MRI brain.      CTA neck:      1. Coarse atherosclerotic calcifications located within the right  carotid bulb causes likely greater than 80% luminal narrowing,  according to NASCET criteria, noting that evaluation is somewhat  difficult due to presence of coarse atherosclerotic calcifications  and can be further evaluated with MRA neck with intravenous contrast  as clinically indicated.      2. Marked narrowing versus focal occlusion of the proximal right  external carotid artery with reconstitution of the luminal caliber  distally.      3. Atherosclerotic calcifications located within the left carotid  bulb causing no striking luminal narrowing.      CTA head:      Likely developmental narrowing of the intradural vertebral arteries  and basilar artery. Otherwise, no striking narrowing of the  visualized arteries in the head.              I personally reviewed the images/study and resident's interpretation  and I agree with the findings as stated by Juliana Arce MD (resident  radiologist). This study was analyzed and interpreted at [facility], [place], [place].          MACRO:  None.      Signed by: Clifford Herman 12/18/2024 7:21 AM  Dictation workstation:   [id number]  ECG 12 Lead  Normal sinus rhythm  Normal ECG  When compared with ECG of 18-OCT-2022 13:33,  No significant change was  found    See ED provider note for full interpretation and clinical correlation  Confirmed by Carmen Mahoney ([id number]) on 12/18/2024 4:54:58 AM      Physical Exam  Gen: well appearing, A+Ox3, in no acute distress  HEENT: sclera anicteric, no conjunctival injection, MMM  Resp: CTAB, normal breath sounds, no wheezing/crackles  CV: RRR, normal S1/S2, no murmurs  GI: non-tender, non-distended, no rebound or guarding  Extremities/MSK: warm and well perfused, no edema bilateral  Neuro: A+Ox3, LUE 4/5, LLE 3/5 strength, sensation intact b/l; no focal CN deficit, normal cerebellar exam; gait not assessed  Skin: warm and dry, no lesions, no rashes     Relevant Results  Scheduled medications  amLODIPine, 5 mg, oral, Daily  aspirin, 81 mg, oral, Daily  atorvastatin, 80 mg, oral, Daily  clopidogrel, 75 mg, oral, Daily  enoxaparin, 40 mg, subcutaneous, Daily  FLUoxetine, 20 mg, oral, Daily  FLUoxetine, 40 mg, oral, Daily  lurasidone, 40 mg, oral, Daily with lunch  mycophenolate, 1,000 mg, oral, q12h  pantoprazole, 20 mg, oral, Daily before breakfast  tacrolimus, 2 mg, oral, q12h SHERICE  traZODone, 200 mg, oral, Nightly      Continuous medications     PRN medications  PRN medications: ondansetron ODT, polyethylene glycol       Assessment/Plan      Viki Diez is a 59 y.o. female with  past medical history of HTN and obstructive uropathy/nephrolithiasis s/p kidney transplant (DDRT 2015) on mycophenolate and tacrolimus, PRUDENCIO, and Depression.  She presented to the ED with left sided motor weakness that started 4 to 5 days ago, CT brain without evidence of acute intracranial abnormality, no symptoms or signs suggestive of infectious etiology. She was admitted under medical care for further workup and evaluation.  Patient is currently hemodynamically stable well-appearing, with no infectious signs and symptoms; antibiotics for infectious etiology were not were not started but low threshold if c/f infection or signs of hemodynamic  compromise.  Hyponatremia improving on repeat labs.  Will consult neuro and transplant nephrology to coordinate care while admitted.    Updates 12/18:  -no acute intervention per neuro team based on results of CTA  [ ] f/u TTE with bubble study (scheduled, in process)  -1 L bolus per transplant nephro recs  -will monitor her closely for another day, likely dc 12/19      #Left sided motor weakness  :: started 4-5d ago, came to hospital now per her daughter's insistence   :: EKG on admission: NSR  :: CT brain without acute insult/bleed  :: MRI brain with evidence of acute to early subacute infarction in the right paramedian fredo., WITHOUT mass effect or hemorrhagic transformation   :: TSH wnl  ::  (H)  :: Hba1c 5.2    PLAN  - Neurology consulted, recs below:   -CTA H+N   -TTE with bubble study   -DAPT for 21d   -Atorvastatin 80mg   -outpt f/u in [facility] (neuro team will arrange at dc)  - TTE ordered to r/o PFO     # Euvolemic Hyponatremia  :: Serum and urine osmolality ordered  :: Unclear cause at this time could be related to medication.  :: The patient's lurasidone dose was recently increased in the past few weeks from 20 mg to 40 mg  :: improved from 129 > 133    PLAN  - monitor I&O  - cont to monitor Na on daily RFPs  - consider tapering down lurisadone if am labs reflect worsening hyponatremia and work up      # HTN  PLAN  - Continue Amlodipine     # Obstructive uropathy/nephrolithiasis s/p kidney transplant (DDRT 2015) on mycophenolate and tacrolimus,   :: tacrolimus level 6.5 at 5:39am  PLAN  - Continue home dose of tacrolimus 2 mg twice daily, CellCept 1000 mg twice daily.  [ ] Transplant nephrology consulted, f/u recs     # PRUDENCIO, Depression.  # Insomnia  :: On Prozac 60 mg daily, lurasidone 40 mg daily, trazodone 200 mg nightly  :: The patient's lurasidone dose was recently increased in the past few weeks from 20 mg to 40 mg   PLAN  -c/w  Prozac 60 mg daily, lurasidone 40 mg daily, trazodone 200 mg  nightly      F: Replete PRN  N: Regular diet  A: PIV  O2: On RA  DVT ppx: Enox SQ  GI ppx: -   Code Status: Full code  Contact: George Diez (Sibling)  [phone] (Work Phone)     Assessment & Plan  Hyponatremia    CKD (chronic kidney disease), stage II    PRUDENCIO (generalized anxiety disorder)    Immunosuppression    Kidney replaced by transplant ([facility])    Right pontine stroke (Multi)    Moderate episode of recurrent major depressive disorder                  Apryl Rowe MD  Internal Medicine PGY1

## 2024-12-18 NOTE — SIGNIFICANT EVENT
"Assessment:  Viki Diez is a 59 y.o. Right-handed female with PMH of HTN, renal transplant, PRUDENCIO, and depression who presented to [facility] on 12/16/2024 with 5 day history of L sided weakness, stroke consulted for \"L-sided weakness started 4-5 days ago.\"     Clinical history and exam and neurologic imaging is consistent with a pure motor stroke due to a small vessel disease causing acute ischemic infarct of the right paramedian fredo.  Will start dual antiplatelet therapy and have patient follow-up in stroke clinic for optimization. CTA does show 80% R ICA stenosis and a fetal R PCA however given this is a stroke of the pontine perforators we believe this is too far inferior to be considered a symptomatic vessel. Will continue to follow in outpatient clinic.     Type: Cerebral Infarction  Subtype: Small-vessel occlusion  Vessels Involved: Pontine Perforators  Neurologic Manifestations: Hemiparesis  Deficits: NIHSS   Initial treatment: None  Anti-platelets or Anti-coagulation management: Aspirin and Plavix  Vascular Risk Factors: HTN and HLD  BP goal: Normotension  Disposition: Home with outpatient PT/OT     Stroke workup  - CTA with 80% stenosis in R ICA; small basillar artery without clear stenosis  - TTE EF 73%; bubble study negative; LA normal size  - , A1c: 5.2 %    Recommendations:    Per [facility] protocol- DAPT (ASA & Plavix) for 21 days then Aspirin monotherapy indefinitely     Continue Atorvastatin 80mg    Writer will arrange stroke clinic follow up    Stroke will sign off; please call back with questions    Vascular Risk Factor modification goals:  Blood pressure goals: avoid hypotension SBP <100 that could worsen cerebral perfusion,  Normotension  Lipid Goals: education on healthy diet and statin therapy to maintain or achieve goal LDL-cholesterol < 70mg  Glucose Goals: early treatment of hyperglycemia to goal glucose 140-180 mg/dl with long-term goal A1c < 7%   Smoking Cessation and " Education  Assessment for Rehabilitation needs including PT/OT and if indicated swallow evaluation and speech therapy   Patient and family education on signs and symptoms of stroke, calling 911, risk factors, and healthy strategies for stroke prevention.       Dang Guidry MD  PGY-4 Neurology  Stroke [phone]

## 2024-12-18 NOTE — PROGRESS NOTES
Viki Diez   59 y.o.    @@  [facility]/Room: [id number]    Subjective: no events    Objective:     Meds:   amLODIPine, 5 mg, Daily  aspirin, 81 mg, Daily  atorvastatin, 80 mg, Daily  clopidogrel, 75 mg, Daily  enoxaparin, 40 mg, Daily  FLUoxetine, 20 mg, Daily  FLUoxetine, 40 mg, Daily  lactated Ringer's, 1,000 mL, Once  lurasidone, 40 mg, Daily with lunch  magnesium oxide, 400 mg, BID  mycophenolate, 1,000 mg, q12h  pantoprazole, 20 mg, Daily before breakfast  tacrolimus, 2 mg, q12h SHERICE  traZODone, 200 mg, Nightly         acetaminophen, 650 mg, q6h PRN  ondansetron ODT, 4 mg, q8h PRN  polyethylene glycol, 17 g, Daily PRN        Vitals:    12/18/24 1253   BP: 124/87   Pulse: 77   Resp: 16   Temp:    SpO2: 96%        No intake or output data in the 24 hours ending 12/18/24 1445    Gen: well appearing, A+Ox3, in no acute distress  HEENT: sclera anicteric, no conjunctival injection, MMM  Resp: CTAB, normal breath sounds, no wheezing/crackles  CV: RRR, normal S1/S2, no murmurs  GI: non-tender, non-distended, no rebound or guarding  Extremities/MSK: warm and well perfused, no edema bilateral  Neuro: A+Ox3, deficits per neurology note  Skin: warm and dry, no lesions, no rashes    Blood Labs:  Results for orders placed or performed during the hospital encounter of 12/16/24 (from the past 24 hours)   CBC and Auto Differential   Result Value Ref Range    WBC 11.3 4.4 - 11.3 x10*3/uL    nRBC 0.0 0.0 - 0.0 /100 WBCs    RBC 3.98 (L) 4.00 - 5.20 x10*6/uL    Hemoglobin 11.9 (L) 12.0 - 16.0 g/dL    Hematocrit 34.9 (L) 36.0 - 46.0 %    MCV 88 80 - 100 fL    MCH 29.9 26.0 - 34.0 pg    MCHC 34.1 32.0 - 36.0 g/dL    RDW 12.9 11.5 - 14.5 %    Platelets 322 150 - 450 x10*3/uL    Neutrophils % 70.4 40.0 - 80.0 %    Immature Granulocytes %, Automated 0.3 0.0 - 0.9 %    Lymphocytes % 15.9 13.0 - 44.0 %    Monocytes % 10.7 2.0 - 10.0 %    Eosinophils % 2.1 0.0 - 6.0 %    Basophils % 0.6 0.0 - 2.0 %    Neutrophils Absolute  7.94 (H) 1.20 - 7.70 x10*3/uL    Immature Granulocytes Absolute, Automated 0.03 0.00 - 0.70 x10*3/uL    Lymphocytes Absolute 1.79 1.20 - 4.80 x10*3/uL    Monocytes Absolute 1.21 (H) 0.10 - 1.00 x10*3/uL    Eosinophils Absolute 0.24 0.00 - 0.70 x10*3/uL    Basophils Absolute 0.07 0.00 - 0.10 x10*3/uL   Renal Function Panel   Result Value Ref Range    Glucose 118 (H) 74 - 99 mg/dL    Sodium 133 (L) 136 - 145 mmol/L    Potassium 4.4 3.5 - 5.3 mmol/L    Chloride 100 98 - 107 mmol/L    Bicarbonate 22 21 - 32 mmol/L    Anion Gap 15 10 - 20 mmol/L    Urea Nitrogen 16 6 - 23 mg/dL    Creatinine 1.10 (H) 0.50 - 1.05 mg/dL    eGFR 59 (L) >60 mL/min/1.73m*2    Calcium 10.0 8.6 - 10.6 mg/dL    Phosphorus 3.2 2.5 - 4.9 mg/dL    Albumin 4.0 3.4 - 5.0 g/dL   Magnesium   Result Value Ref Range    Magnesium 1.36 (L) 1.60 - 2.40 mg/dL   Transthoracic Echo (TTE) Complete   Result Value Ref Range    AV pk arnol 1.64 m/s    LVOT diam 1.90 cm    MV E/A ratio 0.77     LA vol index A/L 16.6 ml/m2    Tricuspid annular plane systolic excursion 1.6 cm    LV EF 73 %    RV free wall pk S' 11.10 cm/s    LVIDd 4.10 cm    RVSP 21.0 mmHg    Aortic Valve Area by Continuity of Peak Velocity 2.16 cm2    AV pk grad 11 mmHg    LV A4C EF 79.8      *Note: Due to a large number of results and/or encounters for the requested time period, some results have not been displayed. A complete set of results can be found in Results Review.              ASSESSMENT:    Viki Diez is a 59 y.o. female with PMHx of HTN and obstructive uropathy/nephrolithiasis s/p kidney transplant (DDKT 2015) on mycophenolate and tacrolimus, PRUDENCIO, Depression. Admitted with acute ischemic stroke.      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     #Acute ischemic stroke  - CTA done 12/17 showed Right carotid disease,  cerebral infarction.        Recommendations:  - IV Hydration  - Continue immunosuppressive medications  - Electrolyte repletion as appropriate  - Tacrolimus trough level.      Paris Duran MD  Nephrology Fellow   Daytime / Weekend Renal Pager [phone]  After 7 pm Emergencies [phone] Pager [phone]

## 2024-12-19 ENCOUNTER — HOME HEALTH ADMISSION (OUTPATIENT)
Dept: HOME HEALTH SERVICES | Facility: HOME HEALTH | Age: 59
End: 2024-12-19
Payer: MEDICARE

## 2024-12-19 ENCOUNTER — PHARMACY VISIT (OUTPATIENT)
Dept: PHARMACY | Facility: CLINIC | Age: 59
End: 2024-12-19
Payer: COMMERCIAL

## 2024-12-19 VITALS
DIASTOLIC BLOOD PRESSURE: 71 MMHG | HEART RATE: 68 BPM | HEIGHT: 67 IN | SYSTOLIC BLOOD PRESSURE: 117 MMHG | TEMPERATURE: 97.7 F | BODY MASS INDEX: 18.69 KG/M2 | OXYGEN SATURATION: 99 % | RESPIRATION RATE: 12 BRPM | WEIGHT: 119.05 LBS

## 2024-12-19 LAB
ALBUMIN SERPL BCP-MCNC: 4 G/DL (ref 3.4–5)
ANION GAP SERPL CALC-SCNC: 13 MMOL/L (ref 10–20)
BASOPHILS # BLD AUTO: 0.04 X10*3/UL (ref 0–0.1)
BASOPHILS NFR BLD AUTO: 0.5 %
BUN SERPL-MCNC: 11 MG/DL (ref 6–23)
CALCIUM SERPL-MCNC: 10 MG/DL (ref 8.6–10.6)
CHLORIDE SERPL-SCNC: 101 MMOL/L (ref 98–107)
CO2 SERPL-SCNC: 24 MMOL/L (ref 21–32)
CREAT SERPL-MCNC: 0.75 MG/DL (ref 0.5–1.05)
EGFRCR SERPLBLD CKD-EPI 2021: >90 ML/MIN/1.73M*2
EOSINOPHIL # BLD AUTO: 0.17 X10*3/UL (ref 0–0.7)
EOSINOPHIL NFR BLD AUTO: 1.9 %
ERYTHROCYTE [DISTWIDTH] IN BLOOD BY AUTOMATED COUNT: 12.8 % (ref 11.5–14.5)
GLUCOSE SERPL-MCNC: 109 MG/DL (ref 74–99)
HCT VFR BLD AUTO: 35.1 % (ref 36–46)
HGB BLD-MCNC: 11.8 G/DL (ref 12–16)
IMM GRANULOCYTES # BLD AUTO: 0.02 X10*3/UL (ref 0–0.7)
IMM GRANULOCYTES NFR BLD AUTO: 0.2 % (ref 0–0.9)
LYMPHOCYTES # BLD AUTO: 1.89 X10*3/UL (ref 1.2–4.8)
LYMPHOCYTES NFR BLD AUTO: 21.3 %
MAGNESIUM SERPL-MCNC: 2.01 MG/DL (ref 1.6–2.4)
MCH RBC QN AUTO: 30.2 PG (ref 26–34)
MCHC RBC AUTO-ENTMCNC: 33.6 G/DL (ref 32–36)
MCV RBC AUTO: 90 FL (ref 80–100)
MONOCYTES # BLD AUTO: 1.19 X10*3/UL (ref 0.1–1)
MONOCYTES NFR BLD AUTO: 13.4 %
NEUTROPHILS # BLD AUTO: 5.56 X10*3/UL (ref 1.2–7.7)
NEUTROPHILS NFR BLD AUTO: 62.7 %
NRBC BLD-RTO: 0 /100 WBCS (ref 0–0)
PHOSPHATE SERPL-MCNC: 2.9 MG/DL (ref 2.5–4.9)
PLATELET # BLD AUTO: 310 X10*3/UL (ref 150–450)
POTASSIUM SERPL-SCNC: 4.2 MMOL/L (ref 3.5–5.3)
RBC # BLD AUTO: 3.91 X10*6/UL (ref 4–5.2)
SODIUM SERPL-SCNC: 134 MMOL/L (ref 136–145)
TACROLIMUS BLD-MCNC: 5.9 NG/ML
WBC # BLD AUTO: 8.9 X10*3/UL (ref 4.4–11.3)

## 2024-12-19 PROCEDURE — 2500000004 HC RX 250 GENERAL PHARMACY W/ HCPCS (ALT 636 FOR OP/ED)

## 2024-12-19 PROCEDURE — 2500000001 HC RX 250 WO HCPCS SELF ADMINISTERED DRUGS (ALT 637 FOR MEDICARE OP)

## 2024-12-19 PROCEDURE — 99233 SBSQ HOSP IP/OBS HIGH 50: CPT | Performed by: HOSPITALIST

## 2024-12-19 PROCEDURE — 85025 COMPLETE CBC W/AUTO DIFF WBC: CPT

## 2024-12-19 PROCEDURE — RXMED WILLOW AMBULATORY MEDICATION CHARGE

## 2024-12-19 PROCEDURE — 36415 COLL VENOUS BLD VENIPUNCTURE: CPT

## 2024-12-19 PROCEDURE — 83735 ASSAY OF MAGNESIUM: CPT

## 2024-12-19 PROCEDURE — 99239 HOSP IP/OBS DSCHRG MGMT >30: CPT

## 2024-12-19 PROCEDURE — 97165 OT EVAL LOW COMPLEX 30 MIN: CPT | Mod: GO | Performed by: OCCUPATIONAL THERAPIST

## 2024-12-19 PROCEDURE — 80069 RENAL FUNCTION PANEL: CPT

## 2024-12-19 PROCEDURE — 2500000002 HC RX 250 W HCPCS SELF ADMINISTERED DRUGS (ALT 637 FOR MEDICARE OP, ALT 636 FOR OP/ED)

## 2024-12-19 PROCEDURE — 80197 ASSAY OF TACROLIMUS: CPT

## 2024-12-19 RX ORDER — PROCHLORPERAZINE EDISYLATE 5 MG/ML
10 INJECTION INTRAMUSCULAR; INTRAVENOUS EVERY 6 HOURS PRN
Status: DISCONTINUED | OUTPATIENT
Start: 2024-12-19 | End: 2024-12-19 | Stop reason: HOSPADM

## 2024-12-19 RX ORDER — ACETAMINOPHEN 325 MG/1
650 TABLET ORAL EVERY 8 HOURS PRN
Qty: 30 TABLET | Refills: 1 | Status: SHIPPED | OUTPATIENT
Start: 2024-12-19 | End: 2025-02-17

## 2024-12-19 RX ORDER — ACETAMINOPHEN 325 MG/1
650 TABLET ORAL EVERY 6 HOURS PRN
Status: DISCONTINUED | OUTPATIENT
Start: 2024-12-19 | End: 2024-12-19 | Stop reason: HOSPADM

## 2024-12-19 RX ORDER — PROCHLORPERAZINE MALEATE 10 MG
10 TABLET ORAL EVERY 6 HOURS PRN
Status: DISCONTINUED | OUTPATIENT
Start: 2024-12-19 | End: 2024-12-19 | Stop reason: HOSPADM

## 2024-12-19 RX ORDER — PROCHLORPERAZINE MALEATE 10 MG
10 TABLET ORAL EVERY 6 HOURS PRN
Qty: 30 TABLET | Refills: 1 | Status: SHIPPED | OUTPATIENT
Start: 2024-12-19 | End: 2025-02-17

## 2024-12-19 SDOH — HEALTH STABILITY: PHYSICAL HEALTH
HOW OFTEN DO YOU NEED TO HAVE SOMEONE HELP YOU WHEN YOU READ INSTRUCTIONS, PAMPHLETS, OR OTHER WRITTEN MATERIAL FROM YOUR DOCTOR OR PHARMACY?: NEVER

## 2024-12-19 SDOH — SOCIAL STABILITY: SOCIAL NETWORK
DO YOU BELONG TO ANY CLUBS OR ORGANIZATIONS SUCH AS CHURCH GROUPS, UNIONS, FRATERNAL OR ATHLETIC GROUPS, OR SCHOOL GROUPS?: NO

## 2024-12-19 SDOH — SOCIAL STABILITY: SOCIAL INSECURITY: HAVE YOU HAD ANY THOUGHTS OF HARMING ANYONE ELSE?: NO

## 2024-12-19 SDOH — ECONOMIC STABILITY: FOOD INSECURITY: HOW HARD IS IT FOR YOU TO PAY FOR THE VERY BASICS LIKE FOOD, HOUSING, MEDICAL CARE, AND HEATING?: HARD

## 2024-12-19 SDOH — ECONOMIC STABILITY: FOOD INSECURITY: WITHIN THE PAST 12 MONTHS, THE FOOD YOU BOUGHT JUST DIDN'T LAST AND YOU DIDN'T HAVE MONEY TO GET MORE.: SOMETIMES TRUE

## 2024-12-19 SDOH — ECONOMIC STABILITY: HOUSING INSECURITY: IN THE LAST 12 MONTHS, WAS THERE A TIME WHEN YOU WERE NOT ABLE TO PAY THE MORTGAGE OR RENT ON TIME?: YES

## 2024-12-19 SDOH — ECONOMIC STABILITY: FOOD INSECURITY
WITHIN THE PAST 12 MONTHS, YOU WORRIED THAT YOUR FOOD WOULD RUN OUT BEFORE YOU GOT THE MONEY TO BUY MORE.: SOMETIMES TRUE

## 2024-12-19 SDOH — SOCIAL STABILITY: SOCIAL INSECURITY: DO YOU FEEL UNSAFE GOING BACK TO THE PLACE WHERE YOU ARE LIVING?: NO

## 2024-12-19 SDOH — SOCIAL STABILITY: SOCIAL NETWORK: HOW OFTEN DO YOU GET TOGETHER WITH FRIENDS OR RELATIVES?: ONCE A WEEK

## 2024-12-19 SDOH — SOCIAL STABILITY: SOCIAL NETWORK: IN A TYPICAL WEEK, HOW MANY TIMES DO YOU TALK ON THE PHONE WITH FAMILY, FRIENDS, OR NEIGHBORS?: ONCE A WEEK

## 2024-12-19 SDOH — ECONOMIC STABILITY: HOUSING INSECURITY: AT ANY TIME IN THE PAST 12 MONTHS, WERE YOU HOMELESS OR LIVING IN A SHELTER (INCLUDING NOW)?: NO

## 2024-12-19 SDOH — SOCIAL STABILITY: SOCIAL INSECURITY: WITHIN THE LAST YEAR, HAVE YOU BEEN HUMILIATED OR EMOTIONALLY ABUSED IN OTHER WAYS BY YOUR PARTNER OR EX-PARTNER?: NO

## 2024-12-19 SDOH — SOCIAL STABILITY: SOCIAL INSECURITY
WITHIN THE LAST YEAR, HAVE YOU BEEN KICKED, HIT, SLAPPED, OR OTHERWISE PHYSICALLY HURT BY YOUR PARTNER OR EX-PARTNER?: NO

## 2024-12-19 SDOH — HEALTH STABILITY: PHYSICAL HEALTH: ON AVERAGE, HOW MANY MINUTES DO YOU ENGAGE IN EXERCISE AT THIS LEVEL?: 60 MIN

## 2024-12-19 SDOH — SOCIAL STABILITY: SOCIAL INSECURITY: ARE THERE ANY APPARENT SIGNS OF INJURIES/BEHAVIORS THAT COULD BE RELATED TO ABUSE/NEGLECT?: NO

## 2024-12-19 SDOH — SOCIAL STABILITY: SOCIAL INSECURITY
WITHIN THE LAST YEAR, HAVE YOU BEEN RAPED OR FORCED TO HAVE ANY KIND OF SEXUAL ACTIVITY BY YOUR PARTNER OR EX-PARTNER?: NO

## 2024-12-19 SDOH — SOCIAL STABILITY: SOCIAL INSECURITY: ARE YOU MARRIED, WIDOWED, DIVORCED, SEPARATED, NEVER MARRIED, OR LIVING WITH A PARTNER?: MARRIED

## 2024-12-19 SDOH — SOCIAL STABILITY: SOCIAL INSECURITY: WITHIN THE LAST YEAR, HAVE YOU BEEN AFRAID OF YOUR PARTNER OR EX-PARTNER?: NO

## 2024-12-19 SDOH — HEALTH STABILITY: PHYSICAL HEALTH: ON AVERAGE, HOW MANY DAYS PER WEEK DO YOU ENGAGE IN MODERATE TO STRENUOUS EXERCISE (LIKE A BRISK WALK)?: 5 DAYS

## 2024-12-19 SDOH — HEALTH STABILITY: MENTAL HEALTH
DO YOU FEEL STRESS - TENSE, RESTLESS, NERVOUS, OR ANXIOUS, OR UNABLE TO SLEEP AT NIGHT BECAUSE YOUR MIND IS TROUBLED ALL THE TIME - THESE DAYS?: NOT AT ALL

## 2024-12-19 SDOH — SOCIAL STABILITY: SOCIAL NETWORK: HOW OFTEN DO YOU ATTEND MEETINGS OF THE CLUBS OR ORGANIZATIONS YOU BELONG TO?: NEVER

## 2024-12-19 SDOH — SOCIAL STABILITY: SOCIAL INSECURITY: ARE YOU OR HAVE YOU BEEN THREATENED OR ABUSED PHYSICALLY, EMOTIONALLY, OR SEXUALLY BY ANYONE?: NO

## 2024-12-19 SDOH — ECONOMIC STABILITY: INCOME INSECURITY: IN THE PAST 12 MONTHS HAS THE ELECTRIC, GAS, OIL, OR WATER COMPANY THREATENED TO SHUT OFF SERVICES IN YOUR HOME?: NO

## 2024-12-19 SDOH — SOCIAL STABILITY: SOCIAL INSECURITY: HAS ANYONE EVER THREATENED TO HURT YOUR FAMILY OR YOUR PETS?: NO

## 2024-12-19 SDOH — ECONOMIC STABILITY: HOUSING INSECURITY: IN THE PAST 12 MONTHS, HOW MANY TIMES HAVE YOU MOVED WHERE YOU WERE LIVING?: 0

## 2024-12-19 SDOH — SOCIAL STABILITY: SOCIAL INSECURITY: WERE YOU ABLE TO COMPLETE ALL THE BEHAVIORAL HEALTH SCREENINGS?: YES

## 2024-12-19 SDOH — ECONOMIC STABILITY: TRANSPORTATION INSECURITY: IN THE PAST 12 MONTHS, HAS LACK OF TRANSPORTATION KEPT YOU FROM MEDICAL APPOINTMENTS OR FROM GETTING MEDICATIONS?: NO

## 2024-12-19 SDOH — SOCIAL STABILITY: SOCIAL INSECURITY: DOES ANYONE TRY TO KEEP YOU FROM HAVING/CONTACTING OTHER FRIENDS OR DOING THINGS OUTSIDE YOUR HOME?: NO

## 2024-12-19 SDOH — SOCIAL STABILITY: SOCIAL INSECURITY: DO YOU FEEL ANYONE HAS EXPLOITED OR TAKEN ADVANTAGE OF YOU FINANCIALLY OR OF YOUR PERSONAL PROPERTY?: NO

## 2024-12-19 SDOH — SOCIAL STABILITY: SOCIAL NETWORK: HOW OFTEN DO YOU ATTEND CHURCH OR RELIGIOUS SERVICES?: NEVER

## 2024-12-19 SDOH — SOCIAL STABILITY: SOCIAL INSECURITY: HAVE YOU HAD THOUGHTS OF HARMING ANYONE ELSE?: NO

## 2024-12-19 SDOH — SOCIAL STABILITY: SOCIAL INSECURITY: ABUSE: ADULT

## 2024-12-19 ASSESSMENT — ACTIVITIES OF DAILY LIVING (ADL)
LACK_OF_TRANSPORTATION: NO
BATHING_ASSISTANCE: STAND BY
LACK_OF_TRANSPORTATION: NO
ADL_ASSISTANCE: INDEPENDENT

## 2024-12-19 ASSESSMENT — COGNITIVE AND FUNCTIONAL STATUS - GENERAL
DRESSING REGULAR LOWER BODY CLOTHING: A LITTLE
DAILY ACTIVITIY SCORE: 21
HELP NEEDED FOR BATHING: A LITTLE
TOILETING: A LITTLE

## 2024-12-19 ASSESSMENT — LIFESTYLE VARIABLES
SKIP TO QUESTIONS 9-10: 0
HOW OFTEN DO YOU HAVE A DRINK CONTAINING ALCOHOL: 2-3 TIMES A WEEK
HOW OFTEN DO YOU HAVE 6 OR MORE DRINKS ON ONE OCCASION: NEVER
SUBSTANCE_ABUSE_PAST_12_MONTHS: YES
HOW OFTEN DURING THE LAST YEAR HAVE YOU BEEN UNABLE TO REMEMBER WHAT HAPPENED THE NIGHT BEFORE BECAUSE YOU HAD BEEN DRINKING: NEVER
HOW OFTEN DURING THE LAST YEAR HAVE YOU FOUND THAT YOU WERE NOT ABLE TO STOP DRINKING ONCE YOU HAD STARTED: NEVER
AUDIT TOTAL SCORE: 4
HAS A RELATIVE, FRIEND, DOCTOR, OR ANOTHER HEALTH PROFESSIONAL EXPRESSED CONCERN ABOUT YOUR DRINKING OR SUGGESTED YOU CUT DOWN: NO
AUDIT-C TOTAL SCORE: 4
AUDIT-C TOTAL SCORE: 4
HOW OFTEN DURING THE LAST YEAR HAVE YOU HAD A FEELING OF GUILT OR REMORSE AFTER DRINKING: NEVER
PRESCIPTION_ABUSE_PAST_12_MONTHS: NO
HOW MANY STANDARD DRINKS CONTAINING ALCOHOL DO YOU HAVE ON A TYPICAL DAY: 3 OR 4
AUDIT TOTAL SCORE: 0
HAVE YOU OR SOMEONE ELSE BEEN INJURED AS A RESULT OF YOUR DRINKING: NO
HOW OFTEN DURING THE LAST YEAR HAVE YOU FAILED TO DO WHAT WAS NORMALLY EXPECTED FROM YOU BECAUSE OF DRINKING: NEVER
HOW OFTEN DURING THE LAST YEAR HAVE YOU NEEDED AN ALCOHOLIC DRINK FIRST THING IN THE MORNING TO GET YOURSELF GOING AFTER A NIGHT OF HEAVY DRINKING: NEVER

## 2024-12-19 ASSESSMENT — PAIN DESCRIPTION - LOCATION: LOCATION: HEAD

## 2024-12-19 ASSESSMENT — PAIN SCALES - GENERAL
PAINLEVEL_OUTOF10: 10 - WORST POSSIBLE PAIN
PAINLEVEL_OUTOF10: 10 - WORST POSSIBLE PAIN

## 2024-12-19 ASSESSMENT — PATIENT HEALTH QUESTIONNAIRE - PHQ9
1. LITTLE INTEREST OR PLEASURE IN DOING THINGS: NOT AT ALL
SUM OF ALL RESPONSES TO PHQ9 QUESTIONS 1 & 2: 0
2. FEELING DOWN, DEPRESSED OR HOPELESS: NOT AT ALL

## 2024-12-19 NOTE — PROGRESS NOTES
Viki Diez   59 y.o.    @WT@  N/Room: [id number]/6066/6066-B    Subjective: no events    Objective:     Meds:   amLODIPine, 5 mg, Daily  aspirin, 81 mg, Daily  atorvastatin, 80 mg, Daily  clopidogrel, 75 mg, Daily  enoxaparin, 40 mg, Daily  FLUoxetine, 20 mg, Daily  FLUoxetine, 40 mg, Daily  lurasidone, 40 mg, Daily with lunch  magnesium oxide, 400 mg, BID  mycophenolate, 1,000 mg, q12h  pantoprazole, 20 mg, Daily before breakfast  perflutren lipid microspheres, 0.5-10 mL of dilution, Once in imaging  perflutren protein A microsphere, 0.5 mL, Once in imaging  sulfur hexafluoride microsphr, 2 mL, Once in imaging  tacrolimus, 2 mg, q12h SHERICE  traZODone, 200 mg, Nightly         acetaminophen, 650 mg, q6h PRN  melatonin, 5 mg, Nightly PRN  ondansetron ODT, 4 mg, q8h PRN  polyethylene glycol, 17 g, Daily PRN  prochlorperazine, 10 mg, q6h PRN   Or  prochlorperazine, 10 mg, q6h PRN        Vitals:    12/19/24 1212   BP: 117/71   Pulse: 68   Resp: 12   Temp: 36.5 °C (97.7 °F)   SpO2: 99%          Intake/Output Summary (Last 24 hours) at 12/19/2024 1437  Last data filed at 12/19/2024 0340  Gross per 24 hour   Intake --   Output 350 ml   Net -350 ml       Gen: well appearing, A+Ox3, in no acute distress  HEENT: sclera anicteric, no conjunctival injection, MMM  Resp: CTAB, normal breath sounds, no wheezing/crackles  CV: RRR, normal S1/S2, no murmurs  GI: non-tender, non-distended, no rebound or guarding  Extremities/MSK: warm and well perfused, no edema bilateral  Neuro: A+Ox3, deficits per neurology note  Skin: warm and dry, no lesions, no rashes    Blood Labs:  Results for orders placed or performed during the hospital encounter of 12/16/24 (from the past 24 hours)   CBC and Auto Differential   Result Value Ref Range    WBC 8.9 4.4 - 11.3 x10*3/uL    nRBC 0.0 0.0 - 0.0 /100 WBCs    RBC 3.91 (L) 4.00 - 5.20 x10*6/uL    Hemoglobin 11.8 (L) 12.0 - 16.0 g/dL    Hematocrit 35.1 (L) 36.0 - 46.0 %    MCV 90 80 - 100 fL    MCH  30.2 26.0 - 34.0 pg    MCHC 33.6 32.0 - 36.0 g/dL    RDW 12.8 11.5 - 14.5 %    Platelets 310 150 - 450 x10*3/uL    Neutrophils % 62.7 40.0 - 80.0 %    Immature Granulocytes %, Automated 0.2 0.0 - 0.9 %    Lymphocytes % 21.3 13.0 - 44.0 %    Monocytes % 13.4 2.0 - 10.0 %    Eosinophils % 1.9 0.0 - 6.0 %    Basophils % 0.5 0.0 - 2.0 %    Neutrophils Absolute 5.56 1.20 - 7.70 x10*3/uL    Immature Granulocytes Absolute, Automated 0.02 0.00 - 0.70 x10*3/uL    Lymphocytes Absolute 1.89 1.20 - 4.80 x10*3/uL    Monocytes Absolute 1.19 (H) 0.10 - 1.00 x10*3/uL    Eosinophils Absolute 0.17 0.00 - 0.70 x10*3/uL    Basophils Absolute 0.04 0.00 - 0.10 x10*3/uL   Renal Function Panel   Result Value Ref Range    Glucose 109 (H) 74 - 99 mg/dL    Sodium 134 (L) 136 - 145 mmol/L    Potassium 4.2 3.5 - 5.3 mmol/L    Chloride 101 98 - 107 mmol/L    Bicarbonate 24 21 - 32 mmol/L    Anion Gap 13 10 - 20 mmol/L    Urea Nitrogen 11 6 - 23 mg/dL    Creatinine 0.75 0.50 - 1.05 mg/dL    eGFR >90 >60 mL/min/1.73m*2    Calcium 10.0 8.6 - 10.6 mg/dL    Phosphorus 2.9 2.5 - 4.9 mg/dL    Albumin 4.0 3.4 - 5.0 g/dL   Magnesium   Result Value Ref Range    Magnesium 2.01 1.60 - 2.40 mg/dL   Tacrolimus level   Result Value Ref Range    Tacrolimus  5.9 <=15.0 ng/mL     *Note: Due to a large number of results and/or encounters for the requested time period, some results have not been displayed. A complete set of results can be found in Results Review.              ASSESSMENT:    Viki Diez is a 59 y.o. female with PMHx of HTN and obstructive uropathy/nephrolithiasis s/p kidney transplant (DDKT 2015) on mycophenolate and tacrolimus, PRUDENCIO, Depression. Admitted with acute ischemic stroke.      #MIL  - Baseline Cr 0.7,   - Possibly contrast with poor intake.    #DDKT  - DDKT 2015  - Immunosuppression: Cellcept 1 g BID, Tacrolimus 2 mg BID  - Baseline Cr 0.7 admitted with Cr at baseline.     #Acute ischemic stroke  - CTA done 12/17 showed Right carotid  disease, cerebral infarction.        Recommendations:  - KFTs returned to baseline, Can discharge from nephrology standpoint with follow up.      Paris Duran MD  Nephrology Fellow   Daytime / Weekend Renal Pager [phone]  After 7 pm Emergencies [phone] Pager [phone]

## 2024-12-19 NOTE — PROGRESS NOTES
Occupational Therapy    Evaluation    Patient Name: Viki Diez  MRN: [id number]  Department: [facility] 60  Room: 6066/6066-B  Today's Date: 12/19/2024  Time Calculation  Start Time: 1221  Stop Time: 1231  Time Calculation (min): 10 min        Assessment:  OT Assessment: Pt presents to OT near functional baseline, completing ADLs and functional transfers and mobility with SUP.  At this time reports resolution of LUE deficits and denies further concerns for OT.  Pt does not warrant continued acute care OT services at this time; please re-consult should there be a change in pt status.  Prognosis: Excellent  Barriers to Discharge Home: No anticipated barriers  Evaluation/Treatment Tolerance: Patient tolerated treatment well  Medical Staff Made Aware: Yes  End of Session Communication: Bedside nurse  End of Session Patient Position: Bed, 2 rail up, Alarm off, not on at start of session  Prognosis: Excellent  Evaluation/Treatment Tolerance: Patient tolerated treatment well  Medical Staff Made Aware: Yes  Plan:  No Skilled OT: No acute OT goals identified  OT Frequency: OT eval only  OT Discharge Recommendations: No further acute OT, No OT needed after discharge  OT Recommended Transfer Status: Stand by assist  OT - OK to Discharge: Yes       Subjective   Current Problem:  1. Right pontine stroke (Multi)  Referral to Primary Care    acetaminophen (Tylenol) 325 mg tablet      2. Hyponatremia        3. Left-sided weakness  Transthoracic Echo (TTE) Complete    Transthoracic Echo (TTE) Complete      4. Hx of kidney transplant (HHS-HCC)        5. Other cerebral infarction  Transthoracic Echo (TTE) Complete    Transthoracic Echo (TTE) Complete      6. Left pontine stroke (Multi)  Referral to Neurology    aspirin 81 mg chewable tablet    atorvastatin (Lipitor) 80 mg tablet    clopidogrel (Plavix) 75 mg tablet      7. Other recurrent depressive disorders  FLUoxetine (PROzac) 20 mg capsule    FLUoxetine (PROzac) 40 mg capsule     "  8. Hypomagnesemia  magnesium oxide (Mag-Ox) 400 mg (241.3 mg magnesium) tablet      9. Nausea  prochlorperazine (Compazine) 10 mg tablet        General:  General  Reason for Referral: acute ischemic stroke  Past Medical History Relevant to Rehab: HTN, obstructive uropathy/nephrolithias s/p DDKT in 2015, PRUDENCIO, depression  Patient Position Received: Bed, 2 rail up, Alarm off, not on at start of session  General Comment: Met in bed, pleasant and cooperative, reports resolvement of LUE issues and eager to discharge home    Precautions:  Medical Precautions: Fall precautions    Vital Sign (Past 2hrs)        Date/Time Vitals Session Patient Position Pulse Resp SpO2 BP MAP (mmHg)    12/19/24 12:12:41 --  --  68  12  99 %  117/71  86                   Pain:  Pain Assessment  0-10 (Numeric) Pain Score:  (denies pain, endorses continued headaches however that she was already medicated and her pain is a \"0\")    Objective   Cognition:  Overall Cognitive Status: Within Functional Limits  Orientation Level: Oriented X4  Insight: Within function limits  Impulsive: Within functional limits           Home Living:  Type of Home: Apartment  Lives With: Spouse  Home Adaptive Equipment: Cane  Home Layout: One level  Home Access: Level entry  Bathroom Shower/Tub: Tub/shower unit  Prior Function:  Level of [place]: Independent with ADLs and functional transfers, Independent with homemaking with ambulation  Receives Help From:  (reports  is able to assist if necessary)  ADL Assistance: Independent  Homemaking Assistance: Independent  Ambulatory Assistance: Independent  Vocational: Unemployed, On disability (pt with double kidney transplant)  Leisure: enjoys riding horses and taking care of barn  Hand Dominance: Ambidextrous  Prior Function Comments: -falls    IADL History:  Current License: Yes  Mode of Transportation: Car    ADL:  Eating Assistance: Independent  Eating Deficit: Beverage management (denies issues with self " feeding, endorses ability to setup tray and open packages)  Grooming Assistance: Independent (anticipate)  Bathing Assistance: Stand by (anticipate)  UE Dressing Assistance: Independent (anticipate)  LE Dressing Assistance:  (distant supervision)  LE Dressing Deficit: Don/doff R sock, Don/doff L sock (seated EOB, semi-figure4 sitting no difficulty noted)  Toileting Assistance with Device:  (anticipate IND, utilizing purewick)    Activity Tolerance:  Endurance: Endurance does not limit participation in activity    Bed Mobility/Transfers: Bed Mobility  Bed Mobility: Yes  Bed Mobility 1  Bed Mobility 1: Supine to sitting, Sitting to supine  Level of Assistance 1: Distant supervision  Bed Mobility Comments 1: HOB raised, self reports significant improvement and ease since admission    Transfers  Transfer: Yes  Transfer 1  Technique 1: Sit to stand, Stand to sit  Transfer Level of Assistance 1: Close supervision  Trials/Comments 1: from EOB      Functional Mobility:  Functional Mobility  Functional Mobility Performed: Yes  Functional Mobility 1  Comments 1: facilitated moderate household distances without AD with SUP/SBA, no acute LOB      Vision:Vision - Basic Assessment  Current Vision: Wears glasses all the time  Patient Visual Report:  (pt reports  prescription which is now exacerbated by symptoms, reports difficulty affording new glasses; endorses dizziness related to visual deficits)    Sensation:  Light Touch: No apparent deficits    Strength:  Strength Comments: BUE 4+/5    Perception:  Inattention/Neglect: Appears intact  Initiation: Appears intact  Motor Planning: Appears intact  Perseveration: Not present    Coordination:  Movements are Fluid and Coordinated: Yes  Finger to Nose: Intact  Rapid Alternating Movements: Intact  Finger to Target: Intact  Coordination Comment: finger opposition WFL     Hand Function:  Gross Grasp: Functional  Coordination: Functional    Extremities:   RUE   RUE : Within  Functional Limits and LUE   LUE: Within Functional Limits    Outcome Measures:[facility] Daily Activity  Putting on and taking off regular lower body clothing: A little  Bathing (including washing, rinsing, drying): A little  Putting on and taking off regular upper body clothing: None  Toileting, which includes using toilet, bedpan or urinal: A little  Taking care of personal grooming such as brushing teeth: None  Eating Meals: None  Daily Activity - Total Score: 21     and Brief Confusion Assessment Method (bCAM)  CAM Result: CAM -    Education Documentation  Body Mechanics, taught by Di Merino OT at 12/19/2024 12:31 PM.  Learner: Patient  Readiness: Acceptance  Method: Explanation  Response: Verbalizes Understanding    Precautions, taught by Di Merino OT at 12/19/2024 12:31 PM.  Learner: Patient  Readiness: Acceptance  Method: Explanation  Response: Verbalizes Understanding    ADL Training, taught by Di Merino OT at 12/19/2024 12:31 PM.  Learner: Patient  Readiness: Acceptance  Method: Explanation  Response: Verbalizes Understanding    Education Comments  No comments found.      12/19/24 at 12:41 PM   Di Merino OT   Rehab Office: [phone]

## 2024-12-19 NOTE — PROGRESS NOTES
12/19/24 1142   Discharge Planning   Living Arrangements Spouse/significant other   Support Systems Spouse/significant other   Type of Residence Private residence   Home or Post Acute Services In home services   Type of Home Care Services Home PT   Expected Discharge Disposition Home H   Does the patient need discharge transport arranged? No   Financial Resource Strain   How hard is it for you to pay for the very basics like food, housing, medical care, and heating? Not very   Housing Stability   In the last 12 months, was there a time when you were not able to pay the mortgage or rent on time? Y   In the past 12 months, how many times have you moved where you were living? 0   At any time in the past 12 months, were you homeless or living in a shelter (including now)? N   Transportation Needs   In the past 12 months, has lack of transportation kept you from medical appointments or from getting medications? no   In the past 12 months, has lack of transportation kept you from meetings, work, or from getting things needed for daily living? No       Transitional Care Coordination Progress Note:  Patient discussed during interdisciplinary rounds.   Team members present: MD and TCC  Plan per Medical/Surgical team: Per team, patient will discharge today. PT is recommending low intensity therapy and patient is agreeable. No other discharge needs stated. Spouse will provide transport home.  Payor: Medicare   Discharge disposition: [facility]  Potential Barriers: none  ADOD: 12/19/24

## 2024-12-19 NOTE — DISCHARGE INSTRUCTIONS
Discharge Instructions    Dear Viki Diez,    You were admitted to [facility] [place] following 4-5 days of left-sided weakness. We obtained an MRI and a CT scan of your head and neck vessels and learned you had a small stroke in the right side of your brain in an area called the fredo. Transplant nephrology recommended continuing the immunosuppressive meds while you were admitted. The stroke neurology team started you on dual antiplatelet therapy for 21 days (aspirin and plavix) followed by life-long aspirin for secondary stroke prevention. They would like to follow-up with you in their clinic outpatient.    Medication changes: please review this paperwork carefully  Continue taking aspirin 81mg once daily and Plavix 75mg once daily for 21 days total starting 12/19; continue only the aspirin following that  Use Tylenol and Compazine 10mg as needed for headaches/migraines  Take Mag-Ox (magnesium, 400mg by mouth 2 times a day)  Continue taking all other meds as prescribed  If you would like to  your medications from another pharmacy, ask your pharmacy to contact [facility] to transfer over the prescriptions    Appointments/Follow-Up:     We have requested an automated system to call you to schedule; however if you do not hear from them in 3 days, please call [facility] appointment line: [phone] or [phone] to make the appointment yourself    We placed a referral to home PT for them to come work with you to regain your strength  The neurology team will set up outpatient follow-up in their stroke clinic  Please call and schedule appointment with your primary care doctor within 2 week, tell them that patient was recently admitted to the hospital.   If you need to establish with new primary care doctor, please call and schedule an appointment with [facility]: phone: [phone]    Lab work needed: No need to make appointment or have prescription. Please go  to any  labs to complete below    None    It was a pleasure taking care of you,  Your  Care Team

## 2024-12-19 NOTE — NURSING NOTE
Patient arrived to the floor approximately at 2057.  Patient is alert and oriented x4, afebrile, no c/o pain, no sob.  Patients bed is in the lowest position side rails up x 2.  Patient's call light is with in reach.  Patient currently using a purewick due to urgency.

## 2024-12-20 NOTE — DISCHARGE SUMMARY
Discharge Diagnosis  Right pontine stroke (Multi)    Issues Requiring Follow-Up  Stroke follow-up    Discharge Meds     Medication List      START taking these medications     acetaminophen 325 mg tablet; Commonly known as: Tylenol; Take 2 tablets   (650 mg) by mouth every 8 hours if needed for mild pain (1 - 3), moderate   pain (4 - 6) or headaches.   aspirin 81 mg chewable tablet; Chew 1 tablet (81 mg) once daily.   atorvastatin 80 mg tablet; Commonly known as: Lipitor; Take 1 tablet (80   mg) by mouth once daily.   clopidogrel 75 mg tablet; Commonly known as: Plavix; Take 1 tablet (75   mg) by mouth once daily.   prochlorperazine 10 mg tablet; Commonly known as: Compazine; Take 1   tablet (10 mg) by mouth every 6 hours if needed for nausea or vomiting.     CHANGE how you take these medications     amLODIPine 5 mg tablet; Commonly known as: Norvasc; Take 1 tablet (5 mg)   by mouth once daily.; What changed: when to take this   magnesium oxide 400 mg (241.3 mg magnesium) tablet; Commonly known as:   Mag-Ox; Take 1 tablet (400 mg) by mouth 2 times a day.; What changed:   medication strength   omeprazole 20 mg DR capsule; Commonly known as: PriLOSEC; Take 1 capsule   (20 mg) by mouth once daily in the morning. Take before meals.; What   changed: when to take this     CONTINUE taking these medications     * FLUoxetine 20 mg capsule; Commonly known as: PROzac; Take 1 capsule   (20 mg) by mouth once daily. At noon   * FLUoxetine 40 mg capsule; Commonly known as: PROzac; Take 1 capsule   (40 mg) by mouth once daily. At noon   lurasidone 40 mg tablet; Commonly known as: Latuda; Take 1 tablet (40   mg) by mouth once daily at noon. Take with meals.   mycophenolate 250 mg capsule; Commonly known as: Cellcept; Take 4   capsules (1,000 mg) by mouth every 12 hours.   ondansetron ODT 4 mg disintegrating tablet; Commonly known as:   Zofran-ODT   tacrolimus 1 mg capsule; Commonly known as: Prograf   traZODone 100 mg tablet;  Commonly known as: Desyrel; Take 2 tablets (200   mg) by mouth once daily at bedtime.  * This list has 2 medication(s) that are the same as other medications   prescribed for you. Read the directions carefully, and ask your doctor or   other care provider to review them with you.       Test Results Pending At Discharge  Pending Labs       No current pending labs.            Hospital Course  Viki Diez is a 59 y.o. female with  past medical history of HTN and obstructive uropathy/nephrolithiasis s/p kidney transplant (DDRT 2015) on mycophenolate and tacrolimus, PRUDENCIO, and Depression.  She presented to the ED with left sided motor weakness that started 4 to 5 days ago, CT brain without evidence of acute intracranial abnormality, no symptoms or signs suggestive of infectious etiology. She was admitted under medical care for further workup and evaluation.  Patient is currently hemodynamically stable well-appearing, with no infectious signs and symptoms; antibiotics for infectious etiology were not were not started but low threshold if c/f infection or signs of hemodynamic compromise.  Hyponatremia improving on repeat labs.  Will consult neuro and transplant nephrology to coordinate care while admitted.     Neuro recommended CTA H/N, TTE with bubble study, continue DAPT, start atorva 80, and will plan for outpt stroke clinic followup. CTA with notable stenosis of R carotid bifurcation (80%). No acute intervention indicated per neuro. Still pending TTE as of 12/18. Transplant nephro following, recommended cont immunosuppressive meds and tacro trough levels. Their team preferred to monitor her for another day with ADOD 12/19. Echo completed on 12/18 was grossly normal but with decreased sensitivity for intracardiac shunt. Discharge recs from neurology included continuing DAPT for 21d followed by ASA monotherapy, stroke outpatient follow-up, and to cont atorvastatin 80mg. She was discharge home on 12/19.    Pertinent  Physical Exam At Time of Discharge  Gen: well appearing, A+Ox3, in no acute distress  HEENT: sclera anicteric, no conjunctival injection, MMM  Resp: CTAB, normal breath sounds, no wheezing/crackles  CV: RRR, normal S1/S2, no murmurs  GI: non-tender, non-distended, no rebound or guarding  Extremities/MSK: warm and well perfused, no edema bilateral  Neuro: A+Ox3, LUE 4/5, LLE 4/5 strength, sensation intact b/l; no focal CN deficit, normal cerebellar exam; gait not assessed  Skin: warm and dry, no lesions, no rashes       Outpatient Follow-Up  Future Appointments   Date Time Provider Department Center   1/16/2025  2:00 PM Gertrude Galeana, APRN-CNP, APRN-CNS [id number] Academic         Apryl Rowe MD

## 2024-12-30 ENCOUNTER — TELEPHONE (OUTPATIENT)
Facility: HOSPITAL | Age: 59
End: 2024-12-30
Payer: MEDICARE

## 2024-12-30 DIAGNOSIS — Z94.0 KIDNEY REPLACED BY TRANSPLANT (HHS-HCC): ICD-10-CM

## 2024-12-30 RX ORDER — TACROLIMUS 1 MG/1
2 CAPSULE ORAL EVERY 12 HOURS
Qty: 120 CAPSULE | Refills: 11 | Status: SHIPPED | OUTPATIENT
Start: 2024-12-30 | End: 2025-12-30

## 2024-12-30 NOTE — TELEPHONE ENCOUNTER
Patient called to request refills of the below medication(s) and dose(s).  Please send prescription for the checked items below to       [id number] - [place], [place] - [address]  [address] [place] [address]  Phone: [phone] Fax: [phone]             mycophenolate (Cellcept) 250 mg capsule

## 2024-12-30 NOTE — TELEPHONE ENCOUNTER
[id number] - [place], [place] - [address]  [address]  [place] [address]  Phone: [phone] Fax: [phone]             tacrolimus (Prograf) 1 mg capsule Take 2 capsules (2 mg) by mouth every 12 hours.

## 2025-01-03 ENCOUNTER — TELEPHONE (OUTPATIENT)
Dept: HOME HEALTH SERVICES | Facility: HOME HEALTH | Age: 60
End: 2025-01-03
Payer: MEDICARE

## 2025-01-03 DIAGNOSIS — I63.50 RIGHT PONTINE STROKE (MULTI): Primary | ICD-10-CM

## 2025-01-03 NOTE — TELEPHONE ENCOUNTER
Please call patient and see if she needs anything from me, i.e., follow up appt or is she following up with neurology and transplant team?

## 2025-01-03 NOTE — TELEPHONE ENCOUNTER
FYI patient was referred to [facility] for PT upon hospital discharge however declined services when contacted for scheduling. Please ensure patient has a hospital follow up scheduled as they will not be monitored by home care.

## 2025-01-16 ENCOUNTER — APPOINTMENT (OUTPATIENT)
Dept: BEHAVIORAL HEALTH | Facility: CLINIC | Age: 60
End: 2025-01-16
Payer: MEDICARE

## 2025-02-05 DIAGNOSIS — Z94.0 KIDNEY REPLACED BY TRANSPLANT (HHS-HCC): ICD-10-CM

## 2025-02-05 RX ORDER — TACROLIMUS 1 MG/1
2 CAPSULE ORAL EVERY 12 HOURS
Qty: 120 CAPSULE | Refills: 11 | Status: SHIPPED | OUTPATIENT
Start: 2025-02-05 | End: 2026-02-05

## 2025-02-10 ENCOUNTER — APPOINTMENT (OUTPATIENT)
Dept: BEHAVIORAL HEALTH | Facility: CLINIC | Age: 60
End: 2025-02-10
Payer: MEDICARE

## 2025-03-18 DIAGNOSIS — F33.8 OTHER RECURRENT DEPRESSIVE DISORDERS: ICD-10-CM

## 2025-03-18 DIAGNOSIS — G47.00 INSOMNIA, UNSPECIFIED TYPE: ICD-10-CM

## 2025-03-18 RX ORDER — LURASIDONE HYDROCHLORIDE 40 MG/1
TABLET, FILM COATED ORAL
Qty: 30 TABLET | Refills: 0 | Status: SHIPPED | OUTPATIENT
Start: 2025-03-18

## 2025-03-18 RX ORDER — TRAZODONE HYDROCHLORIDE 100 MG/1
TABLET ORAL
Qty: 60 TABLET | Refills: 0 | Status: SHIPPED | OUTPATIENT
Start: 2025-03-18

## 2025-03-26 DIAGNOSIS — F33.8 OTHER RECURRENT DEPRESSIVE DISORDERS: ICD-10-CM

## 2025-03-26 RX ORDER — FLUOXETINE HYDROCHLORIDE 40 MG/1
40 CAPSULE ORAL DAILY
Qty: 30 CAPSULE | Refills: 0 | Status: SHIPPED | OUTPATIENT
Start: 2025-03-26

## 2025-04-18 DIAGNOSIS — G47.00 INSOMNIA, UNSPECIFIED TYPE: ICD-10-CM

## 2025-04-18 DIAGNOSIS — F33.8 OTHER RECURRENT DEPRESSIVE DISORDERS: ICD-10-CM

## 2025-04-21 RX ORDER — LURASIDONE HYDROCHLORIDE 40 MG/1
TABLET, FILM COATED ORAL
Qty: 30 TABLET | Refills: 0 | Status: SHIPPED | OUTPATIENT
Start: 2025-04-21

## 2025-04-21 RX ORDER — TRAZODONE HYDROCHLORIDE 100 MG/1
200 TABLET ORAL NIGHTLY
Qty: 60 TABLET | Refills: 0 | Status: SHIPPED | OUTPATIENT
Start: 2025-04-21

## 2025-04-24 DIAGNOSIS — F33.8 OTHER RECURRENT DEPRESSIVE DISORDERS: ICD-10-CM

## 2025-04-24 RX ORDER — FLUOXETINE HYDROCHLORIDE 40 MG/1
40 CAPSULE ORAL DAILY
Qty: 30 CAPSULE | Refills: 0 | Status: SHIPPED | OUTPATIENT
Start: 2025-04-24

## 2025-05-23 DIAGNOSIS — F33.8 OTHER RECURRENT DEPRESSIVE DISORDERS: ICD-10-CM

## 2025-05-23 DIAGNOSIS — G47.00 INSOMNIA, UNSPECIFIED TYPE: ICD-10-CM

## 2025-05-27 RX ORDER — LURASIDONE HYDROCHLORIDE 40 MG/1
TABLET, FILM COATED ORAL
Qty: 30 TABLET | Refills: 0 | Status: SHIPPED | OUTPATIENT
Start: 2025-05-27

## 2025-05-27 RX ORDER — FLUOXETINE HYDROCHLORIDE 40 MG/1
40 CAPSULE ORAL DAILY
Qty: 30 CAPSULE | Refills: 0 | Status: SHIPPED | OUTPATIENT
Start: 2025-05-27

## 2025-05-27 RX ORDER — TRAZODONE HYDROCHLORIDE 100 MG/1
200 TABLET ORAL NIGHTLY
Qty: 60 TABLET | Refills: 0 | Status: SHIPPED | OUTPATIENT
Start: 2025-05-27

## 2025-06-07 ENCOUNTER — APPOINTMENT (OUTPATIENT)
Dept: RADIOLOGY | Facility: HOSPITAL | Age: 60
DRG: 699 | End: 2025-06-07
Payer: MEDICARE

## 2025-06-07 ENCOUNTER — CLINICAL SUPPORT (OUTPATIENT)
Dept: EMERGENCY MEDICINE | Facility: HOSPITAL | Age: 60
DRG: 699 | End: 2025-06-07
Payer: MEDICARE

## 2025-06-07 ENCOUNTER — HOSPITAL ENCOUNTER (INPATIENT)
Facility: HOSPITAL | Age: 60
End: 2025-06-07
Attending: EMERGENCY MEDICINE | Admitting: INTERNAL MEDICINE
Payer: MEDICARE

## 2025-06-07 DIAGNOSIS — I10 HYPERTENSION, UNSPECIFIED TYPE: ICD-10-CM

## 2025-06-07 DIAGNOSIS — N39.0 COMPLICATED UTI (URINARY TRACT INFECTION): ICD-10-CM

## 2025-06-07 DIAGNOSIS — G44.89 OTHER HEADACHE SYNDROME: ICD-10-CM

## 2025-06-07 DIAGNOSIS — N39.0 ACUTE LOWER UTI: ICD-10-CM

## 2025-06-07 DIAGNOSIS — R51.9 ACUTE NONINTRACTABLE HEADACHE, UNSPECIFIED HEADACHE TYPE: ICD-10-CM

## 2025-06-07 DIAGNOSIS — Z94.0 KIDNEY REPLACED BY TRANSPLANT (HHS-HCC): ICD-10-CM

## 2025-06-07 DIAGNOSIS — Z94.0 HISTORY OF KIDNEY TRANSPLANT (HHS-HCC): ICD-10-CM

## 2025-06-07 DIAGNOSIS — E87.1 HYPONATREMIA: Primary | ICD-10-CM

## 2025-06-07 LAB
ALBUMIN SERPL BCP-MCNC: 4.1 G/DL (ref 3.4–5)
ALP SERPL-CCNC: 131 U/L (ref 33–110)
ALT SERPL W P-5'-P-CCNC: 8 U/L (ref 7–45)
ANION GAP SERPL CALC-SCNC: 16 MMOL/L (ref 10–20)
APPEARANCE UR: ABNORMAL
AST SERPL W P-5'-P-CCNC: 9 U/L (ref 9–39)
BACTERIA #/AREA URNS AUTO: ABNORMAL /HPF
BASOPHILS # BLD AUTO: 0.05 X10*3/UL (ref 0–0.1)
BASOPHILS NFR BLD AUTO: 0.3 %
BILIRUB SERPL-MCNC: 0.5 MG/DL (ref 0–1.2)
BILIRUB UR STRIP.AUTO-MCNC: NEGATIVE MG/DL
BUN SERPL-MCNC: 11 MG/DL (ref 6–23)
CALCIUM SERPL-MCNC: 9.9 MG/DL (ref 8.6–10.6)
CHLORIDE SERPL-SCNC: 95 MMOL/L (ref 98–107)
CHLORIDE UR-SCNC: <15 MMOL/L
CHLORIDE/CREATININE (MMOL/G) IN URINE: NORMAL
CO2 SERPL-SCNC: 20 MMOL/L (ref 21–32)
COLOR UR: YELLOW
CREAT SERPL-MCNC: 1.12 MG/DL (ref 0.5–1.05)
CREAT UR-MCNC: 274.3 MG/DL (ref 20–320)
EGFRCR SERPLBLD CKD-EPI 2021: 57 ML/MIN/1.73M*2
EOSINOPHIL # BLD AUTO: 0.02 X10*3/UL (ref 0–0.7)
EOSINOPHIL NFR BLD AUTO: 0.1 %
ERYTHROCYTE [DISTWIDTH] IN BLOOD BY AUTOMATED COUNT: 13 % (ref 11.5–14.5)
GLUCOSE SERPL-MCNC: 169 MG/DL (ref 74–99)
GLUCOSE UR STRIP.AUTO-MCNC: NORMAL MG/DL
HCT VFR BLD AUTO: 35.3 % (ref 36–46)
HGB BLD-MCNC: 12.7 G/DL (ref 12–16)
IMM GRANULOCYTES # BLD AUTO: 0.07 X10*3/UL (ref 0–0.7)
IMM GRANULOCYTES NFR BLD AUTO: 0.5 % (ref 0–0.9)
KETONES UR STRIP.AUTO-MCNC: ABNORMAL MG/DL
LEUKOCYTE ESTERASE UR QL STRIP.AUTO: ABNORMAL
LYMPHOCYTES # BLD AUTO: 1.54 X10*3/UL (ref 1.2–4.8)
LYMPHOCYTES NFR BLD AUTO: 10.5 %
MCH RBC QN AUTO: 30.1 PG (ref 26–34)
MCHC RBC AUTO-ENTMCNC: 36 G/DL (ref 32–36)
MCV RBC AUTO: 84 FL (ref 80–100)
MONOCYTES # BLD AUTO: 1.04 X10*3/UL (ref 0.1–1)
MONOCYTES NFR BLD AUTO: 7.1 %
MUCOUS THREADS #/AREA URNS AUTO: ABNORMAL /LPF
NEUTROPHILS # BLD AUTO: 11.98 X10*3/UL (ref 1.2–7.7)
NEUTROPHILS NFR BLD AUTO: 81.5 %
NITRITE UR QL STRIP.AUTO: NEGATIVE
NRBC BLD-RTO: 0 /100 WBCS (ref 0–0)
OSMOLALITY SERPL: 272 MOSM/KG (ref 280–300)
OSMOLALITY UR: 344 MOSM/KG (ref 200–1200)
PH UR STRIP.AUTO: 5.5 [PH]
PLATELET # BLD AUTO: 272 X10*3/UL (ref 150–450)
POTASSIUM SERPL-SCNC: 3.9 MMOL/L (ref 3.5–5.3)
POTASSIUM UR-SCNC: 68 MMOL/L
POTASSIUM/CREAT UR-RTO: 25 MMOL/G CREAT
PROT SERPL-MCNC: 7.4 G/DL (ref 6.4–8.2)
PROT UR STRIP.AUTO-MCNC: ABNORMAL MG/DL
RBC # BLD AUTO: 4.22 X10*6/UL (ref 4–5.2)
RBC # UR STRIP.AUTO: ABNORMAL MG/DL
RBC #/AREA URNS AUTO: >20 /HPF
SODIUM SERPL-SCNC: 127 MMOL/L (ref 136–145)
SODIUM UR-SCNC: 13 MMOL/L
SODIUM/CREAT UR-RTO: 5 MMOL/G CREAT
SP GR UR STRIP.AUTO: 1.03
SQUAMOUS #/AREA URNS AUTO: ABNORMAL /HPF
UROBILINOGEN UR STRIP.AUTO-MCNC: NORMAL MG/DL
WBC # BLD AUTO: 14.7 X10*3/UL (ref 4.4–11.3)
WBC #/AREA URNS AUTO: >50 /HPF

## 2025-06-07 PROCEDURE — 82570 ASSAY OF URINE CREATININE: CPT | Performed by: PHYSICIAN ASSISTANT

## 2025-06-07 PROCEDURE — 83930 ASSAY OF BLOOD OSMOLALITY: CPT | Performed by: PHYSICIAN ASSISTANT

## 2025-06-07 PROCEDURE — 2500000004 HC RX 250 GENERAL PHARMACY W/ HCPCS (ALT 636 FOR OP/ED): Performed by: PHYSICIAN ASSISTANT

## 2025-06-07 PROCEDURE — 83935 ASSAY OF URINE OSMOLALITY: CPT | Performed by: PHYSICIAN ASSISTANT

## 2025-06-07 PROCEDURE — 36415 COLL VENOUS BLD VENIPUNCTURE: CPT | Performed by: PHYSICIAN ASSISTANT

## 2025-06-07 PROCEDURE — 80053 COMPREHEN METABOLIC PANEL: CPT | Performed by: PHYSICIAN ASSISTANT

## 2025-06-07 PROCEDURE — 2500000001 HC RX 250 WO HCPCS SELF ADMINISTERED DRUGS (ALT 637 FOR MEDICARE OP)

## 2025-06-07 PROCEDURE — 99222 1ST HOSP IP/OBS MODERATE 55: CPT

## 2025-06-07 PROCEDURE — 2500000001 HC RX 250 WO HCPCS SELF ADMINISTERED DRUGS (ALT 637 FOR MEDICARE OP): Performed by: PHYSICIAN ASSISTANT

## 2025-06-07 PROCEDURE — 99285 EMERGENCY DEPT VISIT HI MDM: CPT | Mod: 25 | Performed by: EMERGENCY MEDICINE

## 2025-06-07 PROCEDURE — 70450 CT HEAD/BRAIN W/O DYE: CPT

## 2025-06-07 PROCEDURE — 85025 COMPLETE CBC W/AUTO DIFF WBC: CPT | Performed by: PHYSICIAN ASSISTANT

## 2025-06-07 PROCEDURE — 96375 TX/PRO/DX INJ NEW DRUG ADDON: CPT

## 2025-06-07 PROCEDURE — 96365 THER/PROPH/DIAG IV INF INIT: CPT

## 2025-06-07 PROCEDURE — 93010 ELECTROCARDIOGRAM REPORT: CPT

## 2025-06-07 PROCEDURE — 87077 CULTURE AEROBIC IDENTIFY: CPT | Performed by: PHYSICIAN ASSISTANT

## 2025-06-07 PROCEDURE — 1100000001 HC PRIVATE ROOM DAILY

## 2025-06-07 PROCEDURE — 96361 HYDRATE IV INFUSION ADD-ON: CPT

## 2025-06-07 PROCEDURE — 99285 EMERGENCY DEPT VISIT HI MDM: CPT | Performed by: PHYSICIAN ASSISTANT

## 2025-06-07 PROCEDURE — 2500000004 HC RX 250 GENERAL PHARMACY W/ HCPCS (ALT 636 FOR OP/ED)

## 2025-06-07 PROCEDURE — 70450 CT HEAD/BRAIN W/O DYE: CPT | Performed by: STUDENT IN AN ORGANIZED HEALTH CARE EDUCATION/TRAINING PROGRAM

## 2025-06-07 PROCEDURE — 93005 ELECTROCARDIOGRAM TRACING: CPT

## 2025-06-07 PROCEDURE — 81001 URINALYSIS AUTO W/SCOPE: CPT | Performed by: PHYSICIAN ASSISTANT

## 2025-06-07 PROCEDURE — 87086 URINE CULTURE/COLONY COUNT: CPT | Performed by: PHYSICIAN ASSISTANT

## 2025-06-07 PROCEDURE — 82436 ASSAY OF URINE CHLORIDE: CPT | Performed by: PHYSICIAN ASSISTANT

## 2025-06-07 RX ORDER — METOCLOPRAMIDE HYDROCHLORIDE 5 MG/ML
10 INJECTION INTRAMUSCULAR; INTRAVENOUS ONCE
Status: COMPLETED | OUTPATIENT
Start: 2025-06-07 | End: 2025-06-07

## 2025-06-07 RX ORDER — TACROLIMUS 1 MG/1
2 CAPSULE ORAL
Status: DISPENSED | OUTPATIENT
Start: 2025-06-07

## 2025-06-07 RX ORDER — ONDANSETRON 4 MG/1
4 TABLET, ORALLY DISINTEGRATING ORAL EVERY 8 HOURS PRN
Status: ACTIVE | OUTPATIENT
Start: 2025-06-07

## 2025-06-07 RX ORDER — MYCOPHENOLATE MOFETIL 250 MG/1
1000 CAPSULE ORAL EVERY 12 HOURS
Status: DISPENSED | OUTPATIENT
Start: 2025-06-07

## 2025-06-07 RX ORDER — METOCLOPRAMIDE HYDROCHLORIDE 5 MG/ML
5 INJECTION INTRAMUSCULAR; INTRAVENOUS ONCE
Status: COMPLETED | OUTPATIENT
Start: 2025-06-07 | End: 2025-06-07

## 2025-06-07 RX ORDER — CLOPIDOGREL BISULFATE 75 MG/1
75 TABLET ORAL DAILY
Status: DISCONTINUED | OUTPATIENT
Start: 2025-06-07 | End: 2025-06-08

## 2025-06-07 RX ORDER — PANTOPRAZOLE SODIUM 20 MG/1
20 TABLET, DELAYED RELEASE ORAL
Status: DISPENSED | OUTPATIENT
Start: 2025-06-08

## 2025-06-07 RX ORDER — CEFTRIAXONE 1 G/50ML
1 INJECTION, SOLUTION INTRAVENOUS ONCE
Status: COMPLETED | OUTPATIENT
Start: 2025-06-07 | End: 2025-06-07

## 2025-06-07 RX ORDER — DIPHENHYDRAMINE HYDROCHLORIDE 50 MG/ML
25 INJECTION, SOLUTION INTRAMUSCULAR; INTRAVENOUS ONCE
Status: COMPLETED | OUTPATIENT
Start: 2025-06-07 | End: 2025-06-07

## 2025-06-07 RX ORDER — POLYETHYLENE GLYCOL 3350 17 G/17G
17 POWDER, FOR SOLUTION ORAL DAILY
Status: DISPENSED | OUTPATIENT
Start: 2025-06-08

## 2025-06-07 RX ORDER — AMLODIPINE BESYLATE 5 MG/1
5 TABLET ORAL NIGHTLY
Status: DISCONTINUED | OUTPATIENT
Start: 2025-06-07 | End: 2025-06-08

## 2025-06-07 RX ORDER — TRAZODONE HYDROCHLORIDE 50 MG/1
200 TABLET ORAL NIGHTLY
Status: DISPENSED | OUTPATIENT
Start: 2025-06-07

## 2025-06-07 RX ORDER — MAGNESIUM SULFATE HEPTAHYDRATE 40 MG/ML
2 INJECTION, SOLUTION INTRAVENOUS ONCE
Status: COMPLETED | OUTPATIENT
Start: 2025-06-07 | End: 2025-06-07

## 2025-06-07 RX ORDER — ATORVASTATIN CALCIUM 80 MG/1
80 TABLET, FILM COATED ORAL NIGHTLY
Status: DISPENSED | OUTPATIENT
Start: 2025-06-07

## 2025-06-07 RX ORDER — FLUOXETINE HYDROCHLORIDE 40 MG/1
40 CAPSULE ORAL DAILY
Status: DISCONTINUED | OUTPATIENT
Start: 2025-06-07 | End: 2025-06-08

## 2025-06-07 RX ORDER — ACETAMINOPHEN 325 MG/1
975 TABLET ORAL ONCE
Status: COMPLETED | OUTPATIENT
Start: 2025-06-07 | End: 2025-06-07

## 2025-06-07 RX ORDER — ACETAMINOPHEN 10 MG/ML
1000 INJECTION, SOLUTION INTRAVENOUS ONCE
OUTPATIENT
Start: 2025-06-07 | End: 2025-06-07

## 2025-06-07 RX ORDER — FLUOXETINE 20 MG/1
20 CAPSULE ORAL DAILY
Status: DISCONTINUED | OUTPATIENT
Start: 2025-06-07 | End: 2025-06-07 | Stop reason: DRUGHIGH

## 2025-06-07 RX ORDER — METOCLOPRAMIDE HYDROCHLORIDE 5 MG/ML
10 INJECTION INTRAMUSCULAR; INTRAVENOUS EVERY 8 HOURS PRN
Status: DISPENSED | OUTPATIENT
Start: 2025-06-07

## 2025-06-07 RX ORDER — NAPROXEN SODIUM 220 MG/1
81 TABLET, FILM COATED ORAL DAILY
Status: DISPENSED | OUTPATIENT
Start: 2025-06-08

## 2025-06-07 RX ORDER — ENOXAPARIN SODIUM 100 MG/ML
40 INJECTION SUBCUTANEOUS EVERY 24 HOURS
Status: DISCONTINUED | OUTPATIENT
Start: 2025-06-07 | End: 2025-06-08

## 2025-06-07 RX ORDER — LURASIDONE HYDROCHLORIDE 40 MG/1
40 TABLET, FILM COATED ORAL
Status: DISPENSED | OUTPATIENT
Start: 2025-06-08

## 2025-06-07 RX ADMIN — AMLODIPINE BESYLATE 5 MG: 5 TABLET ORAL at 20:40

## 2025-06-07 RX ADMIN — DIPHENHYDRAMINE HYDROCHLORIDE 25 MG: 50 INJECTION INTRAMUSCULAR; INTRAVENOUS at 14:57

## 2025-06-07 RX ADMIN — TACROLIMUS 2 MG: 1 CAPSULE ORAL at 20:38

## 2025-06-07 RX ADMIN — SODIUM CHLORIDE 1000 ML: 0.9 INJECTION, SOLUTION INTRAVENOUS at 14:59

## 2025-06-07 RX ADMIN — CLOPIDOGREL BISULFATE 75 MG: 75 TABLET ORAL at 20:39

## 2025-06-07 RX ADMIN — MAGNESIUM SULFATE HEPTAHYDRATE 2 G: 40 INJECTION, SOLUTION INTRAVENOUS at 19:31

## 2025-06-07 RX ADMIN — ATORVASTATIN CALCIUM 80 MG: 80 TABLET, FILM COATED ORAL at 20:38

## 2025-06-07 RX ADMIN — MYCOPHENOLATE MOFETIL 1000 MG: 250 CAPSULE ORAL at 20:36

## 2025-06-07 RX ADMIN — TRAZODONE HYDROCHLORIDE 200 MG: 50 TABLET ORAL at 22:50

## 2025-06-07 RX ADMIN — METOCLOPRAMIDE 10 MG: 5 INJECTION, SOLUTION INTRAMUSCULAR; INTRAVENOUS at 19:27

## 2025-06-07 RX ADMIN — CEFTRIAXONE SODIUM 1 G: 1 INJECTION, SOLUTION INTRAVENOUS at 17:01

## 2025-06-07 RX ADMIN — METOCLOPRAMIDE 5 MG: 5 INJECTION, SOLUTION INTRAMUSCULAR; INTRAVENOUS at 14:58

## 2025-06-07 RX ADMIN — ENOXAPARIN SODIUM 40 MG: 100 INJECTION SUBCUTANEOUS at 20:36

## 2025-06-07 RX ADMIN — ACETAMINOPHEN 975 MG: 325 TABLET ORAL at 14:59

## 2025-06-07 ASSESSMENT — ENCOUNTER SYMPTOMS
MUSCULOSKELETAL NEGATIVE: 1
DYSURIA: 1
PSYCHIATRIC NEGATIVE: 1
HEMATURIA: 0
CONSTITUTIONAL NEGATIVE: 1
RESPIRATORY NEGATIVE: 1
ENDOCRINE NEGATIVE: 1
GASTROINTESTINAL NEGATIVE: 1
FLANK PAIN: 0
DIFFICULTY URINATING: 0
HEMATOLOGIC/LYMPHATIC NEGATIVE: 1
FREQUENCY: 1
CARDIOVASCULAR NEGATIVE: 1
EYES NEGATIVE: 1
HEADACHES: 1

## 2025-06-07 ASSESSMENT — LIFESTYLE VARIABLES
EVER HAD A DRINK FIRST THING IN THE MORNING TO STEADY YOUR NERVES TO GET RID OF A HANGOVER: NO
HAVE YOU EVER FELT YOU SHOULD CUT DOWN ON YOUR DRINKING: NO
TOTAL SCORE: 0
EVER FELT BAD OR GUILTY ABOUT YOUR DRINKING: NO
HAVE PEOPLE ANNOYED YOU BY CRITICIZING YOUR DRINKING: NO

## 2025-06-07 ASSESSMENT — PAIN - FUNCTIONAL ASSESSMENT
PAIN_FUNCTIONAL_ASSESSMENT: 0-10

## 2025-06-07 ASSESSMENT — PAIN DESCRIPTION - DESCRIPTORS
DESCRIPTORS: HEADACHE
DESCRIPTORS: THROBBING

## 2025-06-07 ASSESSMENT — PAIN SCALES - GENERAL
PAINLEVEL_OUTOF10: 9
PAINLEVEL_OUTOF10: 8
PAINLEVEL_OUTOF10: 10 - WORST POSSIBLE PAIN
PAINLEVEL_OUTOF10: 5 - MODERATE PAIN

## 2025-06-07 ASSESSMENT — PAIN DESCRIPTION - LOCATION
LOCATION: HEAD
LOCATION: HEAD

## 2025-06-07 ASSESSMENT — COLUMBIA-SUICIDE SEVERITY RATING SCALE - C-SSRS
6. HAVE YOU EVER DONE ANYTHING, STARTED TO DO ANYTHING, OR PREPARED TO DO ANYTHING TO END YOUR LIFE?: NO
2. HAVE YOU ACTUALLY HAD ANY THOUGHTS OF KILLING YOURSELF?: NO
1. IN THE PAST MONTH, HAVE YOU WISHED YOU WERE DEAD OR WISHED YOU COULD GO TO SLEEP AND NOT WAKE UP?: NO

## 2025-06-07 NOTE — H&P
History Of Present Illness  Viki Diez is a 60 y.o. female presenting with headache.     Viki Diez is a 59 y.o. female with  past medical history of HTN and obstructive uropathy/nephrolithiasis s/p kidney transplant (DDRT ) on mycophenolate and tacrolimus, PRUDENCIO, Depression, who admitted as a case of UTI\hyponatremia for management and work up.     Patient was in her usual status of health until 2 days ago, when she start recurrent headaches, and vomiting. She was not able to eat or drink because of vomiting.     Patient admit dysuria in the last month, however she did not seek medical care and have not been started on antibiotics.     Does not report any dysarthria, difficulty swallowing, any mouth deviation.  Denies any photophobia, neck rigidity, fever, neck pain or back pain, no abnormal movements, denies any flulike symptoms, or muscle aches.  No rashes or joint pain.  No chest pain, palpitation, shortness of breath or cough.  She denies having any previous similar episodes.  No GI or urinary symptoms.  No history of recent travel, no contact with sick patients.     In the ED:  Vital signs: Afebrile, heart rate 70s to 80s, on room air saturating 100%, blood pressure 150s/ 70s  Labs: WBC 14.2, hemoglobin 12.6, platelets 323  Sodium 129, potassium 4, bicarb 22, creatinine 0.7, BUN 14, ALT and AST 4/9  Imaging: No acute intracranial abnormality.   EKG: NSR  ED Course: Given 1 dose of Tylenol 975 mg, Reglan 5 mg once, 1 L of RL, 1 dose of CXZ, 25mg of diphenylenimine      Transplant Hx;  Primary Cause of Organ Disease: Congenital Obstructive Uropathy.   Donor/Transplant Type: A  donor renal transplant on: 9/3/2015,~A living donor renal transplant on: 10/25/2005.   Transplant Course: standard thymoglobulin induction,~No DGF,~No CMV mismatch~and~No EBV mismatch.   Rejections: Other: Antibody-mediated rejection in 2008, received IVIG treatment.Antibody-mediated rejection in 2007,  treated with IVIG and sirolimus therapy.    Infection: No episodes of infection.   Limitations: Not working due to arm/shoulder pain.   Subjective: Ms. Viki Diez is a 58-year-old female with a past medical history of hypertension and obstructive uropathy/nephrolithiasis s/p living related transplant in  which failed due to antibody mediated rejection and was later transplanted with a  kidney transplant on 9/3/2015.    Review of Systems   Constitutional: Negative.    HENT: Negative.     Eyes: Negative.    Respiratory: Negative.     Cardiovascular: Negative.    Gastrointestinal: Negative.    Endocrine: Negative.    Genitourinary:  Positive for decreased urine volume, dysuria, frequency, pelvic pain, urgency, vaginal bleeding, vaginal discharge and vaginal pain. Negative for difficulty urinating, dyspareunia, enuresis, flank pain, genital sores, hematuria and menstrual problem.   Musculoskeletal: Negative.    Neurological:  Positive for headaches.   Hematological: Negative.    Psychiatric/Behavioral: Negative.          Physical Exam  Constitutional:       Appearance: Normal appearance. She is normal weight.   HENT:      Head: Normocephalic and atraumatic.      Right Ear: Tympanic membrane, ear canal and external ear normal.      Left Ear: Tympanic membrane, ear canal and external ear normal.      Nose: Nose normal.      Mouth/Throat:      Mouth: Mucous membranes are moist.      Pharynx: Oropharynx is clear.   Eyes:      Extraocular Movements: Extraocular movements intact.      Conjunctiva/sclera: Conjunctivae normal.      Pupils: Pupils are equal, round, and reactive to light.   Cardiovascular:      Rate and Rhythm: Normal rate and regular rhythm.      Pulses: Normal pulses.      Heart sounds: Normal heart sounds.   Pulmonary:      Effort: Pulmonary effort is normal.      Breath sounds: Normal breath sounds.   Abdominal:      General: Abdomen is flat. Bowel sounds are normal.      Palpations: Abdomen  is soft.   Musculoskeletal:         General: Normal range of motion.      Cervical back: Normal range of motion and neck supple.   Skin:     General: Skin is warm.      Capillary Refill: Capillary refill takes less than 2 seconds.   Neurological:      General: No focal deficit present.      Mental Status: She is alert and oriented to person, place, and time. Mental status is at baseline.   Psychiatric:         Mood and Affect: Mood normal.         Behavior: Behavior normal.         Thought Content: Thought content normal.         Judgment: Judgment normal.          Last Recorded Vitals  BP (!) 157/98 (BP Location: Left arm, Patient Position: Lying)   Pulse 80   Temp 36.6 °C (97.9 °F) (Oral)   Resp 16   Wt 55.8 kg (123 lb)   SpO2 96%          Results from last 72 hours   Lab Units 06/07/25  1459   WBC AUTO x10*3/uL 14.7*   HEMOGLOBIN g/dL 12.7   MCV fL 84   MCH pg 30.1   MCHC g/dL 36.0   RDW % 13.0   PLATELETS AUTO x10*3/uL 272     Results from last 72 hours   Lab Units 06/07/25  1459   GLUCOSE mg/dL 169*   SODIUM mmol/L 127*   POTASSIUM mmol/L 3.9   CHLORIDE mmol/L 95*   CO2 mmol/L 20*   CREATININE mg/dL 1.12*   EGFR mL/min/1.73m*2 57*   CALCIUM mg/dL 9.9   ALBUMIN g/dL 4.1      @[id number](INR:7, PT:7, aPTT:7)@    CT head wo IV contrast  Result Date: 6/7/2025  Interpreted By:  Vick Marquez, STUDY: CT HEAD WO IV CONTRAST;  6/7/2025 4:12 pm   INDICATION: Signs/Symptoms:headache, worsening over the course of months.     COMPARISON: MRI brain dated 12/17/2024.   ACCESSION NUMBER(S): [id number]   ORDERING CLINICIAN: TIN HOLDEN   TECHNIQUE: Noncontrast axial CT scan of head was performed. Angled reformats in brain and bone windows were generated. The images were reviewed in bone, brain, blood and soft tissue windows.   FINDINGS: CSF Spaces: Mild generalized brain volume loss. No hydrocephalus.   Parenchyma: No CT evidence of acute territorial infarction or acute intracranial hemorrhage. No mass effect or  midline shift. Confluent areas of T2/FLAIR hyperintensities in the periventricular and subcortical white matter, compatible with moderate to severe chronic white matter small vessel ischemic changes. Chronic ischemic infarct in the right-sided fredo. Limited evaluation of the brainstem and cerebellum.   Calvarium: The calvarium is unremarkable.   Paranasal sinuses and mastoids: Visualized paranasal sinuses and mastoids are clear.       1. No signs of acute territorial infarction or acute intracranial hemorrhage. Please note, CT is insensitive to detect small acute ischemic infarct. MR of the brain without contrast is recommended for further assessment.   2. Moderate to severe chronic white matter small vessel ischemic changes. Chronic ischemic infarct in the right-sided fredo.       MACRO: None     Signed by: Vick Marquez 6/7/2025 4:16 PM Dictation workstation:   [id number]              Assessment and plan  Viki Diez is a 59 y.o. female with  past medical history of HTN and obstructive uropathy/nephrolithiasis s/p kidney transplant (DDRT 2015) on mycophenolate and tacrolimus, PRUDENCIO, Depression, admitted as a case of UTI in context of immunosuppression and transplant history, will treat with CXZ and follow urine culture. Regarding hyponatremia it seems hypovolemic hyponatremia and patient is asymptomatic, will encourage oral intake and will order further work up.       #UTI  #MIL  :: dysuria since one month, associated with urgency   :: exam shows suprapubic tenderness.   :: UA shows 500 LE, and negative nitrite, WBC 14.7 with ANC 11.98  :: Cr 1.12 ( baseline Cr 0.7) Urea: Cr ratio is consistent with pre-renal azotemia.   :: S\P 1 L of NS in ED    Plan:   - Treat with CXZ  - follow urine culture    - will obtain an ultrasound and will follow urine lyte.       # c\f hypovolemic Hyponatremia  :: Serum and urine osmolality ordered   - Accurate I&O  - Encourage oral intake        # HTN  #Hx of CVA  - Continue  Amlodipine  - Continue atorvastatin  - continue ASA  - continue plavix      # Obstructive uropathy/nephrolithiasis s/p kidney transplant (DDRT 2015) on mycophenolate and tacrolimus,   - Continue home dose of tacrolimus 2 mg twice daily, CellCept 1000 mg twice daily.  - Transplant nephrology consult in the AM.      # PRUDENCIO, Depression.  # Insomnia  :: On Prozac 60 mg daily, lurasidone 40 mg daily, trazodone 200 mg nightly  :: The patient's lurasidone dose was recently increased in the past few weeks from 20 mg to 40 mg           F: Replete PRN  N: Regular diet  A: PIV  O2: On RA  DVT ppx: hep SQ  GI ppx: -   Code Status: Full code  Contact: George Diez (Sibling)  [phone] (Work Phone)            Nany Hawley MD

## 2025-06-07 NOTE — ED TRIAGE NOTES
Pt presents to ED for headaches. Pt endorses vomiting, loss of appetite, states she believes she is dehydrated. Pt states her headache has been going on for weeks. Pt also concerned that she is unable to take her rejection meds at night d/t vomiting (states it is not as bad in the morning, she is able to keep them down for most part). Pt also endorses dizziness. Pt has hx of double kidney transplant (2008, 2015), CVA (12/24, states she had L sided weakness but is better).

## 2025-06-07 NOTE — SIGNIFICANT EVENT
"SENIOR STAFFING NOTE    Please refer to excellent PGY-1 history and physical note for further details.    HPI:  Viki Diez is a 60 y.o. female with PMHx of HTN, obstructive uropathy/nephrolithiasis s/p DDKT 2015 on MMF and tacro, CVA 12/2024, PRUDENCIO, and MDD who initially presented for headache and dysuria.     Clinical history:  She notes that since her stroke, she has had horrible migraines and for the past few days, they are associated with vomiting and she has not been able to keep much down. She also reports dysuria worst in the morning which slowly resolves throuhgout the day x1 month.    ED course notable for stable vitals without fever, labs with leukocytosis to 14.7, hyponatremia 127, Cr 1.12, UA with >50 WBC, 500 LE, 1+ bacteria.  S/p 1L NS, started on CTX. After discussion with Transplant Nephrology, was admitted to medicine.          Objective data:  Visit Vitals  /80   Pulse 100   Temp 36.1 °C (97 °F) (Temporal)   Resp 16   Ht 1.702 m (5' 7\")   Wt 55.8 kg (123 lb)   SpO2 100%   BMI 19.26 kg/m²   Smoking Status Former   BSA 1.62 m²        Constitutional: No acute distress, uncomfortable  Cardiovascular: RRR, normal S1/S2, no murmurs noted  Pulmonary: Clear to auscultation b/l, no wheezes/crackles/rhonchi, no increased work of breathing, no supplemental oxygen  GI: Soft, tender to palpation suprapubic  Lower extremities: Warm and well perfused, no lower extremity edema    Labs    Results from last 7 days   Lab Units 06/07/25  1459   WBC AUTO x10*3/uL 14.7*   HEMOGLOBIN g/dL 12.7   HEMATOCRIT % 35.3*   PLATELETS AUTO x10*3/uL 272            Results from last 7 days   Lab Units 06/07/25  1459   SODIUM mmol/L 127*   POTASSIUM mmol/L 3.9   CHLORIDE mmol/L 95*   CO2 mmol/L 20*   BUN mg/dL 11   CREATININE mg/dL 1.12*   CALCIUM mg/dL 9.9     Results from last 7 days   Lab Units 06/07/25  1459   ALK PHOS U/L 131*   BILIRUBIN TOTAL mg/dL 0.5   PROTEIN TOTAL g/dL 7.4   ALT U/L 8   AST U/L 9         "         Imaging  CT head wo IV contrast   Final Result   1. No signs of acute territorial infarction or acute intracranial   hemorrhage. Please note, CT is insensitive to detect small acute   ischemic infarct. MR of the brain without contrast is recommended for   further assessment.        2. Moderate to severe chronic white matter small vessel ischemic   changes. Chronic ischemic infarct in the right-sided fredo.                  MACRO:   None             Signed by: Vick Marquez 6/7/2025 4:16 PM   Dictation workstation:   [id number]              Assessment/Plan:  58 yo female with PMHx of HTN, obstructive uropathy/nephrolithiasis s/p DDKT 2015 on MMF and tacro, CVA 12/2024, PRUDENCIO, and MDD who initially presented for headache and dysuria, found to have asymptomatic acute on chronic hyponatremia, symptomatic UTI, and an MIL. Currently being volume resuscitated and on antibiotics.    #UTI  - Dysuria sx x1m  - UA with 500 LE, 1+ bacteria, >50 WBC  Plan:  - Ceftriaxone  - Fu Urine cx    #MIL  #hypoNa  - Likely hypovolemic given history of nausea and vomiting last few days associated with migraines  - Na baseline 133, Cr baseline 0.7, admitted at 127, 1.12, respectively  Plan:  -Monitor volume status  - Urine osm, lytes  - strict I&O    Rest of care per excellent PGY-1 H&P.      Fluids: PRN  Electrolytes: K>4, Mg>2  Nutrition: Reg  Access: PIV  DVT ppx: SQH pending MIL trajectory  GI ppx:  Home PPI  Code status:   Full (confirmed on admission)  NOK: George (sibling) [phone]    Patient to be staffed with attending in the AM.    Shaw Tam MD  Internal Medicine, PGY-2

## 2025-06-07 NOTE — ED PROVIDER NOTES
Emergency Department Provider Note        History of Present Illness     History provided by: Patient  Limitations to History: None  External Records Reviewed with Brief Summary: pmhx    HPI:  Viki Diez is a 60 y.o. female with history of bilateral kidney transplants most recent 1 approximately 10 years ago, previous stroke back in December, smoking history, anxiety, GERD, headaches, postherpetic neuralgia, rotator cuff tear, UTIs, gastroenteritis, nephrolithiasis, hypertension who presents today for evaluation of headache.  Patient states ever since she had her stroke back in December she is not having headaches, patient states initially they started out in the front of her head but now they are more on top, she states at first it was just once a week, then twice a week and now for the last 2 weeks has been every day, patient states that she has associated nausea and vomiting with the headaches, states that she has vomited 4 times this week at nights, she states that yesterday she vomited up her antirejection medications, she believes that she kept them down since she took them several hours before vomiting the last few days but patient is worried that with these headaches and vomiting that she may be harming her kidneys or getting dehydrated.  Patient is only having about 1 daily episode of vomiting, she does take Zofran at home but states that she never gets to it in time before she actually vomits.  She does state that she has chronic diarrhea, does not feel like she has a GI bug per se, she denies any suspicious food intake, sick contacts or recent travel or medication changes/recent antibiotics.  She does endorse feeling subjectively feverish and chilled the last 3 days, denies any neck stiffness, rashes, denies any URI symptoms.  She does endorse that for about 3 weeks has been having burning with urination in the early morning and then it subsides by the end of the night, patient states she has had UTIs  before but the fact that these go away she did not think it was a UTI.  Denies any abdominal pain or back pain or flank pain.  She states that she was post to follow-up with her neurologist after the stroke but states that they only do telemedicine and the last time she had an appointment she tried to connect and her microphone in her video camera was not connecting and so she did not end up getting to have her appointment.  She states she has some chronic left-sided visual disturbances from her previous stroke but otherwise no real deficits, states she did have some transient weakness in her left arm and left leg back when she had the stroke initially but that is not ongoing anymore.    Physical Exam   Triage vitals:  T 36.1 °C (97 °F)    /80  RR 16  O2 100 % None (Room air)    Physical Exam  Vitals and nursing note reviewed.   Constitutional:       General: She is not in acute distress.     Appearance: Normal appearance. She is not toxic-appearing.   HENT:      Head: Normocephalic and atraumatic.      Nose: Nose normal.   Eyes:      General: No visual field deficit.     Extraocular Movements: Extraocular movements intact.   Cardiovascular:      Rate and Rhythm: Normal rate and regular rhythm.   Pulmonary:      Effort: Pulmonary effort is normal.   Abdominal:      Palpations: Abdomen is soft.      Comments: No abdominal or CVA tenderness.   Musculoskeletal:         General: Normal range of motion.      Cervical back: Normal range of motion and neck supple.   Skin:     General: Skin is warm and dry.   Neurological:      General: No focal deficit present.      Mental Status: She is alert and oriented to person, place, and time.      GCS: GCS eye subscore is 4. GCS verbal subscore is 5. GCS motor subscore is 6.      Cranial Nerves: Cranial nerves 2-12 are intact. No cranial nerve deficit, dysarthria or facial asymmetry.      Sensory: Sensation is intact. No sensory deficit.      Motor: Motor function  is intact. No weakness, tremor, seizure activity or pronator drift.      Coordination: Coordination is intact. Finger-Nose-Finger Test normal. Rapid alternating movements normal.   Psychiatric:         Mood and Affect: Mood normal.         Thought Content: Thought content normal.        CT head wo IV contrast   Final Result   1. No signs of acute territorial infarction or acute intracranial   hemorrhage. Please note, CT is insensitive to detect small acute   ischemic infarct. MR of the brain without contrast is recommended for   further assessment.        2. Moderate to severe chronic white matter small vessel ischemic   changes. Chronic ischemic infarct in the right-sided fredo.                  MACRO:   None             Signed by: Vick Marquez 6/7/2025 4:16 PM   Dictation workstation:   [id number]        Labs Reviewed   CBC WITH AUTO DIFFERENTIAL - Abnormal       Result Value    WBC 14.7 (*)     nRBC 0.0      RBC 4.22      Hemoglobin 12.7      Hematocrit 35.3 (*)     MCV 84      MCH 30.1      MCHC 36.0      RDW 13.0      Platelets 272      Neutrophils % 81.5      Immature Granulocytes %, Automated 0.5      Lymphocytes % 10.5      Monocytes % 7.1      Eosinophils % 0.1      Basophils % 0.3      Neutrophils Absolute 11.98 (*)     Immature Granulocytes Absolute, Automated 0.07      Lymphocytes Absolute 1.54      Monocytes Absolute 1.04 (*)     Eosinophils Absolute 0.02      Basophils Absolute 0.05     COMPREHENSIVE METABOLIC PANEL - Abnormal    Glucose 169 (*)     Sodium 127 (*)     Potassium 3.9      Chloride 95 (*)     Bicarbonate 20 (*)     Anion Gap 16      Urea Nitrogen 11      Creatinine 1.12 (*)     eGFR 57 (*)     Calcium 9.9      Albumin 4.1      Alkaline Phosphatase 131 (*)     Total Protein 7.4      AST 9      Bilirubin, Total 0.5      ALT 8     URINALYSIS WITH REFLEX CULTURE AND MICROSCOPIC - Abnormal    Color, Urine Yellow      Appearance, Urine Turbid (*)     Specific Gravity, Urine 1.026      pH,  Urine 5.5      Protein, Urine 300 (3+) (*)     Glucose, Urine Normal      Blood, Urine 0.2 (2+) (*)     Ketones, Urine TRACE (*)     Bilirubin, Urine NEGATIVE      Urobilinogen, Urine Normal      Nitrite, Urine NEGATIVE      Leukocyte Esterase, Urine 500 Alcides/uL (*)    MICROSCOPIC ONLY, URINE - Abnormal    WBC, Urine >50 (*)     RBC, Urine >20 (*)     Squamous Epithelial Cells, Urine 10-25 (FEW)      Bacteria, Urine 1+ (*)     Mucus, Urine 1+     OSMOLALITY - Abnormal    Osmolality, Serum 272 (*)    OSMOLALITY, URINE - Normal    Osmolality, Urine Random 344     URINE CULTURE   ELECTROLYTE PANEL, URINE    Sodium, Urine Random 13      Sodium/Creatinine Ratio 5      Potassium, Urine Random 68      Potassium/Creatinine Ratio 25      Chloride, Urine Random <15      Chloride/Creatinine Ratio        Creatinine, Urine Random 274.3     URINALYSIS WITH REFLEX CULTURE AND MICROSCOPIC    Narrative:     The following orders were created for panel order Urinalysis with Reflex Culture and Microscopic.  Procedure                               Abnormality         Status                     ---------                               -----------         ------                     Urinalysis with Reflex C...[[id number]]  Abnormal            Final result               Extra Urine Gray Tube[[id number]]                            In process                   Please view results for these tests on the individual orders.   EXTRA URINE GRAY TUBE     ED Course as of 06/07/25 1748   Sat Jun 07, 2025   1718 2nd page out to transplant nephrology []   1738 Spoke with Dr Sidhu with transplant nephrology, she stated patient can be admitted to medicine and they will consult. []      ED Course User Index  [] Kaylie Geller PA-C         Diagnoses as of 06/07/25 1748   Hyponatremia   Acute nonintractable headache, unspecified headache type   Complicated UTI (urinary tract infection)   History of kidney transplant ([facility])         Medical Decision  Making & ED Course   Medical Decision Makin y.o. female presents today for evaluation of a headache that is been going on since December when she had her stroke, its across the top of her head, she has had no trauma, no meningeal signs, no nuchal rigidity, no rashes, she is a normal nonfocal neurological examination.  I did order a CT of the head in addition to labs to evaluate for any signs of dehydration, MIL.  I also obtained a urinalysis given that patient complains of some burning with urination in the mornings.    CT head is without acute abnormality, CBC did show a white count of 14.7, CMP showed a creatinine of 1.12, patient did receive a liter of fluids, her sodium 142 was 127, she has been hyponatremic in the past but this is lower than previously, she typically runs around 133-134, she denies any chronic alcohol use, states she only drinks occasionally.  She also has 500 leukocyte esterase with greater than 50 white blood cells and 1+ bacteria in her urine, given that she is symptomatic and had kidney transplants, we will plan to admit, she was treated with Rocephin.  I did discuss case with transplant nephrologist on-call, Dr Sidhu who recommended admission to medicine and she will consult if need be.  ----      Differential diagnoses considered include but are not limited to: Infection, UTI, MIL, dehydration, viral     Social Determinants of Health which Significantly Impact Care: None identified     EKG Independent Interpretation: EKG not obtained    Independent Result Review and Interpretation: Relevant laboratory and radiographic results were reviewed and independently interpreted by myself.  As necessary, they are commented on in the ED Course.    Chronic conditions affecting the patient's care: As documented above in [place]    The patient was discussed with the following consultants/services: Dr. Sidhu transplant nephrology    Care Considerations: As documented above in [place]    ED Course:  ED  Course as of 06/07/25 1748   Sat Jun 07, 2025   1718 2nd page out to transplant nephrology []   1738 Spoke with Dr Sidhu with transplant nephrology, she stated patient can be admitted to medicine and they will consult. []      ED Course User Index  [] Kaylie Geller PA-C         Diagnoses as of 06/07/25 1748   Hyponatremia   Acute nonintractable headache, unspecified headache type   Complicated UTI (urinary tract infection)   History of kidney transplant ([facility])     Disposition   As a result of their workup, the patient will require admission to the hospital.  The patient was informed of her diagnosis.  The patient was given the opportunity to ask questions and I answered them. The patient agreed to be admitted to the hospital.    Procedures   Procedures    This was a shared visit with an ED attending.  The patient was seen and discussed with the ED attending    Kaylie Geller PA-C  Emergency Medicine       Kaylie Geller PA-C  06/07/25 1747     "ED with headache, fatigue, low grade temps   \"Just not feeling well\"    Exam:   Awake clear speech  Supple neck  No rashes  Mild abdominal tenderness    MDM:   [] send cultures, swabs, UA  [] gentle fluids and analgesia  [] will d/w transplant but anticipate admission              I independently interpreted patient's EKG: sinus rhythm, nl intervals, no acute st changes         MD Gisela Leahy MD  06/15/25 7130    "

## 2025-06-08 ENCOUNTER — APPOINTMENT (OUTPATIENT)
Dept: RADIOLOGY | Facility: HOSPITAL | Age: 60
DRG: 699 | End: 2025-06-08
Payer: MEDICARE

## 2025-06-08 VITALS
DIASTOLIC BLOOD PRESSURE: 92 MMHG | SYSTOLIC BLOOD PRESSURE: 134 MMHG | HEART RATE: 69 BPM | OXYGEN SATURATION: 98 % | BODY MASS INDEX: 19.3 KG/M2 | RESPIRATION RATE: 16 BRPM | TEMPERATURE: 97.3 F | WEIGHT: 123 LBS | HEIGHT: 67 IN

## 2025-06-08 LAB
ALBUMIN SERPL BCP-MCNC: 3.6 G/DL (ref 3.4–5)
ALBUMIN SERPL BCP-MCNC: 4 G/DL (ref 3.4–5)
ANION GAP SERPL CALC-SCNC: 13 MMOL/L (ref 10–20)
ANION GAP SERPL CALC-SCNC: 15 MMOL/L (ref 10–20)
ATRIAL RATE: 104 BPM
BASOPHILS # BLD AUTO: 0.06 X10*3/UL (ref 0–0.1)
BASOPHILS NFR BLD AUTO: 0.5 %
BUN SERPL-MCNC: 10 MG/DL (ref 6–23)
BUN SERPL-MCNC: 14 MG/DL (ref 6–23)
CALCIUM SERPL-MCNC: 10.2 MG/DL (ref 8.6–10.6)
CALCIUM SERPL-MCNC: 9.9 MG/DL (ref 8.6–10.6)
CHLORIDE SERPL-SCNC: 94 MMOL/L (ref 98–107)
CHLORIDE SERPL-SCNC: 95 MMOL/L (ref 98–107)
CO2 SERPL-SCNC: 19 MMOL/L (ref 21–32)
CO2 SERPL-SCNC: 22 MMOL/L (ref 21–32)
CREAT SERPL-MCNC: 1.04 MG/DL (ref 0.5–1.05)
CREAT SERPL-MCNC: 1.12 MG/DL (ref 0.5–1.05)
EGFRCR SERPLBLD CKD-EPI 2021: 57 ML/MIN/1.73M*2
EGFRCR SERPLBLD CKD-EPI 2021: 62 ML/MIN/1.73M*2
EOSINOPHIL # BLD AUTO: 0.05 X10*3/UL (ref 0–0.7)
EOSINOPHIL NFR BLD AUTO: 0.4 %
ERYTHROCYTE [DISTWIDTH] IN BLOOD BY AUTOMATED COUNT: 13.3 % (ref 11.5–14.5)
GLUCOSE SERPL-MCNC: 143 MG/DL (ref 74–99)
GLUCOSE SERPL-MCNC: 167 MG/DL (ref 74–99)
HCT VFR BLD AUTO: 37.2 % (ref 36–46)
HGB BLD-MCNC: 12.1 G/DL (ref 12–16)
HOLD SPECIMEN: NORMAL
IMM GRANULOCYTES # BLD AUTO: 0.05 X10*3/UL (ref 0–0.7)
IMM GRANULOCYTES NFR BLD AUTO: 0.4 % (ref 0–0.9)
LYMPHOCYTES # BLD AUTO: 2.2 X10*3/UL (ref 1.2–4.8)
LYMPHOCYTES NFR BLD AUTO: 17.4 %
MAGNESIUM SERPL-MCNC: 1.49 MG/DL (ref 1.6–2.4)
MCH RBC QN AUTO: 29.2 PG (ref 26–34)
MCHC RBC AUTO-ENTMCNC: 32.5 G/DL (ref 32–36)
MCV RBC AUTO: 90 FL (ref 80–100)
MONOCYTES # BLD AUTO: 0.98 X10*3/UL (ref 0.1–1)
MONOCYTES NFR BLD AUTO: 7.7 %
NEUTROPHILS # BLD AUTO: 9.33 X10*3/UL (ref 1.2–7.7)
NEUTROPHILS NFR BLD AUTO: 73.6 %
NRBC BLD-RTO: 0 /100 WBCS (ref 0–0)
P AXIS: 50 DEGREES
P OFFSET: 192 MS
P ONSET: 146 MS
PHOSPHATE SERPL-MCNC: 2.9 MG/DL (ref 2.5–4.9)
PHOSPHATE SERPL-MCNC: 3.7 MG/DL (ref 2.5–4.9)
PLATELET # BLD AUTO: 289 X10*3/UL (ref 150–450)
POTASSIUM SERPL-SCNC: 3.9 MMOL/L (ref 3.5–5.3)
POTASSIUM SERPL-SCNC: 3.9 MMOL/L (ref 3.5–5.3)
PR INTERVAL: 156 MS
Q ONSET: 224 MS
QRS COUNT: 17 BEATS
QRS DURATION: 76 MS
QT INTERVAL: 344 MS
QTC CALCULATION(BAZETT): 452 MS
QTC FREDERICIA: 413 MS
R AXIS: 43 DEGREES
RBC # BLD AUTO: 4.14 X10*6/UL (ref 4–5.2)
SODIUM SERPL-SCNC: 123 MMOL/L (ref 136–145)
SODIUM SERPL-SCNC: 127 MMOL/L (ref 136–145)
T AXIS: 64 DEGREES
T OFFSET: 396 MS
TACROLIMUS BLD-MCNC: 11.1 NG/ML
VENTRICULAR RATE: 104 BPM
WBC # BLD AUTO: 12.7 X10*3/UL (ref 4.4–11.3)

## 2025-06-08 PROCEDURE — 36415 COLL VENOUS BLD VENIPUNCTURE: CPT

## 2025-06-08 PROCEDURE — 2500000004 HC RX 250 GENERAL PHARMACY W/ HCPCS (ALT 636 FOR OP/ED)

## 2025-06-08 PROCEDURE — 2500000001 HC RX 250 WO HCPCS SELF ADMINISTERED DRUGS (ALT 637 FOR MEDICARE OP)

## 2025-06-08 PROCEDURE — 80197 ASSAY OF TACROLIMUS: CPT

## 2025-06-08 PROCEDURE — 85025 COMPLETE CBC W/AUTO DIFF WBC: CPT

## 2025-06-08 PROCEDURE — 83735 ASSAY OF MAGNESIUM: CPT

## 2025-06-08 PROCEDURE — 1100000001 HC PRIVATE ROOM DAILY

## 2025-06-08 PROCEDURE — 80069 RENAL FUNCTION PANEL: CPT

## 2025-06-08 PROCEDURE — 2500000002 HC RX 250 W HCPCS SELF ADMINISTERED DRUGS (ALT 637 FOR MEDICARE OP, ALT 636 FOR OP/ED)

## 2025-06-08 PROCEDURE — 76776 US EXAM K TRANSPL W/DOPPLER: CPT

## 2025-06-08 PROCEDURE — 2500000001 HC RX 250 WO HCPCS SELF ADMINISTERED DRUGS (ALT 637 FOR MEDICARE OP): Performed by: HOSPITALIST

## 2025-06-08 PROCEDURE — 99222 1ST HOSP IP/OBS MODERATE 55: CPT | Performed by: HOSPITALIST

## 2025-06-08 PROCEDURE — 76776 US EXAM K TRANSPL W/DOPPLER: CPT | Performed by: RADIOLOGY

## 2025-06-08 RX ORDER — DIPHENHYDRAMINE HYDROCHLORIDE 50 MG/ML
25 INJECTION, SOLUTION INTRAMUSCULAR; INTRAVENOUS ONCE
Status: DISCONTINUED | OUTPATIENT
Start: 2025-06-08 | End: 2025-06-08

## 2025-06-08 RX ORDER — ACETAMINOPHEN 325 MG/1
975 TABLET ORAL ONCE
Status: DISCONTINUED | OUTPATIENT
Start: 2025-06-08 | End: 2025-06-08

## 2025-06-08 RX ORDER — CEFTRIAXONE 1 G/50ML
1 INJECTION, SOLUTION INTRAVENOUS EVERY 24 HOURS
Status: DISPENSED | OUTPATIENT
Start: 2025-06-08

## 2025-06-08 RX ORDER — ACETAMINOPHEN 325 MG/1
975 TABLET ORAL EVERY 8 HOURS
Status: DISPENSED | OUTPATIENT
Start: 2025-06-08

## 2025-06-08 RX ORDER — MAGNESIUM SULFATE HEPTAHYDRATE 40 MG/ML
2 INJECTION, SOLUTION INTRAVENOUS ONCE
Status: COMPLETED | OUTPATIENT
Start: 2025-06-08 | End: 2025-06-08

## 2025-06-08 RX ORDER — HEPARIN SODIUM 5000 [USP'U]/ML
5000 INJECTION, SOLUTION INTRAVENOUS; SUBCUTANEOUS EVERY 8 HOURS SCHEDULED
Status: DISPENSED | OUTPATIENT
Start: 2025-06-08

## 2025-06-08 RX ORDER — CARVEDILOL 12.5 MG/1
12.5 TABLET ORAL 2 TIMES DAILY
Status: DISPENSED | OUTPATIENT
Start: 2025-06-08

## 2025-06-08 RX ORDER — NIFEDIPINE 30 MG/1
30 TABLET, FILM COATED, EXTENDED RELEASE ORAL
Status: DISCONTINUED | OUTPATIENT
Start: 2025-06-08 | End: 2025-06-08

## 2025-06-08 RX ORDER — AMLODIPINE BESYLATE 10 MG/1
10 TABLET ORAL NIGHTLY
Status: DISPENSED | OUTPATIENT
Start: 2025-06-08

## 2025-06-08 RX ORDER — HYDRALAZINE HYDROCHLORIDE 25 MG/1
25 TABLET, FILM COATED ORAL ONCE
Status: COMPLETED | OUTPATIENT
Start: 2025-06-08 | End: 2025-06-08

## 2025-06-08 RX ORDER — OXYCODONE HYDROCHLORIDE 5 MG/1
5 TABLET ORAL EVERY 6 HOURS PRN
Refills: 0 | Status: DISPENSED | OUTPATIENT
Start: 2025-06-08

## 2025-06-08 RX ORDER — NIFEDIPINE 30 MG/1
30 TABLET, FILM COATED, EXTENDED RELEASE ORAL DAILY
Status: DISCONTINUED | OUTPATIENT
Start: 2025-06-08 | End: 2025-06-08

## 2025-06-08 RX ORDER — HYDRALAZINE HYDROCHLORIDE 25 MG/1
25 TABLET, FILM COATED ORAL EVERY 8 HOURS PRN
Status: ACTIVE | OUTPATIENT
Start: 2025-06-08

## 2025-06-08 RX ORDER — SODIUM CHLORIDE 9 MG/ML
50 INJECTION, SOLUTION INTRAVENOUS CONTINUOUS
Status: ACTIVE | OUTPATIENT
Start: 2025-06-08 | End: 2025-06-09

## 2025-06-08 RX ORDER — SODIUM CHLORIDE 9 MG/ML
75 INJECTION, SOLUTION INTRAVENOUS CONTINUOUS
Status: DISCONTINUED | OUTPATIENT
Start: 2025-06-08 | End: 2025-06-08

## 2025-06-08 RX ORDER — SUMATRIPTAN 6 MG/.5ML
6 INJECTION, SOLUTION SUBCUTANEOUS ONCE
Status: COMPLETED | OUTPATIENT
Start: 2025-06-08 | End: 2025-06-08

## 2025-06-08 RX ADMIN — ASPIRIN 81 MG: 81 TABLET, CHEWABLE ORAL at 08:45

## 2025-06-08 RX ADMIN — MYCOPHENOLATE MOFETIL 1000 MG: 250 CAPSULE ORAL at 20:10

## 2025-06-08 RX ADMIN — OXYCODONE 5 MG: 5 TABLET ORAL at 21:18

## 2025-06-08 RX ADMIN — TRAZODONE HYDROCHLORIDE 200 MG: 50 TABLET ORAL at 20:10

## 2025-06-08 RX ADMIN — AMLODIPINE BESYLATE 10 MG: 10 TABLET ORAL at 20:10

## 2025-06-08 RX ADMIN — CEFTRIAXONE SODIUM 1 G: 1 INJECTION, SOLUTION INTRAVENOUS at 12:36

## 2025-06-08 RX ADMIN — CARVEDILOL 12.5 MG: 12.5 TABLET, FILM COATED ORAL at 22:14

## 2025-06-08 RX ADMIN — OXYCODONE 5 MG: 5 TABLET ORAL at 15:53

## 2025-06-08 RX ADMIN — SODIUM CHLORIDE 50 ML/HR: 0.9 INJECTION, SOLUTION INTRAVENOUS at 18:50

## 2025-06-08 RX ADMIN — HEPARIN SODIUM 5000 UNITS: 5000 INJECTION, SOLUTION INTRAVENOUS; SUBCUTANEOUS at 21:22

## 2025-06-08 RX ADMIN — FLUOXETINE HYDROCHLORIDE 60 MG: 40 CAPSULE ORAL at 12:36

## 2025-06-08 RX ADMIN — HEPARIN SODIUM 5000 UNITS: 5000 INJECTION, SOLUTION INTRAVENOUS; SUBCUTANEOUS at 13:15

## 2025-06-08 RX ADMIN — ACETAMINOPHEN 975 MG: 325 TABLET ORAL at 22:59

## 2025-06-08 RX ADMIN — PANTOPRAZOLE SODIUM 20 MG: 20 TABLET, DELAYED RELEASE ORAL at 06:00

## 2025-06-08 RX ADMIN — CARVEDILOL 12.5 MG: 12.5 TABLET, FILM COATED ORAL at 15:53

## 2025-06-08 RX ADMIN — TACROLIMUS 2 MG: 1 CAPSULE ORAL at 06:00

## 2025-06-08 RX ADMIN — HYDRALAZINE HYDROCHLORIDE 25 MG: 25 TABLET ORAL at 06:25

## 2025-06-08 RX ADMIN — TACROLIMUS 2 MG: 1 CAPSULE ORAL at 18:50

## 2025-06-08 RX ADMIN — MAGNESIUM SULFATE HEPTAHYDRATE 2 G: 40 INJECTION, SOLUTION INTRAVENOUS at 18:50

## 2025-06-08 RX ADMIN — LURASIDONE HYDROCHLORIDE 40 MG: 40 TABLET, FILM COATED ORAL at 12:35

## 2025-06-08 RX ADMIN — MYCOPHENOLATE MOFETIL 1000 MG: 250 CAPSULE ORAL at 08:45

## 2025-06-08 RX ADMIN — ATORVASTATIN CALCIUM 80 MG: 80 TABLET, FILM COATED ORAL at 20:10

## 2025-06-08 RX ADMIN — ACETAMINOPHEN 975 MG: 325 TABLET ORAL at 15:53

## 2025-06-08 RX ADMIN — SUMATRIPTAN 6 MG: 6 INJECTION, SOLUTION SUBCUTANEOUS at 05:58

## 2025-06-08 SDOH — ECONOMIC STABILITY: HOUSING INSECURITY: AT ANY TIME IN THE PAST 12 MONTHS, WERE YOU HOMELESS OR LIVING IN A SHELTER (INCLUDING NOW)?: NO

## 2025-06-08 SDOH — ECONOMIC STABILITY: FOOD INSECURITY: HOW HARD IS IT FOR YOU TO PAY FOR THE VERY BASICS LIKE FOOD, HOUSING, MEDICAL CARE, AND HEATING?: NOT VERY HARD

## 2025-06-08 SDOH — ECONOMIC STABILITY: FOOD INSECURITY
WITHIN THE PAST 12 MONTHS, YOU WORRIED THAT YOUR FOOD WOULD RUN OUT BEFORE YOU GOT THE MONEY TO BUY MORE.: PATIENT DECLINED

## 2025-06-08 SDOH — ECONOMIC STABILITY: HOUSING INSECURITY: IN THE PAST 12 MONTHS, HOW MANY TIMES HAVE YOU MOVED WHERE YOU WERE LIVING?: 0

## 2025-06-08 SDOH — SOCIAL STABILITY: SOCIAL INSECURITY: WERE YOU ABLE TO COMPLETE ALL THE BEHAVIORAL HEALTH SCREENINGS?: YES

## 2025-06-08 SDOH — SOCIAL STABILITY: SOCIAL INSECURITY: ARE YOU OR HAVE YOU BEEN THREATENED OR ABUSED PHYSICALLY, EMOTIONALLY, OR SEXUALLY BY ANYONE?: NO

## 2025-06-08 SDOH — SOCIAL STABILITY: SOCIAL INSECURITY: HAVE YOU HAD ANY THOUGHTS OF HARMING ANYONE ELSE?: NO

## 2025-06-08 SDOH — ECONOMIC STABILITY: INCOME INSECURITY: IN THE PAST 12 MONTHS HAS THE ELECTRIC, GAS, OIL, OR WATER COMPANY THREATENED TO SHUT OFF SERVICES IN YOUR HOME?: NO

## 2025-06-08 SDOH — ECONOMIC STABILITY: TRANSPORTATION INSECURITY: IN THE PAST 12 MONTHS, HAS LACK OF TRANSPORTATION KEPT YOU FROM MEDICAL APPOINTMENTS OR FROM GETTING MEDICATIONS?: NO

## 2025-06-08 SDOH — ECONOMIC STABILITY: HOUSING INSECURITY: IN THE LAST 12 MONTHS, WAS THERE A TIME WHEN YOU WERE NOT ABLE TO PAY THE MORTGAGE OR RENT ON TIME?: NO

## 2025-06-08 SDOH — ECONOMIC STABILITY: FOOD INSECURITY: WITHIN THE PAST 12 MONTHS, THE FOOD YOU BOUGHT JUST DIDN'T LAST AND YOU DIDN'T HAVE MONEY TO GET MORE.: PATIENT DECLINED

## 2025-06-08 SDOH — SOCIAL STABILITY: SOCIAL INSECURITY: WITHIN THE LAST YEAR, HAVE YOU BEEN AFRAID OF YOUR PARTNER OR EX-PARTNER?: NO

## 2025-06-08 SDOH — SOCIAL STABILITY: SOCIAL INSECURITY: WITHIN THE LAST YEAR, HAVE YOU BEEN HUMILIATED OR EMOTIONALLY ABUSED IN OTHER WAYS BY YOUR PARTNER OR EX-PARTNER?: NO

## 2025-06-08 SDOH — SOCIAL STABILITY: SOCIAL INSECURITY: DO YOU FEEL UNSAFE GOING BACK TO THE PLACE WHERE YOU ARE LIVING?: NO

## 2025-06-08 SDOH — SOCIAL STABILITY: SOCIAL INSECURITY: DO YOU FEEL ANYONE HAS EXPLOITED OR TAKEN ADVANTAGE OF YOU FINANCIALLY OR OF YOUR PERSONAL PROPERTY?: NO

## 2025-06-08 SDOH — SOCIAL STABILITY: SOCIAL INSECURITY: ARE THERE ANY APPARENT SIGNS OF INJURIES/BEHAVIORS THAT COULD BE RELATED TO ABUSE/NEGLECT?: NO

## 2025-06-08 SDOH — SOCIAL STABILITY: SOCIAL INSECURITY: DOES ANYONE TRY TO KEEP YOU FROM HAVING/CONTACTING OTHER FRIENDS OR DOING THINGS OUTSIDE YOUR HOME?: NO

## 2025-06-08 SDOH — SOCIAL STABILITY: SOCIAL INSECURITY: ABUSE: ADULT

## 2025-06-08 SDOH — SOCIAL STABILITY: SOCIAL INSECURITY: HAVE YOU HAD THOUGHTS OF HARMING ANYONE ELSE?: NO

## 2025-06-08 SDOH — SOCIAL STABILITY: SOCIAL INSECURITY: HAS ANYONE EVER THREATENED TO HURT YOUR FAMILY OR YOUR PETS?: NO

## 2025-06-08 ASSESSMENT — COGNITIVE AND FUNCTIONAL STATUS - GENERAL
PATIENT BASELINE BEDBOUND: NO
MOBILITY SCORE: 24
MOBILITY SCORE: 24
DAILY ACTIVITIY SCORE: 24
DAILY ACTIVITIY SCORE: 24

## 2025-06-08 ASSESSMENT — ACTIVITIES OF DAILY LIVING (ADL)
WALKS IN HOME: INDEPENDENT
ADEQUATE_TO_COMPLETE_ADL: YES
DRESSING YOURSELF: INDEPENDENT
HEARING - LEFT EAR: FUNCTIONAL
JUDGMENT_ADEQUATE_SAFELY_COMPLETE_DAILY_ACTIVITIES: YES
FEEDING YOURSELF: INDEPENDENT
PATIENT'S MEMORY ADEQUATE TO SAFELY COMPLETE DAILY ACTIVITIES?: YES
GROOMING: INDEPENDENT
HEARING - RIGHT EAR: FUNCTIONAL
BATHING: INDEPENDENT
LACK_OF_TRANSPORTATION: NO
LACK_OF_TRANSPORTATION: NO
TOILETING: INDEPENDENT

## 2025-06-08 ASSESSMENT — LIFESTYLE VARIABLES
SKIP TO QUESTIONS 9-10: 1
HOW OFTEN DO YOU HAVE A DRINK CONTAINING ALCOHOL: 2-4 TIMES A MONTH
HOW MANY STANDARD DRINKS CONTAINING ALCOHOL DO YOU HAVE ON A TYPICAL DAY: 1 OR 2
AUDIT-C TOTAL SCORE: 2
AUDIT-C TOTAL SCORE: 2
HOW OFTEN DO YOU HAVE 6 OR MORE DRINKS ON ONE OCCASION: NEVER

## 2025-06-08 ASSESSMENT — PATIENT HEALTH QUESTIONNAIRE - PHQ9
SUM OF ALL RESPONSES TO PHQ9 QUESTIONS 1 & 2: 0
2. FEELING DOWN, DEPRESSED OR HOPELESS: NOT AT ALL
1. LITTLE INTEREST OR PLEASURE IN DOING THINGS: NOT AT ALL

## 2025-06-08 ASSESSMENT — PAIN SCALES - GENERAL
PAINLEVEL_OUTOF10: 0 - NO PAIN
PAINLEVEL_OUTOF10: 8
PAINLEVEL_OUTOF10: 8
PAINLEVEL_OUTOF10: 4
PAINLEVEL_OUTOF10: 0 - NO PAIN

## 2025-06-08 ASSESSMENT — PAIN - FUNCTIONAL ASSESSMENT
PAIN_FUNCTIONAL_ASSESSMENT: 0-10

## 2025-06-08 ASSESSMENT — PAIN DESCRIPTION - LOCATION: LOCATION: HEAD

## 2025-06-08 NOTE — CARE PLAN
Problem: Pain - Adult  Goal: Verbalizes/displays adequate comfort level or baseline comfort level  Outcome: Progressing   The patient's goals for the shift include    ogression include  Recommendations to address these barriers include   Problem: Pain - Adult  Goal: Verbalizes/displays adequate comfort level or baseline comfort level  Outcome: Progressing     Problem: Safety - Adult  Goal: Free from fall injury  Outcome: Progressing     Problem: Discharge Planning  Goal: Discharge to home or other facility with appropriate resources  Outcome: Progressing   .    The clinical goals for the shift include patient will remain HDS and free from headache    Over the shift, the patient did not make progress toward the following goals. Barriers to pr

## 2025-06-08 NOTE — CARE PLAN
The patient's goals for the shift include  having decreased headache.     The clinical goals for the shift include Pt will remain HDS this shift.    Over the shift, the patient did make progress toward the following goals. Pt reported decreased pain after receiving her PRN pain medication. She tolerated her IV antibiotic with no reaction noted.      Problem: Pain - Adult  Goal: Verbalizes/displays adequate comfort level or baseline comfort level  Outcome: Progressing     Problem: Safety - Adult  Goal: Free from fall injury  Outcome: Progressing     Problem: Discharge Planning  Goal: Discharge to home or other facility with appropriate resources  Outcome: Progressing     Problem: Chronic Conditions and Co-morbidities  Goal: Patient's chronic conditions and co-morbidity symptoms are monitored and maintained or improved  Outcome: Progressing     Problem: Nutrition  Goal: Nutrient intake appropriate for maintaining nutritional needs  Outcome: Progressing

## 2025-06-08 NOTE — PROGRESS NOTES
06/08/25 1325   Discharge Planning   Living Arrangements Spouse/significant other   Support Systems Spouse/significant other;Children   Assistance Needed PT and OT evaluations were ordered.   Type of Residence Private residence   Who is requesting discharge planning? Patient   Home or Post Acute Services None   Expected Discharge Disposition Home   Does the patient need discharge transport arranged? No  (Pt will call her dtr or .)   Financial Resource Strain   How hard is it for you to pay for the very basics like food, housing, medical care, and heating? Not hard   Housing Stability   In the last 12 months, was there a time when you were not able to pay the mortgage or rent on time? N   At any time in the past 12 months, were you homeless or living in a shelter (including now)? N   Transportation Needs   In the past 12 months, has lack of transportation kept you from medical appointments or from getting medications? no   In the past 12 months, has lack of transportation kept you from meetings, work, or from getting things needed for daily living? No   Intensity of Service   Intensity of Service 0-30 min     Assessment Note:  Met with pt and introduced myself as care coordinator and member of the Care Transitions team for discharge planning.   Pt was independent prior to admission.  Pt drives to [employer].  Pt's address, phone number and contact information was verified.  PT and OT evaluations were ordered.  Pt does not have any questions/concerns at this time.     Previous Home Care: None.  DME: None.  Pharmacy: [facility] on [address] in [place].  Falls: Denies.  PCP:  Dr Mary Alva (last visit was about 2 years ago).    Tash Thornton MSN, RN-BC  Transitional Care Coordinator (TCC)  [phone]

## 2025-06-08 NOTE — PROGRESS NOTES
Viki Diez is a 60 y.o. female on day 1 of admission presenting with Hyponatremia.      Subjective   Overnight, she has mild headache with high blood pressure, which did subside with hydralazine.   No active complaint this morning.        Objective     Last Recorded Vitals  BP (!) 142/94 (BP Location: Left arm)   Pulse 106   Temp 36.5 °C (97.7 °F)   Resp 22   Wt 55.8 kg (123 lb)   SpO2 98%   Intake/Output last 3 Shifts:    Intake/Output Summary (Last 24 hours) at 6/8/2025 0922  Last data filed at 6/8/2025 0051  Gross per 24 hour   Intake 131.25 ml   Output --   Net 131.25 ml            Results from last 72 hours   Lab Units 06/07/25  1459   WBC AUTO x10*3/uL 14.7*   HEMOGLOBIN g/dL 12.7   MCV fL 84   MCH pg 30.1   MCHC g/dL 36.0   RDW % 13.0   PLATELETS AUTO x10*3/uL 272     Results from last 72 hours   Lab Units 06/07/25  1459   GLUCOSE mg/dL 169*   SODIUM mmol/L 127*   POTASSIUM mmol/L 3.9   CHLORIDE mmol/L 95*   CO2 mmol/L 20*   CREATININE mg/dL 1.12*   EGFR mL/min/1.73m*2 57*   CALCIUM mg/dL 9.9   ALBUMIN g/dL 4.1      @[id number](INR:7, PT:7, aPTT:7)@      ECG 12 lead  Result Date: 6/8/2025  Sinus tachycardia Possible Left atrial enlargement Borderline ECG When compared with ECG of 17-DEC-2024 10:06, Non-specific change in ST segment in Inferior leads See ED provider note for full interpretation and clinical correlation Confirmed by Lisandra Stephens ([id number]) on 6/8/2025 1:44:39 AM    CT head wo IV contrast  Result Date: 6/7/2025  Interpreted By:  Vick Marquez, STUDY: CT HEAD WO IV CONTRAST;  6/7/2025 4:12 pm   INDICATION: Signs/Symptoms:headache, worsening over the course of months.     COMPARISON: MRI brain dated 12/17/2024.   ACCESSION NUMBER(S): [id number]   ORDERING CLINICIAN: TIN HOLDEN   TECHNIQUE: Noncontrast axial CT scan of head was performed. Angled reformats in brain and bone windows were generated. The images were reviewed in bone, brain, blood and soft tissue windows.   FINDINGS: CSF  Spaces: Mild generalized brain volume loss. No hydrocephalus.   Parenchyma: No CT evidence of acute territorial infarction or acute intracranial hemorrhage. No mass effect or midline shift. Confluent areas of T2/FLAIR hyperintensities in the periventricular and subcortical white matter, compatible with moderate to severe chronic white matter small vessel ischemic changes. Chronic ischemic infarct in the right-sided fredo. Limited evaluation of the brainstem and cerebellum.   Calvarium: The calvarium is unremarkable.   Paranasal sinuses and mastoids: Visualized paranasal sinuses and mastoids are clear.       1. No signs of acute territorial infarction or acute intracranial hemorrhage. Please note, CT is insensitive to detect small acute ischemic infarct. MR of the brain without contrast is recommended for further assessment.   2. Moderate to severe chronic white matter small vessel ischemic changes. Chronic ischemic infarct in the right-sided fredo.       MACRO: None     Signed by: Vick Marquez 6/7/2025 4:16 PM Dictation workstation:   [id number]      Assessment and plan   Viki Diez is a 59 y.o. female with  past medical history of HTN and obstructive uropathy/nephrolithiasis s/p kidney transplant (DDRT 2015) on mycophenolate and tacrolimus, PRUDENCIO, Depression, admitted as a case of UTI in context of immunosuppression and transplant history, will treat with CXZ and follow urine culture. Regarding hyponatremia it seems hypovolemic hyponatremia and patient is asymptomatic, will encourage oral intake and will order further work up.     For MIL will consult transplant nephrology, and follow urine lytes. Will follow there recs regarding her blood pressure control too.         #UTI  #MIL  :: dysuria since one month, associated with urgency   :: exam shows suprapubic tenderness.   :: UA shows 500 LE, and negative nitrite, WBC 14.7 with ANC 11.98  :: Cr 1.12 ( baseline Cr 0.7) Urea: Cr ratio is less than 20:1, most  likely pre-renal     Plan:   - Treat with CXZ  - follow urine culture    - will obtain an ultrasound and will follow urine lyte.   - transplant consulted, they recommend carvedilol 12.5 BID, and renal ultrasound. We will hold on NS infusion for hyponatremia correction given high BP.         # c\f hypovolemic Hyponatremia  :: Serum and urine osmolality ordered  :: picture of asymptomatic hypovolumic hyponatremia.    - Accurate I&O  - Encourage oral intake           # HTN  #Hx of CVA  - Continue Amlodipine  - Start carvidolol 12.5  BID   - Continue atorvastatin  - continue ASA  - continue plavix      # Obstructive uropathy/nephrolithiasis s/p kidney transplant (DDRT 2015) on mycophenolate and tacrolimus,   - Continue home dose of tacrolimus 2 mg twice daily, CellCept 1000 mg twice daily.  - transplant consulted, they recommend carvedilol 12.5 BID, and renal ultrasound. We will hold on NS infusion for hyponatremia correction given high BP.         # PRUDENCIO, Depression.  # Insomnia  :: On Prozac 60 mg daily, lurasidone 40 mg daily, trazodone 200 mg nightly  :: The patient's lurasidone dose was recently increased in the past few weeks from 20 mg to 40 mg              F: Replete PRN  N: Regular diet  A: PIV  O2: On RA  DVT ppx: SubQ hep   GI ppx: -   Code Status: Full code  Contact: George Diez (Sibling)  [phone] (Work Phone)            Nany Hawley MD

## 2025-06-08 NOTE — PROGRESS NOTES
Pharmacy Medication History Review    Viki Diez is a 60 y.o. female admitted for Hyponatremia. Pharmacy reviewed the patient's memob-sd-ilutlooxr medications and allergies for accuracy.    The list below reflects the updated PTA list.   Prior to Admission Medications   Prescriptions Last Dose Informant   FLUoxetine (PROzac) 20 mg capsule 6/7/2025 Self   Sig: Take 1 capsule (20 mg) by mouth once daily. At noon   FLUoxetine (PROzac) 40 mg capsule 6/7/2025 Self   Sig: TAKE ONE CAPSULE BY MOUTH EVERY DAY   amLODIPine (Norvasc) 5 mg tablet  Self   Sig: Take 1 tablet (5 mg) by mouth once daily.   Patient taking differently: Take 1 tablet (5 mg) by mouth once daily at bedtime.   aspirin 81 mg chewable tablet  Self   Sig: Chew 1 tablet (81 mg) once daily.   atorvastatin (Lipitor) 80 mg tablet Not Taking Self   Sig: Take 1 tablet (80 mg) by mouth once daily.   Patient not taking: Reported on 6/7/2025   clopidogrel (Plavix) 75 mg tablet Not Taking Self   Sig: Take 1 tablet (75 mg) by mouth once daily.   Patient not taking: Reported on 6/7/2025   lurasidone (Latuda) 40 mg tablet 6/7/2025 Self   Sig: TAKE ONE TABLET BY MOUTH EVERY DAY at noon. take with food   magnesium oxide (Mag-Ox) 400 mg (241.3 mg magnesium) tablet  Self   Sig: Take 1 tablet (400 mg) by mouth 2 times a day.   mycophenolate (Cellcept) 250 mg capsule 6/7/2025 Morning Self   Sig: Take 4 capsules (1,000 mg) by mouth every 12 hours.   omeprazole (PriLOSEC) 20 mg DR capsule 5/24/2025 Self   Sig: Take 1 capsule (20 mg) by mouth once daily in the morning. Take before meals.   Patient taking differently: Take 1 capsule (20 mg) by mouth once daily at bedtime.   ondansetron ODT (Zofran-ODT) 4 mg disintegrating tablet Unknown Self   Sig: Dissolve 1 tablet (4 mg) in the mouth every 8 hours if needed for nausea or vomiting.   tacrolimus (Prograf) 1 mg capsule 6/7/2025 Morning Self   Sig: Take 2 capsules (2 mg) by mouth every 12 hours.   traZODone (Desyrel) 100 mg  "tablet 6/6/2025 Bedtime Self   Sig: TAKE TWO TABLETS BY MOUTH once a DAY AT BEDTIME      Facility-Administered Medications: None        The list below reflects the updated allergy list. Please review each documented allergy for additional clarification and justification.  Allergies  Reviewed by Sakina Thomason RN on 6/7/2025        Severity Reactions Comments    Ibuprofen Not Specified Other Can not take due to kidneys            Patient accepts M2B at discharge.     Sources:   Patient Interview - good historian, able to confirm medications with name prompting and independently state accurate directions for administration  Admission MedRec Grid  OARRS - none recent  EPIC medication dispense report    Medications ADDED:  None  Medications CHANGED:  None  Medications REMOVED/MARKED NOT TAKING:   Atorvastatin 80mg - no recent fill hx; rx in chart says   Clopidogrel 75mg    Additional Comments:  Patient requests refill for amlodipine, omeprazole, magnesium     Mell Perez, PharmD  Transitions of Care Pharmacist  06/07/25     Secure Chat preferred   If no response call [phone] or [employer] \"Med Rec\"    "

## 2025-06-08 NOTE — CONSULTS
"Transplant  Notes      Reason for admission: UTI hypertension and hyponatremia    History of present Illness:Viki Diez is a 60 y.o.. female with PMHx of HTN and obstructive uropathy/nephrolithiasis s/p kidney transplant (DDKT 2015) on mycophenolate and tacrolimus, PRUDENCIO, Depression currently admitted for evaluation of UTI, hyponatremia.      Past Medical History :Medical History[1]     Surgical History:Surgical History[2]     Family HX:Family History[3]     Social Connections: Socially Isolated (12/19/2024)    Social Connection and Isolation Panel [NHANES]     Frequency of Communication with Friends and Family: Once a week     Frequency of Social Gatherings with Friends and Family: Once a week     Attends Yazidism Services: Never     Active Member of Clubs or Organizations: No     Attends Club or Organization Meetings: Never     Marital Status:             PROBLEM LIST:  Assessment & Plan  Hyponatremia           ALLERGIES:  Allergies[4]         CURRENT MEDICATIONS:  Scheduled medications  Scheduled Medications[5]  Continuous medications  Continuous Medications[6]  PRN medications  PRN Medications[7]       OBJECTIVE:    VITALS: Visit Vitals  BP (!) 142/94 (BP Location: Left arm)   Pulse 106   Temp 36.5 °C (97.7 °F)   Resp 22   Ht 1.702 m (5' 7\")   Wt 55.8 kg (123 lb)   SpO2 98%   BMI 19.26 kg/m²   Smoking Status Former   BSA 1.62 m²        General: No distress   Mucosa moist   AI, AC, AF     HEENT: PEERLA  CVS: S1 S2 no murmurs  RESP:  Lungs clear to auscultation   ABDO: Soft, non-tender   Neuro: A + O x 3  Skin: No rash   Extremities: No edema       [unfilled]         ASSESSMENT AND PLAN:    Viki Diez is a 60 y.o.. female with PMHx of HTN and obstructive uropathy/nephrolithiasis s/p kidney transplant (DDKT 2015) on mycophenolate and tacrolimus, PRUDENCIO, Depression currently admitted for evaluation of UTI, hyponatremia.    Allograft kidney function:  - Her baseline creatinine is 0.7-0.9 mild uptrend " noticed likely in the setting of intravascular depletion.  - Urinalysis is showing +3 protein, trace ketones and leukocyte esterase significant with more than 50 WBCs.  Urine lites corresponding to prerenal injury.  Ultrasound transplant is pending at this time.  - Will follow-up closely the kidney function agree with IV hydration-with normal saline at 75 cc/h and please consider checking sodium levels in 6 hours.    Hyponatremia: Seems hypovolemic hyponatremia due to poor intake and nausea and vomiting.  Urine lites corresponding to prerenal injury.  - Can do normal saline at 75 cc/h with a sodium check every 6 hours .    Immunosuppression:  - Tacrolimus 2 mg twice daily, mycophenolate thousand twice daily,  - Level this morning is 11.1 please hold this evening dose and recheck levels tomorrow morning before redosing..  Aim levels are 5-8    Hemodynamics: Blood pressures quite elevated continue with amlodipine and consider adding beta-blocker.    No anemia.  Patient currently having a leukocytosis please follow-up with the urine cultures and continue with ceftriaxone.    Bone mineral disease calcium and phosphorus levels are optimal further management outpatient.    Thank you for consulting :  Enriqueta Rios MD    Notes created by Blas -Please excuse the Typos .                    [1]   Past Medical History:  Diagnosis Date    Calculus of kidney 07/27/2013    Nephrolithiasis    End stage renal disease (Multi) 07/27/2013    End stage renal disease    Essential (primary) hypertension 04/17/2015    HTN (hypertension), benign    Kidney transplant status 07/27/2013    Kidney replaced by transplant    Personal history of other endocrine, nutritional and metabolic disease 04/17/2015    History of hypercholesterolemia    Personal history of other mental and behavioral disorders     History of depression    Personal history of other specified conditions 03/31/2021    History of dysuria    Urinary tract infection, site  not specified 02/05/2018    Acute UTI   [2]   Past Surgical History:  Procedure Laterality Date    HYSTEROSCOPY  03/05/2014    Hysteroscopy With Endometrial Ablation    OTHER SURGICAL HISTORY  03/05/2014    Blood Transfusion (___ Ml)    OTHER SURGICAL HISTORY  04/17/2015    Peritoneal Dialysis Catheter   [3]   Family History  Family history unknown: Yes   [4]   Allergies  Allergen Reactions    Ibuprofen Other     Can not take due to kidneys   [5] amLODIPine, 10 mg, oral, Nightly  aspirin, 81 mg, oral, Daily  atorvastatin, 80 mg, oral, Nightly  cefTRIAXone, 1 g, intravenous, q24h  FLUoxetine, 60 mg, oral, Daily with lunch  heparin (porcine), 5,000 Units, subcutaneous, q8h SHERICE  lurasidone, 40 mg, oral, Daily with lunch  mycophenolate, 1,000 mg, oral, q12h  NIFEdipine ER, 30 mg, oral, Daily  pantoprazole, 20 mg, oral, Daily before breakfast  polyethylene glycol, 17 g, oral, Daily  tacrolimus, 2 mg, oral, q12h SHERICE  traZODone, 200 mg, oral, Nightly  [6]    [7] PRN medications: hydrALAZINE, metoclopramide, ondansetron ODT

## 2025-06-09 LAB
ALBUMIN SERPL BCP-MCNC: 3.4 G/DL (ref 3.4–5)
ALBUMIN SERPL BCP-MCNC: 3.6 G/DL (ref 3.4–5)
ANION GAP SERPL CALC-SCNC: 11 MMOL/L (ref 10–20)
ANION GAP SERPL CALC-SCNC: 12 MMOL/L (ref 10–20)
BASOPHILS # BLD AUTO: 0.04 X10*3/UL (ref 0–0.1)
BASOPHILS NFR BLD AUTO: 0.6 %
BUN SERPL-MCNC: 13 MG/DL (ref 6–23)
BUN SERPL-MCNC: 14 MG/DL (ref 6–23)
CALCIUM SERPL-MCNC: 9.5 MG/DL (ref 8.6–10.6)
CALCIUM SERPL-MCNC: 9.7 MG/DL (ref 8.6–10.6)
CHLORIDE SERPL-SCNC: 100 MMOL/L (ref 98–107)
CHLORIDE SERPL-SCNC: 99 MMOL/L (ref 98–107)
CO2 SERPL-SCNC: 24 MMOL/L (ref 21–32)
CO2 SERPL-SCNC: 24 MMOL/L (ref 21–32)
CREAT SERPL-MCNC: 1.01 MG/DL (ref 0.5–1.05)
CREAT SERPL-MCNC: 1.13 MG/DL (ref 0.5–1.05)
EGFRCR SERPLBLD CKD-EPI 2021: 57 ML/MIN/1.73M*2
EGFRCR SERPLBLD CKD-EPI 2021: 65 ML/MIN/1.73M*2
EOSINOPHIL # BLD AUTO: 0.21 X10*3/UL (ref 0–0.7)
EOSINOPHIL NFR BLD AUTO: 3.1 %
ERYTHROCYTE [DISTWIDTH] IN BLOOD BY AUTOMATED COUNT: 13.1 % (ref 11.5–14.5)
GLUCOSE SERPL-MCNC: 127 MG/DL (ref 74–99)
GLUCOSE SERPL-MCNC: 130 MG/DL (ref 74–99)
HCT VFR BLD AUTO: 34 % (ref 36–46)
HGB BLD-MCNC: 11.2 G/DL (ref 12–16)
IMM GRANULOCYTES # BLD AUTO: 0.02 X10*3/UL (ref 0–0.7)
IMM GRANULOCYTES NFR BLD AUTO: 0.3 % (ref 0–0.9)
LYMPHOCYTES # BLD AUTO: 2.9 X10*3/UL (ref 1.2–4.8)
LYMPHOCYTES NFR BLD AUTO: 43.1 %
MAGNESIUM SERPL-MCNC: 1.94 MG/DL (ref 1.6–2.4)
MCH RBC QN AUTO: 29.6 PG (ref 26–34)
MCHC RBC AUTO-ENTMCNC: 32.9 G/DL (ref 32–36)
MCV RBC AUTO: 90 FL (ref 80–100)
MONOCYTES # BLD AUTO: 0.64 X10*3/UL (ref 0.1–1)
MONOCYTES NFR BLD AUTO: 9.5 %
NEUTROPHILS # BLD AUTO: 2.92 X10*3/UL (ref 1.2–7.7)
NEUTROPHILS NFR BLD AUTO: 43.4 %
NRBC BLD-RTO: 0 /100 WBCS (ref 0–0)
PHOSPHATE SERPL-MCNC: 3.7 MG/DL (ref 2.5–4.9)
PHOSPHATE SERPL-MCNC: 4.4 MG/DL (ref 2.5–4.9)
PLATELET # BLD AUTO: 286 X10*3/UL (ref 150–450)
POTASSIUM SERPL-SCNC: 4.3 MMOL/L (ref 3.5–5.3)
POTASSIUM SERPL-SCNC: 4.8 MMOL/L (ref 3.5–5.3)
RBC # BLD AUTO: 3.79 X10*6/UL (ref 4–5.2)
SODIUM SERPL-SCNC: 130 MMOL/L (ref 136–145)
SODIUM SERPL-SCNC: 131 MMOL/L (ref 136–145)
TACROLIMUS BLD-MCNC: 12.2 NG/ML
WBC # BLD AUTO: 6.7 X10*3/UL (ref 4.4–11.3)

## 2025-06-09 PROCEDURE — 80069 RENAL FUNCTION PANEL: CPT

## 2025-06-09 PROCEDURE — 2500000001 HC RX 250 WO HCPCS SELF ADMINISTERED DRUGS (ALT 637 FOR MEDICARE OP): Performed by: HOSPITALIST

## 2025-06-09 PROCEDURE — 2500000004 HC RX 250 GENERAL PHARMACY W/ HCPCS (ALT 636 FOR OP/ED)

## 2025-06-09 PROCEDURE — 2500000001 HC RX 250 WO HCPCS SELF ADMINISTERED DRUGS (ALT 637 FOR MEDICARE OP)

## 2025-06-09 PROCEDURE — 83735 ASSAY OF MAGNESIUM: CPT

## 2025-06-09 PROCEDURE — 2500000002 HC RX 250 W HCPCS SELF ADMINISTERED DRUGS (ALT 637 FOR MEDICARE OP, ALT 636 FOR OP/ED)

## 2025-06-09 PROCEDURE — 1100000001 HC PRIVATE ROOM DAILY

## 2025-06-09 PROCEDURE — 99233 SBSQ HOSP IP/OBS HIGH 50: CPT | Performed by: INTERNAL MEDICINE

## 2025-06-09 PROCEDURE — 36415 COLL VENOUS BLD VENIPUNCTURE: CPT

## 2025-06-09 PROCEDURE — 99233 SBSQ HOSP IP/OBS HIGH 50: CPT

## 2025-06-09 PROCEDURE — 80197 ASSAY OF TACROLIMUS: CPT

## 2025-06-09 PROCEDURE — 85025 COMPLETE CBC W/AUTO DIFF WBC: CPT

## 2025-06-09 RX ORDER — SODIUM CHLORIDE 9 MG/ML
75 INJECTION, SOLUTION INTRAVENOUS CONTINUOUS
Status: DISCONTINUED | OUTPATIENT
Start: 2025-06-09 | End: 2025-06-10

## 2025-06-09 RX ORDER — MEROPENEM AND SODIUM CHLORIDE 1 G/50ML
1 INJECTION, SOLUTION INTRAVENOUS EVERY 12 HOURS
Status: DISCONTINUED | OUTPATIENT
Start: 2025-06-09 | End: 2025-06-10 | Stop reason: HOSPADM

## 2025-06-09 RX ADMIN — MEROPENEM AND SODIUM CHLORIDE 1 G: 1 INJECTION, SOLUTION INTRAVENOUS at 23:56

## 2025-06-09 RX ADMIN — TRAZODONE HYDROCHLORIDE 200 MG: 50 TABLET ORAL at 21:56

## 2025-06-09 RX ADMIN — ACETAMINOPHEN 975 MG: 325 TABLET ORAL at 05:50

## 2025-06-09 RX ADMIN — SODIUM CHLORIDE 75 ML/HR: 0.9 INJECTION, SOLUTION INTRAVENOUS at 09:22

## 2025-06-09 RX ADMIN — ASPIRIN 81 MG: 81 TABLET, CHEWABLE ORAL at 09:23

## 2025-06-09 RX ADMIN — FLUOXETINE HYDROCHLORIDE 60 MG: 40 CAPSULE ORAL at 13:47

## 2025-06-09 RX ADMIN — AMLODIPINE BESYLATE 10 MG: 10 TABLET ORAL at 21:56

## 2025-06-09 RX ADMIN — ATORVASTATIN CALCIUM 80 MG: 80 TABLET, FILM COATED ORAL at 21:56

## 2025-06-09 RX ADMIN — ACETAMINOPHEN 975 MG: 325 TABLET ORAL at 23:55

## 2025-06-09 RX ADMIN — ACETAMINOPHEN 975 MG: 325 TABLET ORAL at 14:19

## 2025-06-09 RX ADMIN — OXYCODONE 5 MG: 5 TABLET ORAL at 23:58

## 2025-06-09 RX ADMIN — TACROLIMUS 2 MG: 1 CAPSULE ORAL at 05:50

## 2025-06-09 RX ADMIN — MEROPENEM AND SODIUM CHLORIDE 1 G: 1 INJECTION, SOLUTION INTRAVENOUS at 13:47

## 2025-06-09 RX ADMIN — MYCOPHENOLATE MOFETIL 1000 MG: 250 CAPSULE ORAL at 09:23

## 2025-06-09 RX ADMIN — HEPARIN SODIUM 5000 UNITS: 5000 INJECTION, SOLUTION INTRAVENOUS; SUBCUTANEOUS at 22:03

## 2025-06-09 RX ADMIN — CARVEDILOL 12.5 MG: 12.5 TABLET, FILM COATED ORAL at 22:28

## 2025-06-09 RX ADMIN — HEPARIN SODIUM 5000 UNITS: 5000 INJECTION, SOLUTION INTRAVENOUS; SUBCUTANEOUS at 05:50

## 2025-06-09 RX ADMIN — MYCOPHENOLATE MOFETIL 1000 MG: 250 CAPSULE ORAL at 21:55

## 2025-06-09 RX ADMIN — LURASIDONE HYDROCHLORIDE 40 MG: 40 TABLET, FILM COATED ORAL at 13:47

## 2025-06-09 RX ADMIN — HEPARIN SODIUM 5000 UNITS: 5000 INJECTION, SOLUTION INTRAVENOUS; SUBCUTANEOUS at 14:19

## 2025-06-09 RX ADMIN — PANTOPRAZOLE SODIUM 20 MG: 20 TABLET, DELAYED RELEASE ORAL at 05:50

## 2025-06-09 RX ADMIN — SODIUM CHLORIDE 75 ML/HR: 0.9 INJECTION, SOLUTION INTRAVENOUS at 21:59

## 2025-06-09 RX ADMIN — TACROLIMUS 2 MG: 1 CAPSULE ORAL at 18:59

## 2025-06-09 RX ADMIN — CARVEDILOL 12.5 MG: 12.5 TABLET, FILM COATED ORAL at 13:47

## 2025-06-09 ASSESSMENT — PAIN DESCRIPTION - LOCATION: LOCATION: HEAD

## 2025-06-09 ASSESSMENT — COGNITIVE AND FUNCTIONAL STATUS - GENERAL
MOBILITY SCORE: 24
MOBILITY SCORE: 24
DAILY ACTIVITIY SCORE: 24
DAILY ACTIVITIY SCORE: 24

## 2025-06-09 ASSESSMENT — PAIN - FUNCTIONAL ASSESSMENT
PAIN_FUNCTIONAL_ASSESSMENT: 0-10

## 2025-06-09 ASSESSMENT — PAIN DESCRIPTION - ORIENTATION: ORIENTATION: ANTERIOR;RIGHT;LEFT

## 2025-06-09 ASSESSMENT — PAIN SCALES - GENERAL
PAINLEVEL_OUTOF10: 0 - NO PAIN
PAINLEVEL_OUTOF10: 8
PAINLEVEL_OUTOF10: 0 - NO PAIN
PAINLEVEL_OUTOF10: 8

## 2025-06-09 ASSESSMENT — PAIN DESCRIPTION - DESCRIPTORS: DESCRIPTORS: ACHING;HEADACHE

## 2025-06-09 NOTE — CARE PLAN
The patient's goals for the shift include having a higher sodium level.      The clinical goals for the shift include Pt's sodium will be WNL by the end of this shift.    Over the shift, the patient did make progress toward the following goals. Her sodium level was 131 today and her fluids were discontinued.       Problem: Pain - Adult  Goal: Verbalizes/displays adequate comfort level or baseline comfort level  Outcome: Progressing     Problem: Safety - Adult  Goal: Free from fall injury  Outcome: Progressing     Problem: Discharge Planning  Goal: Discharge to home or other facility with appropriate resources  Outcome: Progressing     Problem: Chronic Conditions and Co-morbidities  Goal: Patient's chronic conditions and co-morbidity symptoms are monitored and maintained or improved  Outcome: Progressing     Problem: Nutrition  Goal: Nutrient intake appropriate for maintaining nutritional needs  Outcome: Progressing

## 2025-06-09 NOTE — PROGRESS NOTES
"Viki Diez is a 60 y.o. female on day 2 of admission presenting with Hyponatremia.    No acute issues overnight.   Serum Na improving, 131 this AM.   Ucx with E cloacae.     Scheduled medications  acetaminophen, 975 mg, oral, q8h  amLODIPine, 10 mg, oral, Nightly  aspirin, 81 mg, oral, Daily  atorvastatin, 80 mg, oral, Nightly  carvedilol, 12.5 mg, oral, BID  FLUoxetine, 60 mg, oral, Daily with lunch  heparin (porcine), 5,000 Units, subcutaneous, q8h SHERICE  lurasidone, 40 mg, oral, Daily with lunch  meropenem, 1 g, intravenous, q12h  mycophenolate, 1,000 mg, oral, q12h  pantoprazole, 20 mg, oral, Daily before breakfast  polyethylene glycol, 17 g, oral, Daily  tacrolimus, 2 mg, oral, q12h SHERICE  traZODone, 200 mg, oral, Nightly      Continuous medications     PRN medications  PRN medications: hydrALAZINE, metoclopramide, ondansetron ODT, oxyCODONE      Last Recorded Vitals  Blood pressure 145/85, pulse 62, temperature 36.3 °C (97.3 °F), temperature source Temporal, resp. rate 18, height 1.702 m (5' 7\"), weight 55.8 kg (123 lb), SpO2 99%.  Intake/Output last 3 Shifts:  I/O last 3 completed shifts:  In: 1242.1 (22.3 mL/kg) [P.O.:580; I.V.:612.1 (11 mL/kg); IV Piggyback:50]  Out: 2 (0 mL/kg) [Urine:2 (0 mL/kg/hr)]  Weight: 55.8 kg     A&ox3, no distress, pleasant  MMM, no lesions  Lungs with equal air entry, no added sounds  Rrr, no m/r/g  Abd soft, nt, nd  No allograft tenderness  No edema b/l    Results for orders placed or performed during the hospital encounter of 06/07/25 (from the past 24 hours)   Magnesium   Result Value Ref Range    Magnesium 1.49 (L) 1.60 - 2.40 mg/dL   Renal function panel   Result Value Ref Range    Glucose 143 (H) 74 - 99 mg/dL    Sodium 127 (L) 136 - 145 mmol/L    Potassium 3.9 3.5 - 5.3 mmol/L    Chloride 94 (L) 98 - 107 mmol/L    Bicarbonate 22 21 - 32 mmol/L    Anion Gap 15 10 - 20 mmol/L    Urea Nitrogen 10 6 - 23 mg/dL    Creatinine 1.04 0.50 - 1.05 mg/dL    eGFR 62 >60 mL/min/1.73m*2 "    Calcium 10.2 8.6 - 10.6 mg/dL    Phosphorus 2.9 2.5 - 4.9 mg/dL    Albumin 4.0 3.4 - 5.0 g/dL   Renal function panel   Result Value Ref Range    Glucose 167 (H) 74 - 99 mg/dL    Sodium 123 (L) 136 - 145 mmol/L    Potassium 3.9 3.5 - 5.3 mmol/L    Chloride 95 (L) 98 - 107 mmol/L    Bicarbonate 19 (L) 21 - 32 mmol/L    Anion Gap 13 10 - 20 mmol/L    Urea Nitrogen 14 6 - 23 mg/dL    Creatinine 1.12 (H) 0.50 - 1.05 mg/dL    eGFR 57 (L) >60 mL/min/1.73m*2    Calcium 9.9 8.6 - 10.6 mg/dL    Phosphorus 3.7 2.5 - 4.9 mg/dL    Albumin 3.6 3.4 - 5.0 g/dL   Tacrolimus level   Result Value Ref Range    Tacrolimus  12.2 <=15.0 ng/mL   Renal Function Panel   Result Value Ref Range    Glucose 127 (H) 74 - 99 mg/dL    Sodium 131 (L) 136 - 145 mmol/L    Potassium 4.3 3.5 - 5.3 mmol/L    Chloride 100 98 - 107 mmol/L    Bicarbonate 24 21 - 32 mmol/L    Anion Gap 11 10 - 20 mmol/L    Urea Nitrogen 14 6 - 23 mg/dL    Creatinine 1.13 (H) 0.50 - 1.05 mg/dL    eGFR 57 (L) >60 mL/min/1.73m*2    Calcium 9.5 8.6 - 10.6 mg/dL    Phosphorus 4.4 2.5 - 4.9 mg/dL    Albumin 3.4 3.4 - 5.0 g/dL   Magnesium   Result Value Ref Range    Magnesium 1.94 1.60 - 2.40 mg/dL   CBC and Auto Differential   Result Value Ref Range    WBC 6.7 4.4 - 11.3 x10*3/uL    nRBC 0.0 0.0 - 0.0 /100 WBCs    RBC 3.79 (L) 4.00 - 5.20 x10*6/uL    Hemoglobin 11.2 (L) 12.0 - 16.0 g/dL    Hematocrit 34.0 (L) 36.0 - 46.0 %    MCV 90 80 - 100 fL    MCH 29.6 26.0 - 34.0 pg    MCHC 32.9 32.0 - 36.0 g/dL    RDW 13.1 11.5 - 14.5 %    Platelets 286 150 - 450 x10*3/uL    Neutrophils % 43.4 40.0 - 80.0 %    Immature Granulocytes %, Automated 0.3 0.0 - 0.9 %    Lymphocytes % 43.1 13.0 - 44.0 %    Monocytes % 9.5 2.0 - 10.0 %    Eosinophils % 3.1 0.0 - 6.0 %    Basophils % 0.6 0.0 - 2.0 %    Neutrophils Absolute 2.92 1.20 - 7.70 x10*3/uL    Immature Granulocytes Absolute, Automated 0.02 0.00 - 0.70 x10*3/uL    Lymphocytes Absolute 2.90 1.20 - 4.80 x10*3/uL    Monocytes Absolute 0.64 0.10  - 1.00 x10*3/uL    Eosinophils Absolute 0.21 0.00 - 0.70 x10*3/uL    Basophils Absolute 0.04 0.00 - 0.10 x10*3/uL     *Note: Due to a large number of results and/or encounters for the requested time period, some results have not been displayed. A complete set of results can be found in Results Review.         Assessment & Plan    60 y.o.. female with PMHx of HTN and obstructive uropathy/nephrolithiasis s/p kidney transplant (DDKT 2015) on mycophenolate and tacrolimus, PRUDENCIO, Depression currently admitted for evaluation of UTI, hyponatremia.     Allograft kidney function:  - Her baseline creatinine is 0.7-0.9   - mild elevation in Cr this admission, ~1.1 likely sec to volume depletion.   - UA with pyuria, LE+. Ucx with E cloacae, susc pending.   - Hyponatremia improving. Likely sec to volume depletion. Maintain adequate hydration. Monitor Q12h.  - Bps slightly above goal this AM. Monitor and titrate mds as indicated  - Ca, Phos acceptable  - Hb ~11, at goal.   - avoid potential nephrotoxins.     Immunosuppression:  - Tac 2mg q12. Fk this AM 12, appears to be a true trough. Pls reduce tac to 2mg qam and 1mg qpm. Goal 5-8.  - reduce MMF to 750 mg bid while on abx.   - not on pred    ID: UTI  - Ucx with Enterobacter cloacae. Sensitivity pending  - abx per primary team.     Rest of the management per primary team. Will follow.       Ned Caruso MD

## 2025-06-09 NOTE — PROGRESS NOTES
Physical Therapy                 Therapy Communication Note/ PT Screen     Patient Name: Viki Diez  MRN: [id number]  Department: [place] [address]  Room: 2009/2009-A  Today's Date: 6/9/2025     Discipline: Physical Therapy    Pt screened for PT needs.  Pt up (I), ambulating off the floor, reports only having balance issues when not wearing her glasses.  Does not demo needs for PT services.  Will discharge PT order.      06/09/25 at 12:21 PM - Abbi Guy, PT

## 2025-06-09 NOTE — DISCHARGE INSTRUCTIONS
Discharge Instructions    Dear Viki Diez,    You were admitted to [facility] for a headache and pain with urination. While you were admitted, we found that you had very high blood pressure and made some adjustments to your medications. We also found that you had a urinary tract infection, and have been treating you with antibiotics. We asked our Transplant Kidney team to come see you while you were admitted, and they also made some medication changes, please see below. They would like you to get some blood work done and follow up in their clinic, detailed below. Since you were feeling better, we felt that you were ready to return home and take care of your pony, cat, and dog.    Medication changes:  START  Ciprofloxacin: Take 1 tablet twice a day for 1 week. This will treat your UTI  Tylenol: We have been giving you 3 tablets every 8 hours while you were admitted for your headaches. You can take 3 tablets up to every 8 hours as needed for headaches  Carvedilol (Coreg): Take 1 tablet twice a day, this is a new blood pressure medication  CHANGE  While you are on antibiotics (Cipro for 1 week), please take 3 tablets, 750mg, of your Mycophenolate (Cellcept) twice a day. After finishing antibiotics, you may go back to your normal dose of 4 tablets (1,000mg) twice a day  Amlodipine: We have increased this from 5mg to 10mg    Appointments/follow-up:     We have requested an automated system to call you to schedule, however if you do not hear from them in next few days, please call [facility] appointment line: [phone] or [phone]    The Transplant Kidney team will be reaching out to schedule an appointment for you within the next 2-3 weeks  You will need to get blood work done every week until your appointment to monitor your kidney function. You may present to any [facility] or  lab to get these done.  We have placed a referral for you to see your PCP, please call and schedule an appointment.    It was a  pleasure taking care of you,  Your [facility] Care Team

## 2025-06-09 NOTE — CARE PLAN
The patient's goals for the shift include      The clinical goals for the shift include patient will remain free from Headache and Maintain her Blood pressure within limit.    Over the shift, the patient did make progress toward the following goals. No any complaint of headache after receiving medicine.  Problem: Pain - Adult  Goal: Verbalizes/displays adequate comfort level or baseline comfort level  Outcome: Progressing     Problem: Discharge Planning  Goal: Discharge to home or other facility with appropriate resources  Outcome: Progressing     Problem: Safety - Adult  Goal: Free from fall injury  Outcome: Progressing

## 2025-06-09 NOTE — PROGRESS NOTES
Anesthesia Evaluation     Patient summary reviewed and Nursing notes reviewed   no history of anesthetic complications:  NPO Solid Status: > 8 hours  NPO Liquid Status: > 8 hours           Airway   Mallampati: II  TM distance: >3 FB  Neck ROM: full  Possible difficult intubation and Small opening  Dental - normal exam     Pulmonary    (+) a smoker (in process of quiting) Current,     ROS comment: snore  Cardiovascular   Exercise tolerance: excellent (>7 METS)    Rhythm: regular  Rate: normal        Neuro/Psych  (+) headaches (migraines),     GI/Hepatic/Renal/Endo    (+) morbid obesity,      Musculoskeletal     (+) back pain,   Abdominal   (+) obese,    Substance History   (+) drug use (90 days clean today)     OB/GYN negative ob/gyn ROS         Other - negative ROS                       Anesthesia Plan    ASA 3     general     intravenous induction   Anesthetic plan, all risks, benefits, and alternatives have been provided, discussed and informed consent has been obtained with: patient.  Use of blood products discussed with patient  Consented to blood products.      Viki Diez is a 60 y.o. female on day 2 of admission presenting with Hyponatremia.      Subjective   NAEON. Pt reported her headache that has been well-controlled with oxy and tylenol. Denied other acute complaint.        Objective     Last Recorded Vitals  /85 (BP Location: Right arm, Patient Position: Lying)   Pulse 62   Temp 36.3 °C (97.3 °F) (Temporal)   Resp 18   Wt 55.8 kg (123 lb)   SpO2 99%   Intake/Output last 3 Shifts:    Intake/Output Summary (Last 24 hours) at 6/9/2025 1308  Last data filed at 6/9/2025 1026  Gross per 24 hour   Intake 1592.09 ml   Output 2 ml   Net 1590.09 ml            Results from last 72 hours   Lab Units 06/09/25  0743 06/08/25  0555 06/07/25  1459   WBC AUTO x10*3/uL 6.7 12.7* 14.7*   HEMOGLOBIN g/dL 11.2* 12.1 12.7   MCV fL 90 90 84   MCH pg 29.6 29.2 30.1   MCHC g/dL 32.9 32.5 36.0   RDW % 13.1 13.3 13.0   PLATELETS AUTO x10*3/uL 286 289 272     Results from last 72 hours   Lab Units 06/09/25  0743 06/08/25  2308 06/08/25  1527 06/07/25  1459   GLUCOSE mg/dL 127* 167* 143* 169*   SODIUM mmol/L 131* 123* 127* 127*   POTASSIUM mmol/L 4.3 3.9 3.9 3.9   CHLORIDE mmol/L 100 95* 94* 95*   CO2 mmol/L 24 19* 22 20*   CREATININE mg/dL 1.13* 1.12* 1.04 1.12*   EGFR mL/min/1.73m*2 57* 57* 62 57*   CALCIUM mg/dL 9.5 9.9 10.2 9.9   ALBUMIN g/dL 3.4 3.6 4.0 4.1      @[id number](INR:7, PT:7, aPTT:7)@      US kidney transplant  Result Date: 6/8/2025  Interpreted By:  Juan Zamora and Kelly Rory STUDY: US KIDNEY TRANSPLANT;  6/8/2025 12:07 pm   INDICATION: Signs/Symptoms:MIL, dysuria, and hx of renal transplant..     COMPARISON: Renal transplant ultrasound dated 06/16/2023 and 07/14/2022.   ACCESSION NUMBER(S): [id number]   ORDERING CLINICIAN: CARLOS ENRIQUE HAJI   TECHNIQUE: Grayscale, color, and spectral Doppler of the kidney transplant were performed.   FINDINGS: TRANSPLANT KIDNEY: Transplant kidney location:  Right the transplant kidney fthmdyay89.8 cm in craniocaudal  dimension. There is no hydronephrosis and hydroureter. No perinephric fluid collections are seen.   DOPPLER EVALUATION:   RESISTIVE INDICES: The resistive indices were as follows: 0.69, 0.66 in the superior pole (previously 0.69, 0.70), 0.68, 0.68 in the midpole (previously 0.74, 0.78), and 0.65, 0.64 in the inferior pole (previously 0.72, 0.77). Wave forms are normal.   TRANSPLANT RENAL ARTERY AND VEIN: The main renal artery velocity is 70.0 cm/s, previously 135 cm/sec. The arterial anastomosis velocity is 75.5 cm/s, previously 135 cm/sec. The adjacent iliac artery velocity is 138.6 cm/s, previously 111 cm/sec. Transplant renal vein is patent. The peak velocity in the main renal vein is 42 cm/s (previously 32cm/sec), in the adjacent iliac vein 111.3 cm/s, previously 32 cm/sec. The venous anastomosis velocity is 119.7 cm/s.       Unremarkable ultrasound and Doppler evaluation of the transplant kidney as detailed above   I personally reviewed the images/study and I agree with the findings as stated by Isaias Urrutia MD (Radiology Resident).   MACRO: None   Signed by: Juan Zamora 6/8/2025 2:38 PM Dictation workstation:   [id number]    ECG 12 lead  Result Date: 6/8/2025  Sinus tachycardia Possible Left atrial enlargement Borderline ECG When compared with ECG of 17-DEC-2024 10:06, Non-specific change in ST segment in Inferior leads See ED provider note for full interpretation and clinical correlation Confirmed by Lisandra Stephens ([id number]) on 6/8/2025 1:44:39 AM    CT head wo IV contrast  Result Date: 6/7/2025  Interpreted By:  Vick Marquez, STUDY: CT HEAD WO IV CONTRAST;  6/7/2025 4:12 pm   INDICATION: Signs/Symptoms:headache, worsening over the course of months.     COMPARISON: MRI brain dated 12/17/2024.   ACCESSION NUMBER(S): [id number]   ORDERING CLINICIAN: TIN HOLDEN   TECHNIQUE: Noncontrast axial CT scan of head was performed. Angled reformats in brain and bone windows were generated. The images were  reviewed in bone, brain, blood and soft tissue windows.   FINDINGS: CSF Spaces: Mild generalized brain volume loss. No hydrocephalus.   Parenchyma: No CT evidence of acute territorial infarction or acute intracranial hemorrhage. No mass effect or midline shift. Confluent areas of T2/FLAIR hyperintensities in the periventricular and subcortical white matter, compatible with moderate to severe chronic white matter small vessel ischemic changes. Chronic ischemic infarct in the right-sided fredo. Limited evaluation of the brainstem and cerebellum.   Calvarium: The calvarium is unremarkable.   Paranasal sinuses and mastoids: Visualized paranasal sinuses and mastoids are clear.       1. No signs of acute territorial infarction or acute intracranial hemorrhage. Please note, CT is insensitive to detect small acute ischemic infarct. MR of the brain without contrast is recommended for further assessment.   2. Moderate to severe chronic white matter small vessel ischemic changes. Chronic ischemic infarct in the right-sided fredo.       MACRO: None     Signed by: Vick Marquez 6/7/2025 4:16 PM Dictation workstation:   [id number]      Assessment and plan   Viki Diez is a 59 y.o. female with  past medical history of HTN and obstructive uropathy/nephrolithiasis s/p kidney transplant (DDRT 2015) on mycophenolate and tacrolimus, PRUDENCIO, Depression, admitted as a case of UTI in context of immunosuppression and transplant history, on CTX. Transplant following.      6/9/25 Updates    - NS infusion stopped  - Urine Cx grew enterobacter cloacae complex   - switched to Tierney  - pending repeat RFP at 6pm     #UTI  #MIL  :: dysuria since one month, associated with urgency   :: exam shows suprapubic tenderness.   :: UA shows 500 LE, and negative nitrite, WBC 14.7 with ANC 11.98  :: Cr 1.12 ( baseline Cr 0.7) Urea: Cr ratio is less than 20:1, most likely pre-renal  :: Urine Cx grew enterobacter cloacae complex   :: renal us unremarkable    :: urine lytes consistent with pre-renal MIL     Plan:   - switched CTX to ammy  - follow urine culture    - transplant consulted, following         # c\f hypovolemic Hyponatremia  :: Serum and urine osmolality ordered  :: picture of asymptomatic hypovolumic hyponatremia.    - Accurate I&O  - Encourage oral intake         # HTN  #Hx of CVA  - Continue Amlodipine  - Start carvidolol 12.5  BID   - Continue atorvastatin  - continue ASA       # Obstructive uropathy/nephrolithiasis s/p kidney transplant (DDRT 2015) on mycophenolate and tacrolimus,   - Continue home dose of tacrolimus 2 mg twice daily, CellCept 1000 mg twice daily.  - transplant consulted, following       # PRUDENCIO, Depression.  # Insomnia  :: On Prozac 60 mg daily, lurasidone 40 mg daily, trazodone 200 mg nightly  :: The patient's lurasidone dose was recently increased in the past few weeks from 20 mg to 40 mg              F: Replete PRN  N: Regular diet  A: PIV  O2: On RA  DVT ppx: SubQ hep   GI ppx: -   Code Status: Full code  Contact: George Diez (Sibling)  [phone] (Work Phone)        Chadd Hoffman MD  PGY-1 Neurology

## 2025-06-09 NOTE — SIGNIFICANT EVENT
Transplant Nephrology entry     Patient off floor this morning on rounds  Serum Na 131 today   Ok to stop IVF  Will re-evaluate tomorrow    Armond Avalos MD   Nephrology fellow

## 2025-06-10 ENCOUNTER — APPOINTMENT (OUTPATIENT)
Dept: NEUROLOGY | Facility: HOSPITAL | Age: 60
End: 2025-06-10
Payer: MEDICARE

## 2025-06-10 ENCOUNTER — PHARMACY VISIT (OUTPATIENT)
Dept: PHARMACY | Facility: CLINIC | Age: 60
End: 2025-06-10
Payer: COMMERCIAL

## 2025-06-10 VITALS
OXYGEN SATURATION: 98 % | HEART RATE: 54 BPM | BODY MASS INDEX: 19.3 KG/M2 | SYSTOLIC BLOOD PRESSURE: 130 MMHG | WEIGHT: 123 LBS | HEIGHT: 67 IN | TEMPERATURE: 97.3 F | RESPIRATION RATE: 19 BRPM | DIASTOLIC BLOOD PRESSURE: 90 MMHG

## 2025-06-10 DIAGNOSIS — Z94.0 KIDNEY TRANSPLANT STATUS: Primary | ICD-10-CM

## 2025-06-10 LAB
ALBUMIN SERPL BCP-MCNC: 3.7 G/DL (ref 3.4–5)
ANION GAP SERPL CALC-SCNC: 12 MMOL/L (ref 10–20)
BACTERIA UR CULT: ABNORMAL
BACTERIA UR CULT: ABNORMAL
BASOPHILS # BLD AUTO: 0.07 X10*3/UL (ref 0–0.1)
BASOPHILS NFR BLD AUTO: 1.2 %
BUN SERPL-MCNC: 14 MG/DL (ref 6–23)
CALCIUM SERPL-MCNC: 10.3 MG/DL (ref 8.6–10.6)
CHLORIDE SERPL-SCNC: 102 MMOL/L (ref 98–107)
CO2 SERPL-SCNC: 27 MMOL/L (ref 21–32)
CREAT SERPL-MCNC: 0.84 MG/DL (ref 0.5–1.05)
EGFRCR SERPLBLD CKD-EPI 2021: 81 ML/MIN/1.73M*2
EOSINOPHIL # BLD AUTO: 0.24 X10*3/UL (ref 0–0.7)
EOSINOPHIL NFR BLD AUTO: 4.1 %
ERYTHROCYTE [DISTWIDTH] IN BLOOD BY AUTOMATED COUNT: 13.1 % (ref 11.5–14.5)
GLUCOSE SERPL-MCNC: 115 MG/DL (ref 74–99)
HCT VFR BLD AUTO: 34.4 % (ref 36–46)
HGB BLD-MCNC: 11.4 G/DL (ref 12–16)
IMM GRANULOCYTES # BLD AUTO: 0.01 X10*3/UL (ref 0–0.7)
IMM GRANULOCYTES NFR BLD AUTO: 0.2 % (ref 0–0.9)
LYMPHOCYTES # BLD AUTO: 3.29 X10*3/UL (ref 1.2–4.8)
LYMPHOCYTES NFR BLD AUTO: 55.9 %
MAGNESIUM SERPL-MCNC: 1.39 MG/DL (ref 1.6–2.4)
MCH RBC QN AUTO: 29.5 PG (ref 26–34)
MCHC RBC AUTO-ENTMCNC: 33.1 G/DL (ref 32–36)
MCV RBC AUTO: 89 FL (ref 80–100)
MONOCYTES # BLD AUTO: 0.52 X10*3/UL (ref 0.1–1)
MONOCYTES NFR BLD AUTO: 8.8 %
NEUTROPHILS # BLD AUTO: 1.76 X10*3/UL (ref 1.2–7.7)
NEUTROPHILS NFR BLD AUTO: 29.8 %
NRBC BLD-RTO: 0 /100 WBCS (ref 0–0)
PHOSPHATE SERPL-MCNC: 3.9 MG/DL (ref 2.5–4.9)
PLATELET # BLD AUTO: 324 X10*3/UL (ref 150–450)
POTASSIUM SERPL-SCNC: 5.1 MMOL/L (ref 3.5–5.3)
RBC # BLD AUTO: 3.87 X10*6/UL (ref 4–5.2)
SODIUM SERPL-SCNC: 136 MMOL/L (ref 136–145)
TACROLIMUS BLD-MCNC: 9.6 NG/ML
WBC # BLD AUTO: 5.9 X10*3/UL (ref 4.4–11.3)

## 2025-06-10 PROCEDURE — 80197 ASSAY OF TACROLIMUS: CPT

## 2025-06-10 PROCEDURE — 83735 ASSAY OF MAGNESIUM: CPT

## 2025-06-10 PROCEDURE — RXMED WILLOW AMBULATORY MEDICATION CHARGE

## 2025-06-10 PROCEDURE — 85025 COMPLETE CBC W/AUTO DIFF WBC: CPT

## 2025-06-10 PROCEDURE — 2500000002 HC RX 250 W HCPCS SELF ADMINISTERED DRUGS (ALT 637 FOR MEDICARE OP, ALT 636 FOR OP/ED)

## 2025-06-10 PROCEDURE — 2500000001 HC RX 250 WO HCPCS SELF ADMINISTERED DRUGS (ALT 637 FOR MEDICARE OP)

## 2025-06-10 PROCEDURE — 99233 SBSQ HOSP IP/OBS HIGH 50: CPT

## 2025-06-10 PROCEDURE — 2500000004 HC RX 250 GENERAL PHARMACY W/ HCPCS (ALT 636 FOR OP/ED)

## 2025-06-10 PROCEDURE — 80069 RENAL FUNCTION PANEL: CPT

## 2025-06-10 PROCEDURE — 2500000001 HC RX 250 WO HCPCS SELF ADMINISTERED DRUGS (ALT 637 FOR MEDICARE OP): Performed by: HOSPITALIST

## 2025-06-10 PROCEDURE — 36415 COLL VENOUS BLD VENIPUNCTURE: CPT

## 2025-06-10 RX ORDER — CARVEDILOL 25 MG/1
25 TABLET ORAL 2 TIMES DAILY
Qty: 60 TABLET | Refills: 1 | Status: SHIPPED | OUTPATIENT
Start: 2025-06-10 | End: 2025-08-09

## 2025-06-10 RX ORDER — AMLODIPINE BESYLATE 5 MG/1
10 TABLET ORAL DAILY
Qty: 60 TABLET | Refills: 11 | Status: SHIPPED | OUTPATIENT
Start: 2025-06-10

## 2025-06-10 RX ORDER — MYCOPHENOLATE MOFETIL 250 MG/1
CAPSULE ORAL
Qty: 282 CAPSULE | Refills: 0 | Status: SHIPPED | OUTPATIENT
Start: 2025-06-10 | End: 2025-07-17

## 2025-06-10 RX ORDER — ACETAMINOPHEN 325 MG/1
975 TABLET ORAL EVERY 8 HOURS PRN
Qty: 100 TABLET | Refills: 0 | Status: SHIPPED | OUTPATIENT
Start: 2025-06-10

## 2025-06-10 RX ORDER — CARVEDILOL 12.5 MG/1
12.5 TABLET ORAL ONCE
Status: COMPLETED | OUTPATIENT
Start: 2025-06-10 | End: 2025-06-10

## 2025-06-10 RX ORDER — MAGNESIUM SULFATE HEPTAHYDRATE 40 MG/ML
4 INJECTION, SOLUTION INTRAVENOUS ONCE
Status: COMPLETED | OUTPATIENT
Start: 2025-06-10 | End: 2025-06-10

## 2025-06-10 RX ORDER — CARVEDILOL 25 MG/1
25 TABLET ORAL 2 TIMES DAILY
Status: DISCONTINUED | OUTPATIENT
Start: 2025-06-10 | End: 2025-06-10 | Stop reason: HOSPADM

## 2025-06-10 RX ORDER — MAGNESIUM SULFATE HEPTAHYDRATE 40 MG/ML
2 INJECTION, SOLUTION INTRAVENOUS ONCE
Status: DISCONTINUED | OUTPATIENT
Start: 2025-06-10 | End: 2025-06-10

## 2025-06-10 RX ORDER — CIPROFLOXACIN 750 MG/1
750 TABLET, FILM COATED ORAL 2 TIMES DAILY
Qty: 14 TABLET | Refills: 0 | Status: SHIPPED | OUTPATIENT
Start: 2025-06-10 | End: 2025-06-17

## 2025-06-10 RX ADMIN — ASPIRIN 81 MG: 81 TABLET, CHEWABLE ORAL at 08:58

## 2025-06-10 RX ADMIN — HEPARIN SODIUM 5000 UNITS: 5000 INJECTION, SOLUTION INTRAVENOUS; SUBCUTANEOUS at 13:50

## 2025-06-10 RX ADMIN — PANTOPRAZOLE SODIUM 20 MG: 20 TABLET, DELAYED RELEASE ORAL at 06:18

## 2025-06-10 RX ADMIN — LURASIDONE HYDROCHLORIDE 40 MG: 40 TABLET, FILM COATED ORAL at 12:01

## 2025-06-10 RX ADMIN — ACETAMINOPHEN 975 MG: 325 TABLET ORAL at 08:58

## 2025-06-10 RX ADMIN — CARVEDILOL 12.5 MG: 12.5 TABLET, FILM COATED ORAL at 12:01

## 2025-06-10 RX ADMIN — HEPARIN SODIUM 5000 UNITS: 5000 INJECTION, SOLUTION INTRAVENOUS; SUBCUTANEOUS at 06:15

## 2025-06-10 RX ADMIN — MAGNESIUM SULFATE HEPTAHYDRATE 4 G: 40 INJECTION, SOLUTION INTRAVENOUS at 12:05

## 2025-06-10 RX ADMIN — OXYCODONE 5 MG: 5 TABLET ORAL at 12:05

## 2025-06-10 RX ADMIN — TACROLIMUS 2 MG: 1 CAPSULE ORAL at 06:15

## 2025-06-10 RX ADMIN — FLUOXETINE HYDROCHLORIDE 60 MG: 40 CAPSULE ORAL at 12:01

## 2025-06-10 RX ADMIN — POLYETHYLENE GLYCOL 3350 17 G: 17 POWDER, FOR SOLUTION ORAL at 08:58

## 2025-06-10 RX ADMIN — ACETAMINOPHEN 975 MG: 325 TABLET ORAL at 13:50

## 2025-06-10 RX ADMIN — MEROPENEM AND SODIUM CHLORIDE 1 G: 1 INJECTION, SOLUTION INTRAVENOUS at 12:01

## 2025-06-10 RX ADMIN — CARVEDILOL 12.5 MG: 12.5 TABLET, FILM COATED ORAL at 08:58

## 2025-06-10 RX ADMIN — MYCOPHENOLATE MOFETIL 1000 MG: 250 CAPSULE ORAL at 08:58

## 2025-06-10 ASSESSMENT — PAIN - FUNCTIONAL ASSESSMENT
PAIN_FUNCTIONAL_ASSESSMENT: 0-10

## 2025-06-10 ASSESSMENT — PAIN SCALES - GENERAL
PAINLEVEL_OUTOF10: 5 - MODERATE PAIN
PAINLEVEL_OUTOF10: 8
PAINLEVEL_OUTOF10: 0 - NO PAIN

## 2025-06-10 NOTE — PROGRESS NOTES
Viki Diez   60 y.o.    MRN/Room: [id number]/2009/2009-A    Subjective:   Complaining of headache this morning, but otherwise well     Objective:     Meds:   acetaminophen, 975 mg, q8h  amLODIPine, 10 mg, Nightly  aspirin, 81 mg, Daily  atorvastatin, 80 mg, Nightly  carvedilol, 25 mg, BID  FLUoxetine, 60 mg, Daily with lunch  heparin (porcine), 5,000 Units, q8h SHERICE  lurasidone, 40 mg, Daily with lunch  magnesium sulfate, 4 g, Once  meropenem, 1 g, q12h  mycophenolate, 1,000 mg, q12h  pantoprazole, 20 mg, Daily before breakfast  polyethylene glycol, 17 g, Daily  tacrolimus, 2 mg, q12h SHERICE  traZODone, 200 mg, Nightly         hydrALAZINE, 25 mg, q8h PRN  metoclopramide, 10 mg, q8h PRN  ondansetron ODT, 4 mg, q8h PRN  oxyCODONE, 5 mg, q6h PRN        Vitals:    06/10/25 1148   BP: 130/90   Pulse: 54   Resp: 19   Temp: 36.3 °C (97.3 °F)   SpO2: 98%          Intake/Output Summary (Last 24 hours) at 6/10/2025 1323  Last data filed at 6/10/2025 1317  Gross per 24 hour   Intake 877.5 ml   Output --   Net 877.5 ml       General appearance: no distress  Eyes: non-icteric  Skin: no apparent rash  Heart: S1 S2 regular  Lungs: CTA bilaterally, No wheezing/crackles  Abdomen: soft, nt/nd  Extremities: No edema bilaterally  Neuro: No FND,asterixis      Blood Labs:  Results for orders placed or performed during the hospital encounter of 06/07/25 (from the past 24 hours)   Renal function panel   Result Value Ref Range    Glucose 130 (H) 74 - 99 mg/dL    Sodium 130 (L) 136 - 145 mmol/L    Potassium 4.8 3.5 - 5.3 mmol/L    Chloride 99 98 - 107 mmol/L    Bicarbonate 24 21 - 32 mmol/L    Anion Gap 12 10 - 20 mmol/L    Urea Nitrogen 13 6 - 23 mg/dL    Creatinine 1.01 0.50 - 1.05 mg/dL    eGFR 65 >60 mL/min/1.73m*2    Calcium 9.7 8.6 - 10.6 mg/dL    Phosphorus 3.7 2.5 - 4.9 mg/dL    Albumin 3.6 3.4 - 5.0 g/dL   Tacrolimus level   Result Value Ref Range    Tacrolimus  9.6 <=15.0 ng/mL   CBC and Auto Differential   Result Value Ref Range     WBC 5.9 4.4 - 11.3 x10*3/uL    nRBC 0.0 0.0 - 0.0 /100 WBCs    RBC 3.87 (L) 4.00 - 5.20 x10*6/uL    Hemoglobin 11.4 (L) 12.0 - 16.0 g/dL    Hematocrit 34.4 (L) 36.0 - 46.0 %    MCV 89 80 - 100 fL    MCH 29.5 26.0 - 34.0 pg    MCHC 33.1 32.0 - 36.0 g/dL    RDW 13.1 11.5 - 14.5 %    Platelets 324 150 - 450 x10*3/uL    Neutrophils % 29.8 40.0 - 80.0 %    Immature Granulocytes %, Automated 0.2 0.0 - 0.9 %    Lymphocytes % 55.9 13.0 - 44.0 %    Monocytes % 8.8 2.0 - 10.0 %    Eosinophils % 4.1 0.0 - 6.0 %    Basophils % 1.2 0.0 - 2.0 %    Neutrophils Absolute 1.76 1.20 - 7.70 x10*3/uL    Immature Granulocytes Absolute, Automated 0.01 0.00 - 0.70 x10*3/uL    Lymphocytes Absolute 3.29 1.20 - 4.80 x10*3/uL    Monocytes Absolute 0.52 0.10 - 1.00 x10*3/uL    Eosinophils Absolute 0.24 0.00 - 0.70 x10*3/uL    Basophils Absolute 0.07 0.00 - 0.10 x10*3/uL   Magnesium   Result Value Ref Range    Magnesium 1.39 (L) 1.60 - 2.40 mg/dL   Renal Function Panel   Result Value Ref Range    Glucose 115 (H) 74 - 99 mg/dL    Sodium 136 136 - 145 mmol/L    Potassium 5.1 3.5 - 5.3 mmol/L    Chloride 102 98 - 107 mmol/L    Bicarbonate 27 21 - 32 mmol/L    Anion Gap 12 10 - 20 mmol/L    Urea Nitrogen 14 6 - 23 mg/dL    Creatinine 0.84 0.50 - 1.05 mg/dL    eGFR 81 >60 mL/min/1.73m*2    Calcium 10.3 8.6 - 10.6 mg/dL    Phosphorus 3.9 2.5 - 4.9 mg/dL    Albumin 3.7 3.4 - 5.0 g/dL     *Note: Due to a large number of results and/or encounters for the requested time period, some results have not been displayed. A complete set of results can be found in Results Review.     ASSESSMENT:  Viki Diez is a  60 y.o. F with PMH HTN and obstructive uropathy/nephrolithiasis s/p kidney transplant (DDKT 2015) on mycophenolate and tacrolimus, PRUDENCIO, Depression currently admitted for evaluation of UTI, hyponatremia.     #Allograft function  -Stable, Cr 0.8 today    #Electrolytes  -Hyponatremia resolved    #Immunosuppression  -Tacrolimus 2mg po bid  -MMF 1000mg po  bid  -Not on Prednisone     #UTI  -Management as per primary team    Armond Avalos MD  Nephrology Fellow

## 2025-06-10 NOTE — PROGRESS NOTES
06/10/25 1006   Rapid Rounds   Attendance Provider;Care Transitions   Expected Discharge Disposition Home   Today we still await: Clinical stability   Review at Escalation Rounds Clinically complex

## 2025-06-10 NOTE — PROGRESS NOTES
Viki Diez is a 60 y.o. female on day 3 of admission presenting with Hyponatremia.      Subjective   NAEON. VS with elevated BP. Still reported headache, denied CP, SOB, vision changes, photophobia or phonophobia.        Objective     Last Recorded Vitals  /90 (BP Location: Left arm, Patient Position: Sitting)   Pulse 54   Temp 36.3 °C (97.3 °F) (Temporal)   Resp 19   Wt 55.8 kg (123 lb)   SpO2 98%   Intake/Output last 3 Shifts:    Intake/Output Summary (Last 24 hours) at 6/10/2025 1253  Last data filed at 6/10/2025 0741  Gross per 24 hour   Intake 777.5 ml   Output --   Net 777.5 ml            Results from last 72 hours   Lab Units 06/10/25  0457 06/09/25  0743 06/08/25  0555 06/07/25  1459   WBC AUTO x10*3/uL 5.9 6.7 12.7* 14.7*   HEMOGLOBIN g/dL 11.4* 11.2* 12.1 12.7   MCV fL 89 90 90 84   MCH pg 29.5 29.6 29.2 30.1   MCHC g/dL 33.1 32.9 32.5 36.0   RDW % 13.1 13.1 13.3 13.0   PLATELETS AUTO x10*3/uL 324 286 289 272     Results from last 72 hours   Lab Units 06/10/25  0457 06/09/25  1956 06/09/25  0743 06/08/25  2308 06/08/25  1527 06/07/25  1459   GLUCOSE mg/dL 115* 130* 127* 167* 143* 169*   SODIUM mmol/L 136 130* 131* 123* 127* 127*   POTASSIUM mmol/L 5.1 4.8 4.3 3.9 3.9 3.9   CHLORIDE mmol/L 102 99 100 95* 94* 95*   CO2 mmol/L 27 24 24 19* 22 20*   CREATININE mg/dL 0.84 1.01 1.13* 1.12* 1.04 1.12*   EGFR mL/min/1.73m*2 81 65 57* 57* 62 57*   CALCIUM mg/dL 10.3 9.7 9.5 9.9 10.2 9.9   ALBUMIN g/dL 3.7 3.6 3.4 3.6 4.0 4.1      @[id number](INR:7, PT:7, aPTT:7)@      No results found.      Assessment and plan   Viki Diez is a 59 y.o. female with  past medical history of HTN and obstructive uropathy/nephrolithiasis s/p kidney transplant (DDRT 2015) on mycophenolate and tacrolimus, PRUDENCIO, Depression, admitted as a case of UTI in context of immunosuppression and transplant history, on CTX. Transplant following.      6/10/25 Updates    - NS infusion stopped with Na 136 today  - Urine Cx grew  enterobacter cloacae complex, pending speciation   - pending transplant recs     #UTI  #MIL  :: dysuria since one month, associated with urgency   :: exam shows suprapubic tenderness.   :: UA shows 500 LE, and negative nitrite, WBC 14.7 with ANC 11.98  :: Cr 1.12 ( baseline Cr 0.7) Urea: Cr ratio is less than 20:1, most likely pre-renal  :: Urine Cx grew enterobacter cloacae complex   :: renal us unremarkable   :: urine lytes consistent with pre-renal MIL     Plan:   - switched CTX to ammy  - follow urine culture    - transplant consulted, following         # c\f hypovolemic Hyponatremia  :: Serum and urine osmolality ordered  :: picture of asymptomatic hypovolumic hyponatremia.    - Accurate I&O  - Encourage oral intake         # HTN  #Hx of CVA  - Continue Amlodipine  - Start carvidolol 12.5  BID   - Continue atorvastatin  - continue ASA       # Obstructive uropathy/nephrolithiasis s/p kidney transplant (DDRT 2015) on mycophenolate and tacrolimus,   - Continue home dose of tacrolimus 2 mg twice daily, CellCept 1000 mg twice daily.  - transplant consulted, following       # PRUDENCIO, Depression.  # Insomnia  :: On Prozac 60 mg daily, lurasidone 40 mg daily, trazodone 200 mg nightly  :: The patient's lurasidone dose was recently increased in the past few weeks from 20 mg to 40 mg              F: Replete PRN  N: Regular diet  A: PIV  O2: On RA  DVT ppx: SubQ hep   GI ppx: -   Code Status: Full code  Contact: George Diez (Sibling)  [phone] (Work Phone)        Chadd Hoffman MD  PGY-1 Neurology

## 2025-06-10 NOTE — PROGRESS NOTES
Occupational Therapy                 Therapy Communication Note    Patient Name: Viki Diez  MRN: [id number]  Department: [facility]  Room: 2009/2009-A  Today's Date: 6/10/2025     Discipline: Occupational Therapy    Missed Visit Reason: Missed Visit Reason: Other (Comment) (OT SCREEN: per pt has been up ad wes and I with ADLs since admission, will d/c orders)    Justin Alcocer, OTR/L

## 2025-06-10 NOTE — CARE PLAN
The patient's goals for the shift include having decreased headache.      The clinical goals for the shift include Pt's sodium level will be WNL this shift.    Over the shift, the patient did make progress toward the following goals. She reported decreased pain after receiving her PRN pain medication. Pt's sodium level was also 136 today.       Problem: Pain - Adult  Goal: Verbalizes/displays adequate comfort level or baseline comfort level  Outcome: Progressing     Problem: Safety - Adult  Goal: Free from fall injury  Outcome: Progressing     Problem: Discharge Planning  Goal: Discharge to home or other facility with appropriate resources  Outcome: Progressing     Problem: Chronic Conditions and Co-morbidities  Goal: Patient's chronic conditions and co-morbidity symptoms are monitored and maintained or improved  Outcome: Progressing     Problem: Nutrition  Goal: Nutrient intake appropriate for maintaining nutritional needs  Outcome: Progressing

## 2025-06-10 NOTE — CARE PLAN
The patient's goals for the shift include      The clinical goals for the shift include Pt's sodium will be WNL by the end of this shift.    Over the shift, the patient did not make progress toward the following goals.   Problem: Pain - Adult  Goal: Verbalizes/displays adequate comfort level or baseline comfort level  6/10/2025 0117 by Keily Cuellar RN  Outcome: Progressing  6/10/2025 0116 by Keily Cuellar RN  Outcome: Progressing     Problem: Safety - Adult  Goal: Free from fall injury  6/10/2025 0117 by Keily Cuellar RN  Outcome: Progressing  6/10/2025 0116 by Keily Cuellar RN  Outcome: Progressing     Problem: Discharge Planning  Goal: Discharge to home or other facility with appropriate resources  6/10/2025 0117 by Keily Cuellar RN  Outcome: Progressing  6/10/2025 0116 by Keily Cuellar RN  Outcome: Progressing     Problem: Chronic Conditions and Co-morbidities  Goal: Patient's chronic conditions and co-morbidity symptoms are monitored and maintained or improved  6/10/2025 0117 by Keily Cuellar RN  Outcome: Progressing  6/10/2025 0116 by Keily Cuellar RN  Outcome: Progressing     Problem: Nutrition  Goal: Nutrient intake appropriate for maintaining nutritional needs  6/10/2025 0117 by Keily Cuellar RN  Outcome: Progressing  6/10/2025 0116 by Keily Cuellar RN  Outcome: Progressing

## 2025-06-11 ENCOUNTER — PATIENT OUTREACH (OUTPATIENT)
Dept: PRIMARY CARE | Facility: CLINIC | Age: 60
End: 2025-06-11
Payer: MEDICARE

## 2025-06-11 NOTE — DISCHARGE SUMMARY
Discharge Diagnosis  UTI    Hyponatremia, resolved     Issues Requiring Follow-Up    - PCP  - RFP in a week   - Kidney transplant team    Discharge Meds     Medication List      START taking these medications     acetaminophen 325 mg tablet; Commonly known as: Tylenol; Take 3 tablets   (975 mg) by mouth every 8 hours if needed for mild pain (1 - 3), moderate   pain (4 - 6) or headaches.   atorvastatin 80 mg tablet; Commonly known as: Lipitor; Take 1 tablet (80   mg) by mouth once daily.   carvedilol 25 mg tablet; Commonly known as: Coreg; Take 1 tablet (25 mg)   by mouth 2 times a day.   ciprofloxacin 750 mg tablet; Commonly known as: Cipro; Take 1 tablet   (750 mg) by mouth 2 times a day for 7 days.     CHANGE how you take these medications     amLODIPine 5 mg tablet; Commonly known as: Norvasc; Take 2 tablets (10   mg) by mouth once daily.; What changed: how much to take   mycophenolate 250 mg capsule; Commonly known as: Cellcept; Take 3   capsules (750 mg) by mouth every 12 hours for 7 days, THEN 4 capsules   (1,000 mg) every 12 hours.; Start taking on: Parul 10, 2025; What changed:   See the new instructions.   omeprazole 20 mg DR capsule; Commonly known as: PriLOSEC; Take 1 capsule   (20 mg) by mouth once daily in the morning. Take before meals.; What   changed: when to take this     CONTINUE taking these medications     aspirin 81 mg chewable tablet; Chew 1 tablet (81 mg) once daily.   * FLUoxetine 20 mg capsule; Commonly known as: PROzac; Take 1 capsule   (20 mg) by mouth once daily. At noon   * FLUoxetine 40 mg capsule; Commonly known as: PROzac; TAKE ONE CAPSULE   BY MOUTH EVERY DAY   lurasidone 40 mg tablet; Commonly known as: Latuda; TAKE ONE TABLET BY   MOUTH EVERY DAY at noon. take with food   magnesium oxide 400 mg (241.3 mg elemental) tablet; Commonly known as:   Mag-Ox; Take 1 tablet (400 mg) by mouth 2 times a day.   ondansetron ODT 4 mg disintegrating tablet; Commonly known as:   Zofran-ODT    tacrolimus 1 mg capsule; Commonly known as: Prograf; Take 2 capsules (2   mg) by mouth every 12 hours.   traZODone 100 mg tablet; Commonly known as: Desyrel; TAKE TWO TABLETS BY   MOUTH once a DAY AT BEDTIME  * This list has 2 medication(s) that are the same as other medications   prescribed for you. Read the directions carefully, and ask your doctor or   other care provider to review them with you.       Test Results Pending At Discharge  Pending Labs       No current pending labs.            Hospital Course  Viki Diez is a 60 y.o. female with  past medical history of HTN and obstructive uropathy/nephrolithiasis s/p kidney transplant (DDRT 2015) on mycophenolate and tacrolimus, PRUDENCIO, Depression, who admitted as a case of UTI\hyponatremia for management and work up. Patient was in her usual status of health until 2 days ago, when she start recurrent headaches, and vomiting. She was not able to eat or drink because of vomiting. Patient admit dysuria in the last month, however she did not seek medical care and have not been started on antibiotics.      Does not report any dysarthria, difficulty swallowing, any mouth deviation. Denies any photophobia, neck rigidity, fever, neck pain or back pain, no abnormal movements, denies any flulike symptoms, or muscle aches.  No rashes or joint pain.  No chest pain, palpitation, shortness of breath or cough. She denies having any previous similar episodes. No GI or urinary symptoms. No history of recent travel, no contact with sick patients.      In the ED:  Vital signs: Afebrile, heart rate 70s to 80s, on room air saturating 100%, blood pressure 150s/ 70s  Labs: WBC 14.2, hemoglobin 12.6, platelets 323  Sodium 129, potassium 4, bicarb 22, creatinine 0.7, BUN 14, ALT and AST 4/9  Imaging: No acute intracranial abnormality.   EKG: NSR  ED Course: Given 1 dose of Tylenol 975 mg, Reglan 5 mg once, 1 L of RL, 1 dose of CXT, 25mg of diphenylenimine.    On the floor, pt still had  hyponatremia, and IVF was started. Transplant nephrology was consulted, and recommended hydration for the pre-renal MIL. Urine Cx grew enterobacter cloacae complex, CTX was switched to meropenem on 6/9. Pt's hyponatremia was improving and IVF was stopped. Pt has been HDS, and will be discharged home with cipro for 1 week.     Pertinent Physical Exam At Time of Discharge    Physical exam    General: awake, alert, conversant  HEENT: pupils equal and round, no scleral icterus or conjunctivitis, EOMI   Skin: no suspect lesions or rashes noted on visible skin  Chest: ctab, normal respiratory effort, not on supplemental oxygen  Cardiac: regular rate and rhythm, normal s1, s2, no M/R/G, no JVD  Abdomen: soft, ND, NT, no involuntary guarding  : no flank pain or indwelling urinary catheter  EXT: no peripheral edema, no asymmetry noted  MSK: no focal joint swelling noted  Neuro: AOx4, moving all limbs spontaneously, follows commands  Psych: coherent thought process, appropriate mood and affect    Outpatient Follow-Up    - PCP, a referral has been made  - Transplant team, an appointment will be scheduled       Chadd Hoffman MD  PGY-1 Neurology

## 2025-06-11 NOTE — PROGRESS NOTES
Discharge Facility: [facility], [place]  Discharge Diagnosis: UTI  Hyponatremia, resolved   Admission Date: 6/7/25  Discharge Date: 6/10/25    PCP Appointment Date: tasked to office  Specialist Appointment Date: TBD  Hospital Encounter and Summary Linked: Yes  ED to Hosp-Admission (Discharged) with Lidya Rachel MD; Randy Pardo MD (06/07/2025)     Two attempts were made to reach patient within two business days after discharge. Left voicemail with contact information for patient to call back with any non-emergent questions or concerns.

## 2025-06-12 DIAGNOSIS — Z94.0 KIDNEY REPLACED BY TRANSPLANT (HHS-HCC): ICD-10-CM

## 2025-06-13 ENCOUNTER — TELEPHONE (OUTPATIENT)
Facility: HOSPITAL | Age: 60
End: 2025-06-13

## 2025-06-13 DIAGNOSIS — Z94.0 KIDNEY TRANSPLANT STATUS: ICD-10-CM

## 2025-06-13 NOTE — TELEPHONE ENCOUNTER
I called patient to get her scheduled for an appointment with nephrology but was unable to reach patient but left a voicemail.

## 2025-06-20 DIAGNOSIS — Z94.0 KIDNEY TRANSPLANT STATUS: ICD-10-CM

## 2025-06-24 ENCOUNTER — APPOINTMENT (OUTPATIENT)
Dept: PRIMARY CARE | Facility: CLINIC | Age: 60
End: 2025-06-24
Payer: MEDICARE

## 2025-06-26 ENCOUNTER — PATIENT OUTREACH (OUTPATIENT)
Dept: PRIMARY CARE | Facility: CLINIC | Age: 60
End: 2025-06-26
Payer: MEDICARE

## 2025-06-27 DIAGNOSIS — Z94.0 KIDNEY TRANSPLANT STATUS: ICD-10-CM

## 2025-07-07 DIAGNOSIS — F33.8 OTHER RECURRENT DEPRESSIVE DISORDERS: ICD-10-CM

## 2025-07-07 DIAGNOSIS — G47.00 INSOMNIA, UNSPECIFIED TYPE: ICD-10-CM

## 2025-07-07 RX ORDER — FLUOXETINE HYDROCHLORIDE 40 MG/1
40 CAPSULE ORAL DAILY
Qty: 30 CAPSULE | Refills: 0 | Status: SHIPPED | OUTPATIENT
Start: 2025-07-07

## 2025-07-07 RX ORDER — LURASIDONE HYDROCHLORIDE 40 MG/1
TABLET, FILM COATED ORAL
Qty: 30 TABLET | Refills: 0 | Status: SHIPPED | OUTPATIENT
Start: 2025-07-07

## 2025-07-07 RX ORDER — TRAZODONE HYDROCHLORIDE 100 MG/1
TABLET ORAL
Qty: 60 TABLET | Refills: 0 | Status: SHIPPED | OUTPATIENT
Start: 2025-07-07

## 2025-07-08 ENCOUNTER — APPOINTMENT (OUTPATIENT)
Dept: PRIMARY CARE | Facility: CLINIC | Age: 60
End: 2025-07-08
Payer: MEDICARE

## 2025-07-08 VITALS
BODY MASS INDEX: 18.55 KG/M2 | TEMPERATURE: 98.2 F | DIASTOLIC BLOOD PRESSURE: 90 MMHG | RESPIRATION RATE: 20 BRPM | SYSTOLIC BLOOD PRESSURE: 138 MMHG | HEIGHT: 67 IN | OXYGEN SATURATION: 98 % | WEIGHT: 118.2 LBS | HEART RATE: 65 BPM

## 2025-07-08 DIAGNOSIS — I63.9 LEFT PONTINE STROKE (MULTI): ICD-10-CM

## 2025-07-08 DIAGNOSIS — F33.8 OTHER RECURRENT DEPRESSIVE DISORDERS: ICD-10-CM

## 2025-07-08 DIAGNOSIS — I10 HYPERTENSION, UNSPECIFIED TYPE: ICD-10-CM

## 2025-07-08 DIAGNOSIS — K21.9 GASTROESOPHAGEAL REFLUX DISEASE, UNSPECIFIED WHETHER ESOPHAGITIS PRESENT: ICD-10-CM

## 2025-07-08 DIAGNOSIS — E83.42 HYPOMAGNESEMIA: ICD-10-CM

## 2025-07-08 DIAGNOSIS — Z00.00 HEALTH MAINTENANCE EXAMINATION: ICD-10-CM

## 2025-07-08 DIAGNOSIS — N39.0 URINARY TRACT INFECTION WITHOUT HEMATURIA, SITE UNSPECIFIED: Primary | ICD-10-CM

## 2025-07-08 DIAGNOSIS — E87.1 HYPONATREMIA: ICD-10-CM

## 2025-07-08 PROBLEM — M25.519 SHOULDER PAIN: Status: ACTIVE | Noted: 2025-07-08

## 2025-07-08 LAB
POC APPEARANCE, URINE: CLEAR
POC BILIRUBIN, URINE: NEGATIVE
POC BLOOD, URINE: NEGATIVE
POC COLOR, URINE: YELLOW
POC GLUCOSE, URINE: NEGATIVE MG/DL
POC KETONES, URINE: NEGATIVE MG/DL
POC LEUKOCYTES, URINE: NEGATIVE
POC NITRITE,URINE: NEGATIVE
POC PH, URINE: 5.5 PH
POC PROTEIN, URINE: ABNORMAL MG/DL
POC SPECIFIC GRAVITY, URINE: 1.02
POC UROBILINOGEN, URINE: 0.2 EU/DL

## 2025-07-08 PROCEDURE — G2211 COMPLEX E/M VISIT ADD ON: HCPCS | Performed by: FAMILY MEDICINE

## 2025-07-08 PROCEDURE — 3075F SYST BP GE 130 - 139MM HG: CPT | Performed by: FAMILY MEDICINE

## 2025-07-08 PROCEDURE — 1036F TOBACCO NON-USER: CPT | Performed by: FAMILY MEDICINE

## 2025-07-08 PROCEDURE — 81003 URINALYSIS AUTO W/O SCOPE: CPT | Performed by: FAMILY MEDICINE

## 2025-07-08 PROCEDURE — 99214 OFFICE O/P EST MOD 30 MIN: CPT | Performed by: FAMILY MEDICINE

## 2025-07-08 PROCEDURE — 3080F DIAST BP >= 90 MM HG: CPT | Performed by: FAMILY MEDICINE

## 2025-07-08 PROCEDURE — 3008F BODY MASS INDEX DOCD: CPT | Performed by: FAMILY MEDICINE

## 2025-07-08 RX ORDER — OMEPRAZOLE 20 MG/1
20 CAPSULE, DELAYED RELEASE ORAL
Qty: 90 CAPSULE | Refills: 0 | Status: SHIPPED | OUTPATIENT
Start: 2025-07-08 | End: 2026-07-08

## 2025-07-08 RX ORDER — FLUOXETINE HYDROCHLORIDE 40 MG/1
40 CAPSULE ORAL DAILY
Qty: 30 CAPSULE | Refills: 0 | Status: CANCELLED | OUTPATIENT
Start: 2025-07-08

## 2025-07-08 RX ORDER — AMLODIPINE BESYLATE 5 MG/1
10 TABLET ORAL DAILY
Qty: 60 TABLET | Refills: 11 | Status: CANCELLED | OUTPATIENT
Start: 2025-07-08

## 2025-07-08 RX ORDER — ATORVASTATIN CALCIUM 80 MG/1
80 TABLET, FILM COATED ORAL DAILY
Qty: 90 TABLET | Refills: 3 | Status: CANCELLED | OUTPATIENT
Start: 2025-07-08

## 2025-07-08 RX ORDER — LANOLIN ALCOHOL/MO/W.PET/CERES
1 CREAM (GRAM) TOPICAL 2 TIMES DAILY
Qty: 180 TABLET | Refills: 0 | Status: SHIPPED | OUTPATIENT
Start: 2025-07-08

## 2025-07-08 RX ORDER — FLUOXETINE 20 MG/1
20 CAPSULE ORAL DAILY
Qty: 30 CAPSULE | Refills: 1 | Status: CANCELLED | OUTPATIENT
Start: 2025-07-08

## 2025-07-08 RX ORDER — CARVEDILOL 25 MG/1
25 TABLET ORAL 2 TIMES DAILY
Qty: 60 TABLET | Refills: 1 | Status: CANCELLED | OUTPATIENT
Start: 2025-07-08 | End: 2025-09-06

## 2025-07-08 ASSESSMENT — PATIENT HEALTH QUESTIONNAIRE - PHQ9
2. FEELING DOWN, DEPRESSED OR HOPELESS: NOT AT ALL
SUM OF ALL RESPONSES TO PHQ9 QUESTIONS 1 AND 2: 0
1. LITTLE INTEREST OR PLEASURE IN DOING THINGS: NOT AT ALL

## 2025-07-08 ASSESSMENT — ENCOUNTER SYMPTOMS
DEPRESSION: 0
OCCASIONAL FEELINGS OF UNSTEADINESS: 0
LOSS OF SENSATION IN FEET: 0

## 2025-07-08 NOTE — PROGRESS NOTES
Subjective   Patient ID: Viki Diez is a 60 y.o. female who presents for Follow-up (EPV, F/U Medication Refills/Needs to do BW).    History of Present Illness  Viki Diez is a 60 year old female who presents for blood work and medication refills.    She experiences difficulty waking up in the mornings, often sleeping until three o'clock and missing daily activities. She attributes this to her medication regimen, which she has adjusted over the past year. Initially, she was taking two trazodone pills at night to aid sleep but has since reduced to one pill. She experiences periods of insomnia, sometimes staying awake for three nights in a row.  She sees psych NP to manage her depression/anxiety and insomnia.    She was recently hospitalized from June 7 to Parul 10 due to a urinary tract infection and hyponatremia. Her sodium levels were low, no etiology found. She denies current urinary issues such as burning or frequency.    She has a history of heartburn for which she takes omeprazole. She reports severe heartburn that disrupts her sleep, stating 'the heartburn is so bad it makes me puke.' She has been on omeprazole since her first kidney transplant and notes significant discomfort when she misses a dose.    She requires magnesium supplementation, which she has not been receiving regularly. During her recent hospital stay, she received magnesium intravenously. Her current dosage is 400 mg twice a day.    She experienced a stroke on December 13, 2024, which affected her left side. She reports that her left side was 'real goofy' but has since improved without the need for physical therapy. Occasionally, her left hand still has minor issues, such as difficulty holding a coffee cup.    She has resumed smoking after a period of cessation, attributing the restart to stress.    She is overdue for a Pap smear and has been seeing Dr. Cisse for her gynecological care. She missed her last appointment due to the  "stroke.    Review of Systems  Genl: The patient has been in good health without recent weight change, fevers, or night sweats  CVS: No chest pain, irregular heartbeat, shortness of breath, or swollen ankles  Resp: No cough or difficulty breathing  GI: No change in bowel habits or abdominal pain. + heartburn wit  : No urinary problems.     Objective     /90 (BP Location: Left arm, Patient Position: Sitting)   Pulse 65   Temp 36.8 °C (98.2 °F) (Oral)   Resp 20   Ht 1.702 m (5' 7.01\")   Wt 53.6 kg (118 lb 3.2 oz)   SpO2 98%   BMI 18.51 kg/m²      Physical Exam  Alert, well-appearing.    CVS: RRR, no murmurs.     Respiratory: Clear and equal breath sounds.    GI: Soft, nontender, no masses or hepatosplenomegaly.     Assessment/Plan     Diagnoses and all orders for this visit:  Urinary tract infection without hematuria, site unspecified  -     POCT UA Automated manually resulted  Hyponatremia  Hypomagnesemia  -     magnesium oxide (Mag-Ox) 400 mg (241.3 mg elemental) tablet; Take 1 tablet by mouth 2 times a day.  Gastroesophageal reflux disease, unspecified whether esophagitis present  -     omeprazole (PriLOSEC) 20 mg DR capsule; Take 1 capsule (20 mg) by mouth once daily in the morning. Take before meals.  Hypertension, unspecified type  -     omeprazole (PriLOSEC) 20 mg DR capsule; Take 1 capsule (20 mg) by mouth once daily in the morning. Take before meals.  Left pontine stroke (Multi)  Other recurrent depressive disorders  Health maintenance examination  -     Follow Up In [facility] - PCP - Medicare Annual; Future    Assessment & Plan  Insomnia  Chronic insomnia.Under psychiatric care for depression, anxiety, and insomnia.     Gastroesophageal Reflux Disease (GERD)  Chronic heartburn, severe at night, causing nausea and sleep disturbances. Significant symptoms without omeprazole.  - Renew omeprazole prescription.    Hyponatremia  Recent hyponatremia with hospitalization.   Patient is to do " bloodwork this week.     Urinary Tract Infection (UTI)  Recent UTI hospitalization. No current symptoms, urine sample requested to confirm resolution.  - Obtain urine sample for analysis to confirm resolution of UTI.    Stroke  Right pontine stroke in December 2024.    Overdue for Pap smear. Under gynecologist care for abnormal Pap smears.    Follow-up  Needs blood work and transplant team follow-up for ongoing care and medication management.  - Complete blood work  - Follow up with transplant team for medication management and ongoing care.    Mary Alva MD    This medical note was created with the assistance of artificial intelligence (AI) for documentation purposes. The content has been reviewed and confirmed by the healthcare provider for accuracy and completeness. Patient consented to the use of audio recording and use of AI during their visit.

## 2025-08-01 ENCOUNTER — TELEPHONE (OUTPATIENT)
Facility: HOSPITAL | Age: 60
End: 2025-08-01
Payer: MEDICARE

## 2025-08-01 DIAGNOSIS — F33.8 OTHER RECURRENT DEPRESSIVE DISORDERS: ICD-10-CM

## 2025-08-01 DIAGNOSIS — Z94.0 KIDNEY REPLACED BY TRANSPLANT (HHS-HCC): ICD-10-CM

## 2025-08-01 DIAGNOSIS — G47.00 INSOMNIA, UNSPECIFIED TYPE: ICD-10-CM

## 2025-08-01 RX ORDER — FLUOXETINE HYDROCHLORIDE 40 MG/1
40 CAPSULE ORAL DAILY
Qty: 30 CAPSULE | Refills: 0 | OUTPATIENT
Start: 2025-08-01

## 2025-08-01 RX ORDER — LURASIDONE HYDROCHLORIDE 40 MG/1
TABLET, FILM COATED ORAL
Qty: 30 TABLET | Refills: 0 | OUTPATIENT
Start: 2025-08-01

## 2025-08-01 RX ORDER — TRAZODONE HYDROCHLORIDE 100 MG/1
TABLET ORAL
Qty: 60 TABLET | Refills: 0 | OUTPATIENT
Start: 2025-08-01

## 2025-08-01 RX ORDER — TACROLIMUS 1 MG/1
2 CAPSULE ORAL EVERY 12 HOURS
Qty: 120 CAPSULE | Refills: 11 | Status: SHIPPED | OUTPATIENT
Start: 2025-08-01 | End: 2026-08-01

## 2025-08-01 NOTE — TELEPHONE ENCOUNTER
Patient called to request refills of the below medication(s) and dose(s).  Please send prescription for the checked items below to       [id number] - [place], [place] - [address]  [address] [place] [address]  Phone: [phone] Fax: [phone]        tacrolimus (Prograf) 1 mg capsule

## 2025-08-01 NOTE — TELEPHONE ENCOUNTER
Pt not seen in almost 1 year- needs appointment, or can request PCP take over management of meds.

## 2025-08-04 ENCOUNTER — TELEPHONE (OUTPATIENT)
Facility: HOSPITAL | Age: 60
End: 2025-08-04
Payer: MEDICARE

## 2025-08-04 DIAGNOSIS — Z94.0 KIDNEY REPLACED BY TRANSPLANT (HHS-HCC): ICD-10-CM

## 2025-08-04 RX ORDER — MYCOPHENOLATE MOFETIL 250 MG/1
1000 CAPSULE ORAL 2 TIMES DAILY
Qty: 240 CAPSULE | Refills: 11 | Status: SHIPPED | OUTPATIENT
Start: 2025-08-04 | End: 2026-08-04

## 2025-08-04 NOTE — TELEPHONE ENCOUNTER
Patient called requesting to speak with coordinator about her Cellcept and MMF being canceled..  Patient call back number is [phone] .

## 2025-08-05 ENCOUNTER — TELEPHONE (OUTPATIENT)
Facility: HOSPITAL | Age: 60
End: 2025-08-05

## 2025-08-22 DIAGNOSIS — G47.00 INSOMNIA, UNSPECIFIED TYPE: ICD-10-CM

## 2025-08-22 DIAGNOSIS — F33.8 OTHER RECURRENT DEPRESSIVE DISORDERS: ICD-10-CM

## 2025-08-25 DIAGNOSIS — I10 HYPERTENSION, UNSPECIFIED TYPE: ICD-10-CM

## 2025-08-26 RX ORDER — TRAZODONE HYDROCHLORIDE 100 MG/1
TABLET ORAL
Qty: 60 TABLET | Refills: 0 | OUTPATIENT
Start: 2025-08-26

## 2025-08-26 RX ORDER — FLUOXETINE HYDROCHLORIDE 40 MG/1
40 CAPSULE ORAL DAILY
Qty: 30 CAPSULE | Refills: 0 | OUTPATIENT
Start: 2025-08-26

## 2025-08-26 RX ORDER — LURASIDONE HYDROCHLORIDE 40 MG/1
TABLET, FILM COATED ORAL
Qty: 30 TABLET | Refills: 0 | OUTPATIENT
Start: 2025-08-26

## 2025-08-27 RX ORDER — CARVEDILOL 25 MG/1
25 TABLET ORAL 2 TIMES DAILY
Qty: 60 TABLET | Refills: 1 | OUTPATIENT
Start: 2025-08-27 | End: 2025-10-26

## 2025-08-29 DIAGNOSIS — Z94.0 KIDNEY REPLACED BY TRANSPLANT (HHS-HCC): ICD-10-CM

## 2025-09-02 ENCOUNTER — TELEPHONE (OUTPATIENT)
Dept: TRANSPLANT | Facility: HOSPITAL | Age: 60
End: 2025-09-02
Payer: MEDICARE

## 2025-09-02 ENCOUNTER — TELEPHONE (OUTPATIENT)
Dept: BEHAVIORAL HEALTH | Facility: CLINIC | Age: 60
End: 2025-09-02
Payer: MEDICARE

## 2025-09-02 DIAGNOSIS — G47.00 INSOMNIA, UNSPECIFIED TYPE: ICD-10-CM

## 2025-09-02 DIAGNOSIS — F33.8 OTHER RECURRENT DEPRESSIVE DISORDERS: ICD-10-CM

## 2025-09-02 RX ORDER — LURASIDONE HYDROCHLORIDE 40 MG/1
40 TABLET, FILM COATED ORAL
Qty: 30 TABLET | Refills: 0 | Status: SHIPPED | OUTPATIENT
Start: 2025-09-02 | End: 2025-10-02

## 2025-09-02 RX ORDER — FLUOXETINE HYDROCHLORIDE 40 MG/1
40 CAPSULE ORAL DAILY
Qty: 30 CAPSULE | Refills: 0 | Status: SHIPPED | OUTPATIENT
Start: 2025-09-02

## 2025-09-02 RX ORDER — TRAZODONE HYDROCHLORIDE 100 MG/1
200 TABLET ORAL NIGHTLY
Qty: 60 TABLET | Refills: 0 | Status: SHIPPED | OUTPATIENT
Start: 2025-09-02 | End: 2025-10-02

## 2025-09-02 RX ORDER — FLUOXETINE 20 MG/1
20 CAPSULE ORAL DAILY
Qty: 30 CAPSULE | Refills: 0 | Status: SHIPPED | OUTPATIENT
Start: 2025-09-02 | End: 2025-10-02

## 2025-10-09 ENCOUNTER — APPOINTMENT (OUTPATIENT)
Dept: PRIMARY CARE | Facility: CLINIC | Age: 60
End: 2025-10-09
Payer: MEDICARE